# Patient Record
Sex: FEMALE | Race: WHITE | Employment: FULL TIME | URBAN - NONMETROPOLITAN AREA
[De-identification: names, ages, dates, MRNs, and addresses within clinical notes are randomized per-mention and may not be internally consistent; named-entity substitution may affect disease eponyms.]

---

## 2019-09-09 LAB — PAP SMEAR, EXTERNAL: NORMAL

## 2021-03-05 ENCOUNTER — OFFICE VISIT (OUTPATIENT)
Dept: FAMILY MEDICINE CLINIC | Age: 41
End: 2021-03-05
Payer: COMMERCIAL

## 2021-03-05 VITALS
HEART RATE: 80 BPM | BODY MASS INDEX: 34.26 KG/M2 | DIASTOLIC BLOOD PRESSURE: 62 MMHG | HEIGHT: 65 IN | WEIGHT: 205.6 LBS | SYSTOLIC BLOOD PRESSURE: 120 MMHG | OXYGEN SATURATION: 95 % | TEMPERATURE: 98.2 F

## 2021-03-05 DIAGNOSIS — M19.90 ARTHRITIS: ICD-10-CM

## 2021-03-05 DIAGNOSIS — E66.9 OBESITY, UNSPECIFIED CLASSIFICATION, UNSPECIFIED OBESITY TYPE, UNSPECIFIED WHETHER SERIOUS COMORBIDITY PRESENT: ICD-10-CM

## 2021-03-05 DIAGNOSIS — R51.9 CHRONIC NONINTRACTABLE HEADACHE, UNSPECIFIED HEADACHE TYPE: ICD-10-CM

## 2021-03-05 DIAGNOSIS — G89.29 CHRONIC NONINTRACTABLE HEADACHE, UNSPECIFIED HEADACHE TYPE: ICD-10-CM

## 2021-03-05 DIAGNOSIS — Z00.00 ROUTINE GENERAL MEDICAL EXAMINATION AT A HEALTH CARE FACILITY: ICD-10-CM

## 2021-03-05 DIAGNOSIS — F41.9 ANXIETY: Primary | ICD-10-CM

## 2021-03-05 PROBLEM — K64.9 UNSPECIFIED HEMORRHOIDS: Status: ACTIVE | Noted: 2020-10-19

## 2021-03-05 PROBLEM — R87.811 VAGINAL HIGH RISK HUMAN PAPILLOMAVIRUS (HPV) DNA TEST POSITIVE: Status: ACTIVE | Noted: 2020-10-19

## 2021-03-05 PROBLEM — Z72.0 TOBACCO USE: Status: ACTIVE | Noted: 2020-10-19

## 2021-03-05 PROBLEM — J30.2 OTHER SEASONAL ALLERGIC RHINITIS: Status: ACTIVE | Noted: 2020-10-19

## 2021-03-05 PROCEDURE — 99204 OFFICE O/P NEW MOD 45 MIN: CPT | Performed by: PHYSICIAN ASSISTANT

## 2021-03-05 RX ORDER — IBUPROFEN 800 MG/1
1 TABLET ORAL EVERY 6 HOURS PRN
COMMUNITY
Start: 2021-02-23 | End: 2021-03-05 | Stop reason: SDUPTHER

## 2021-03-05 RX ORDER — CITALOPRAM 40 MG/1
1 TABLET ORAL DAILY
COMMUNITY
Start: 2021-02-22 | End: 2021-03-05

## 2021-03-05 RX ORDER — BUSPIRONE HYDROCHLORIDE 10 MG/1
10 TABLET ORAL 3 TIMES DAILY
COMMUNITY
End: 2021-03-05

## 2021-03-05 RX ORDER — BUTALBITAL, ACETAMINOPHEN AND CAFFEINE 50; 325; 40 MG/1; MG/1; MG/1
1-2 TABLET ORAL EVERY 4 HOURS PRN
COMMUNITY
Start: 2021-02-09 | End: 2021-03-05 | Stop reason: SDUPTHER

## 2021-03-05 RX ORDER — IBUPROFEN 800 MG/1
800 TABLET ORAL EVERY 6 HOURS PRN
Qty: 120 TABLET | Refills: 2 | Status: SHIPPED | OUTPATIENT
Start: 2021-03-05 | End: 2021-08-23

## 2021-03-05 RX ORDER — AMITRIPTYLINE HYDROCHLORIDE 25 MG/1
25 TABLET, FILM COATED ORAL NIGHTLY
Qty: 90 TABLET | Refills: 1 | Status: SHIPPED | OUTPATIENT
Start: 2021-03-05 | End: 2021-06-22

## 2021-03-05 RX ORDER — BUTALBITAL, ACETAMINOPHEN AND CAFFEINE 50; 325; 40 MG/1; MG/1; MG/1
1-2 TABLET ORAL EVERY 4 HOURS PRN
Qty: 20 TABLET | Refills: 2 | Status: SHIPPED | OUTPATIENT
Start: 2021-03-05 | End: 2021-06-18 | Stop reason: SDUPTHER

## 2021-03-05 SDOH — HEALTH STABILITY: MENTAL HEALTH: HOW MANY STANDARD DRINKS CONTAINING ALCOHOL DO YOU HAVE ON A TYPICAL DAY?: NOT ASKED

## 2021-03-05 SDOH — HEALTH STABILITY: MENTAL HEALTH: HOW OFTEN DO YOU HAVE A DRINK CONTAINING ALCOHOL?: MONTHLY OR LESS

## 2021-03-05 ASSESSMENT — ENCOUNTER SYMPTOMS
NAUSEA: 0
VOMITING: 0
EYE REDNESS: 0
COLOR CHANGE: 0
CHEST TIGHTNESS: 0
EYE PAIN: 0
SHORTNESS OF BREATH: 0
SORE THROAT: 0
PHOTOPHOBIA: 0
WHEEZING: 0
DIARRHEA: 0
EYE DISCHARGE: 0
BLOOD IN STOOL: 0
CONSTIPATION: 0
ABDOMINAL PAIN: 0
BACK PAIN: 0
COUGH: 0
RHINORRHEA: 0

## 2021-03-05 ASSESSMENT — PATIENT HEALTH QUESTIONNAIRE - PHQ9
SUM OF ALL RESPONSES TO PHQ9 QUESTIONS 1 & 2: 0
SUM OF ALL RESPONSES TO PHQ QUESTIONS 1-9: 0
1. LITTLE INTEREST OR PLEASURE IN DOING THINGS: 0
SUM OF ALL RESPONSES TO PHQ QUESTIONS 1-9: 0

## 2021-03-05 NOTE — PROGRESS NOTES
Millicent Calix (:  1980) is a 41 y.o. female,New patient, here for evaluation of the following chief complaint(s):    New Patient (Establish primary care)  This is my first patient encounter with Millicent Calix; previous PCP was [facility] in [place]; chart review was completed. SUBJECTIVE/OBJECTIVE:  HPI     Millicent Calix is a pleasant 41 y.o. female presenting to clinic today to establish care and for medical management. Anxiety - patient reports chronic generalized anxiety; states that BuSpar and Celexa which were previously prescribed to her have not worked, and patient took herself off of these and has not taken them for the past month. Patient reports both those medications contributed to her chronic headaches. Patient denies any recent panic attacks, suicidal or homicidal ideations. Chronic headaches -patient reports she has taken Fioricet since she was 13years old, states that she recently was taking it almost daily due to concurrent BuSpar and Celexa. Patient reports that since discontinuing those medications she only has to take Fioricet approximately once per week. Left hip and left knee pain -patient reports over the past several years she has had increasing achy pain in her left hip and knee which is worsened with activity, patient reports that she walks approximately 20,000 steps per day at her job. Patient denies any acute traumatic injuries or events. patient is concerned for osteoarthritis. Patient denies any recent episodes of chest pain shortness of breath, change in bowel or bladder habits, vision changes, lightheadedness, syncope, edema, paresthesias.        Current Outpatient Medications   Medication Sig Dispense Refill    amitriptyline (ELAVIL) 25 MG tablet Take 1 tablet by mouth nightly 90 tablet 1    ibuprofen (ADVIL;MOTRIN) 800 MG tablet Take 1 tablet by mouth every 6 hours as needed for Pain (headaches) 120 tablet 2    butalbital-acetaminophen-caffeine (FIORICET, ESGIC) -40 MG per tablet Take 1-2 tablets by mouth every 4 hours as needed for Headaches 20 tablet 2     No current facility-administered medications for this visit. Review of Systems   Constitutional: Negative for appetite change, chills, fatigue and fever. HENT: Negative for congestion, ear pain, hearing loss, rhinorrhea and sore throat. Eyes: Negative for photophobia, pain, discharge and redness. Respiratory: Negative for cough, chest tightness, shortness of breath and wheezing. Cardiovascular: Negative for chest pain, palpitations and leg swelling. Gastrointestinal: Negative for abdominal pain, blood in stool, constipation, diarrhea, nausea and vomiting. Endocrine: Negative for polyuria. Genitourinary: Negative for difficulty urinating, dysuria, flank pain, frequency, hematuria and urgency. Musculoskeletal: Positive for arthralgias. Negative for back pain, gait problem and joint swelling. Skin: Negative for color change and rash. Neurological: Positive for headaches. Negative for dizziness, syncope, weakness and light-headedness. Hematological: Negative for adenopathy. Psychiatric/Behavioral: Negative for agitation, behavioral problems and suicidal ideas. The patient is nervous/anxious. Physical Exam  Vitals signs and nursing note reviewed. Constitutional:       General: She is not in acute distress. Appearance: She is obese. She is not ill-appearing. HENT:      Head: Normocephalic and atraumatic. Right Ear: External ear normal.      Left Ear: Tympanic membrane and external ear normal.      Ears:      Comments: Prior surgical intervention performed on right tympanic membrane and ear canal     Nose: No congestion or rhinorrhea. Mouth/Throat:      Mouth: Mucous membranes are moist.      Pharynx: No oropharyngeal exudate or posterior oropharyngeal erythema.    Eyes:      Extraocular Movements: Extraocular movements intact. Pupils: Pupils are equal, round, and reactive to light. Neck:      Musculoskeletal: Normal range of motion. No neck rigidity. Vascular: No carotid bruit. Cardiovascular:      Rate and Rhythm: Normal rate. Pulses: Normal pulses. Pulmonary:      Effort: Pulmonary effort is normal.   Abdominal:      Palpations: Abdomen is soft. Musculoskeletal: Normal range of motion. Skin:     General: Skin is warm and dry. Capillary Refill: Capillary refill takes less than 2 seconds. Neurological:      Mental Status: She is alert and oriented to person, place, and time. Mental status is at baseline. Psychiatric:         Mood and Affect: Mood normal.         ASSESSMENT/PLAN:  1. Anxiety   -Patient took herself off of Celexa and BuSpar 1 month ago due to side effects; no need to wean off at this point. Headaches have improved since discontinuing  medication, however patient reports that anxiety levels are high and she would like a new medication. Patient is not interested in psychiatric evaluation or cognitive behavioral therapy at this time. -     amitriptyline (ELAVIL) 25 MG tablet; Take 1 tablet by mouth nightly, Disp-90 tablet, R-1Normal   -Counseled patient on uptitrating slowly weekly and potential side effects  2. Chronic nonintractable headache, unspecified headache type   -Reports longstanding use of Fioricet, currently using approximately once per week with benefit. -     butalbital-acetaminophen-caffeine (FIORICET, ESGIC) -40 MG per tablet; Take 1-2 tablets by mouth every 4 hours as needed for Headaches, Disp-20 tablet, R-2Normal  3. Arthritis   -Counseled patient that he is in a small amount of weight loss may significantly reduce her arthritic pain. -     ibuprofen (ADVIL;MOTRIN) 800 MG tablet; Take 1 tablet by mouth every 6 hours as needed for Pain (headaches), Disp-120 tablet, R-2Normal  -     XR HIP LEFT (2-3 VIEWS)  -     XR KNEE LEFT (3 VIEWS)  4. Obesity, unspecified classification, unspecified obesity type, unspecified whether serious comorbidity present  The patient is asked to make an attempt to improve diet and exercise patterns to aid in medical management of this problem. -     Lipid, Fasting  -     CBC WITH AUTO DIFFERENTIAL  -     COMPREHENSIVE METABOLIC PANEL  -     TSH WITH REFLEX TO FT4  -     VITAMIN D 25 HYDROXY  5. Routine general medical examination at a health care facility  -     Lipid, Fasting  -     CBC WITH AUTO DIFFERENTIAL  -     COMPREHENSIVE METABOLIC PANEL  -     TSH WITH REFLEX TO FT4  -     VITAMIN D 25 HYDROXY      Return in about 6 weeks (around 4/16/2021), or if symptoms worsen or fail to improve, for Follow Up. An electronic signature was used to authenticate this note.     --Rodgers Meigs, PA

## 2021-05-07 DIAGNOSIS — Z00.00 ROUTINE GENERAL MEDICAL EXAMINATION AT A HEALTH CARE FACILITY: ICD-10-CM

## 2021-05-07 DIAGNOSIS — Z00.00 ROUTINE GENERAL MEDICAL EXAMINATION AT A HEALTH CARE FACILITY: Primary | ICD-10-CM

## 2021-05-07 LAB
A/G RATIO: 2 (ref 1.1–2.2)
ALBUMIN SERPL-MCNC: 4.1 G/DL (ref 3.4–5)
ALP BLD-CCNC: 75 U/L (ref 40–129)
ALT SERPL-CCNC: 12 U/L (ref 10–40)
ANION GAP SERPL CALCULATED.3IONS-SCNC: 11 MMOL/L (ref 3–16)
AST SERPL-CCNC: 10 U/L (ref 15–37)
BASOPHILS ABSOLUTE: 0.1 K/UL (ref 0–0.2)
BASOPHILS RELATIVE PERCENT: 0.6 %
BILIRUB SERPL-MCNC: <0.2 MG/DL (ref 0–1)
BUN BLDV-MCNC: 16 MG/DL (ref 7–20)
CALCIUM SERPL-MCNC: 8.7 MG/DL (ref 8.3–10.6)
CHLORIDE BLD-SCNC: 105 MMOL/L (ref 99–110)
CHOLESTEROL, FASTING: 146 MG/DL (ref 0–199)
CO2: 23 MMOL/L (ref 21–32)
CREAT SERPL-MCNC: 0.5 MG/DL (ref 0.6–1.1)
EOSINOPHILS ABSOLUTE: 0.5 K/UL (ref 0–0.6)
EOSINOPHILS RELATIVE PERCENT: 4.5 %
GFR AFRICAN AMERICAN: >60
GFR NON-AFRICAN AMERICAN: >60
GLOBULIN: 2.1 G/DL
GLUCOSE BLD-MCNC: 95 MG/DL (ref 70–99)
HCT VFR BLD CALC: 40.8 % (ref 36–48)
HDLC SERPL-MCNC: 31 MG/DL (ref 40–60)
HEMOGLOBIN: 14.1 G/DL (ref 12–16)
LDL CHOLESTEROL CALCULATED: 87 MG/DL
LYMPHOCYTES ABSOLUTE: 3 K/UL (ref 1–5.1)
LYMPHOCYTES RELATIVE PERCENT: 24.2 %
MCH RBC QN AUTO: 30.7 PG (ref 26–34)
MCHC RBC AUTO-ENTMCNC: 34.5 G/DL (ref 31–36)
MCV RBC AUTO: 89.1 FL (ref 80–100)
MONOCYTES ABSOLUTE: 0.5 K/UL (ref 0–1.3)
MONOCYTES RELATIVE PERCENT: 4.4 %
NEUTROPHILS ABSOLUTE: 8.1 K/UL (ref 1.7–7.7)
NEUTROPHILS RELATIVE PERCENT: 66.3 %
PDW BLD-RTO: 13.7 % (ref 12.4–15.4)
PLATELET # BLD: 296 K/UL (ref 135–450)
PMV BLD AUTO: 9.7 FL (ref 5–10.5)
POTASSIUM SERPL-SCNC: 4.5 MMOL/L (ref 3.5–5.1)
RBC # BLD: 4.58 M/UL (ref 4–5.2)
SODIUM BLD-SCNC: 139 MMOL/L (ref 136–145)
TOTAL PROTEIN: 6.2 G/DL (ref 6.4–8.2)
TRIGLYCERIDE, FASTING: 141 MG/DL (ref 0–150)
TSH REFLEX: 1.55 UIU/ML (ref 0.27–4.2)
VITAMIN D 25-HYDROXY: 16.9 NG/ML
VLDLC SERPL CALC-MCNC: 28 MG/DL
WBC # BLD: 12.3 K/UL (ref 4–11)

## 2021-06-17 DIAGNOSIS — R51.9 CHRONIC NONINTRACTABLE HEADACHE, UNSPECIFIED HEADACHE TYPE: ICD-10-CM

## 2021-06-17 DIAGNOSIS — G89.29 CHRONIC NONINTRACTABLE HEADACHE, UNSPECIFIED HEADACHE TYPE: ICD-10-CM

## 2021-06-18 RX ORDER — BUTALBITAL, ACETAMINOPHEN AND CAFFEINE 50; 325; 40 MG/1; MG/1; MG/1
1-2 TABLET ORAL EVERY 4 HOURS PRN
Qty: 20 TABLET | Refills: 2 | Status: SHIPPED | OUTPATIENT
Start: 2021-06-18 | End: 2021-08-13 | Stop reason: SDUPTHER

## 2021-06-22 ENCOUNTER — OFFICE VISIT (OUTPATIENT)
Dept: FAMILY MEDICINE CLINIC | Age: 41
End: 2021-06-22
Payer: COMMERCIAL

## 2021-06-22 VITALS
HEART RATE: 70 BPM | BODY MASS INDEX: 33.71 KG/M2 | RESPIRATION RATE: 16 BRPM | OXYGEN SATURATION: 96 % | TEMPERATURE: 97.6 F | WEIGHT: 202.6 LBS | SYSTOLIC BLOOD PRESSURE: 130 MMHG | DIASTOLIC BLOOD PRESSURE: 80 MMHG

## 2021-06-22 DIAGNOSIS — G89.29 CHRONIC NONINTRACTABLE HEADACHE, UNSPECIFIED HEADACHE TYPE: Primary | ICD-10-CM

## 2021-06-22 DIAGNOSIS — F41.9 ANXIETY: ICD-10-CM

## 2021-06-22 DIAGNOSIS — R51.9 CHRONIC NONINTRACTABLE HEADACHE, UNSPECIFIED HEADACHE TYPE: Primary | ICD-10-CM

## 2021-06-22 PROCEDURE — 99213 OFFICE O/P EST LOW 20 MIN: CPT | Performed by: PHYSICIAN ASSISTANT

## 2021-06-22 PROCEDURE — G8427 DOCREV CUR MEDS BY ELIG CLIN: HCPCS | Performed by: PHYSICIAN ASSISTANT

## 2021-06-22 PROCEDURE — G8417 CALC BMI ABV UP PARAM F/U: HCPCS | Performed by: PHYSICIAN ASSISTANT

## 2021-06-22 PROCEDURE — 4004F PT TOBACCO SCREEN RCVD TLK: CPT | Performed by: PHYSICIAN ASSISTANT

## 2021-06-22 RX ORDER — VENLAFAXINE HYDROCHLORIDE 37.5 MG/1
37.5 CAPSULE, EXTENDED RELEASE ORAL DAILY
Qty: 60 CAPSULE | Refills: 0 | Status: SHIPPED | OUTPATIENT
Start: 2021-06-22 | End: 2021-07-07

## 2021-06-22 RX ORDER — RIZATRIPTAN BENZOATE 5 MG/1
5 TABLET ORAL
Qty: 9 TABLET | Refills: 3 | Status: SHIPPED | OUTPATIENT
Start: 2021-06-22 | End: 2021-07-13 | Stop reason: SDUPTHER

## 2021-06-22 ASSESSMENT — ENCOUNTER SYMPTOMS
DIARRHEA: 0
EYE REDNESS: 0
SHORTNESS OF BREATH: 0
SORE THROAT: 0
PHOTOPHOBIA: 0
CONSTIPATION: 0
CHEST TIGHTNESS: 0
COUGH: 0
VOMITING: 0
RHINORRHEA: 0
ABDOMINAL PAIN: 0
EYE PAIN: 0
BACK PAIN: 0
WHEEZING: 0
COLOR CHANGE: 0
NAUSEA: 0
EYE DISCHARGE: 0
BLOOD IN STOOL: 0

## 2021-06-22 ASSESSMENT — PATIENT HEALTH QUESTIONNAIRE - PHQ9
SUM OF ALL RESPONSES TO PHQ QUESTIONS 1-9: 0
SUM OF ALL RESPONSES TO PHQ9 QUESTIONS 1 & 2: 0
2. FEELING DOWN, DEPRESSED OR HOPELESS: 0
SUM OF ALL RESPONSES TO PHQ QUESTIONS 1-9: 0
SUM OF ALL RESPONSES TO PHQ QUESTIONS 1-9: 0
1. LITTLE INTEREST OR PLEASURE IN DOING THINGS: 0

## 2021-06-22 NOTE — PROGRESS NOTES
Roxana Chapa (:  1980) is a 35 y.o. female,Established patient, here for evaluation of the following chief complaint(s):    Headache (front and back of head needs work note constant pain)      SUBJECTIVE/OBJECTIVE:  HPI  Roxana Chapa is a pleasant 35 y.o. female presenting to clinic today for complaint of headache. Headache -patient does have significant history of chronic headaches; patient last seen approximately 3 months ago and patient attributed to her headaches to Celexa and BuSpar medications for which she discontinued and had relief of her headaches. Patient reports that approximately 1 month ago she began to take these medications again because she states that she \"did not feel like myself. \" Patient reports that she began to experience somewhat increase in headaches after initiating these medications but did feel better from a anxiety standpoint. Patient reports that she abruptly discontinued the Celexa a few days ago and had a significant headache onset yesterday. Patient reports that she took some ibuprofen without relief of headache. Patient reports ongoing headache located in occipital and frontal region; patient describes pain as sharp stabbing etc. Patient denies any neurologic symptoms. Patient states she felt nauseous yesterday but denies any vomiting. Patient reports she previously had taken Fioricet which typically had worked for headaches in the past but did not work for this headache today. Patient does report that she drinks approximately 4 Monster energy drinks per day and states that she has tried to cut back in the past few days as well. Anxiety -as noted above patient abruptly discontinued her Celexa which she self reinitiated approximately 1 month ago. Patient does report a constant worry/anxiety/stress which she is requesting medication for. Patient denies any suicidal homicidal ideations.  Patient is requesting medication other than Celexa, BuSpar, amitriptyline which she reports did not help with her anxiety. Patient reports that she was previously on a different medication several years ago for anxiety but is unsure what that medication was. Patient states she has never been on Effexor. Current Outpatient Medications   Medication Sig Dispense Refill    rizatriptan (MAXALT) 5 MG tablet Take 1 tablet by mouth once as needed for Migraine May repeat in 2 hours if needed 9 tablet 3    venlafaxine (EFFEXOR XR) 37.5 MG extended release capsule Take 1 capsule by mouth daily 60 capsule 0    butalbital-acetaminophen-caffeine (FIORICET, ESGIC) -40 MG per tablet Take 1-2 tablets by mouth every 4 hours as needed for Headaches 20 tablet 2    ibuprofen (ADVIL;MOTRIN) 800 MG tablet Take 1 tablet by mouth every 6 hours as needed for Pain (headaches) 120 tablet 2     No current facility-administered medications for this visit. Review of Systems   Constitutional: Negative for appetite change, chills, fatigue and fever. HENT: Negative for congestion, ear pain, hearing loss, rhinorrhea and sore throat. Eyes: Negative for photophobia, pain, discharge and redness. Respiratory: Negative for cough, chest tightness, shortness of breath and wheezing. Cardiovascular: Negative for chest pain, palpitations and leg swelling. Gastrointestinal: Negative for abdominal pain, blood in stool, constipation, diarrhea, nausea and vomiting. Endocrine: Negative for polyuria. Genitourinary: Negative for difficulty urinating, dysuria, flank pain, frequency, hematuria and urgency. Musculoskeletal: Negative for back pain, gait problem and joint swelling. Skin: Negative for color change and rash. Neurological: Positive for headaches. Negative for dizziness, syncope, weakness and light-headedness. Hematological: Negative for adenopathy. Psychiatric/Behavioral: Negative for agitation, behavioral problems and suicidal ideas. The patient is nervous/anxious.         Physical Exam  Vitals and nursing note reviewed. Constitutional:       General: She is not in acute distress. Appearance: She is obese. She is not ill-appearing. HENT:      Head: Normocephalic and atraumatic. Right Ear: External ear normal.      Left Ear: Tympanic membrane and external ear normal.      Ears:      Comments: Prior surgical intervention performed on right tympanic membrane and ear canal     Nose: No congestion or rhinorrhea. Mouth/Throat:      Mouth: Mucous membranes are moist.      Pharynx: No oropharyngeal exudate or posterior oropharyngeal erythema. Eyes:      Extraocular Movements: Extraocular movements intact. Pupils: Pupils are equal, round, and reactive to light. Neck:      Vascular: No carotid bruit. Cardiovascular:      Rate and Rhythm: Normal rate. Pulses: Normal pulses. Pulmonary:      Effort: Pulmonary effort is normal.   Abdominal:      Palpations: Abdomen is soft. Musculoskeletal:         General: Normal range of motion. Cervical back: Normal range of motion. No rigidity. Skin:     General: Skin is warm and dry. Capillary Refill: Capillary refill takes less than 2 seconds. Neurological:      General: No focal deficit present. Mental Status: She is alert and oriented to person, place, and time. Mental status is at baseline. Cranial Nerves: No cranial nerve deficit. Sensory: No sensory deficit. Motor: No weakness. Coordination: Coordination normal.      Gait: Gait normal.      Deep Tendon Reflexes: Reflexes normal.      Comments: Cranial nerve exam normal, Romberg testing normal. Negative [place]. Psychiatric:         Mood and Affect: Mood normal.         ASSESSMENT/PLAN:  1.  Chronic nonintractable headache, unspecified headache type   -Patient's current headache is likely multifactorial and related to abrupt discontinuation of citalopram which was previously discontinued by provider per patient request. Zahra Arteaga patient in depth on never discontinuing these medications abruptly due to withdrawal/adverse events. Furthermore, patient's headache may be related to caffeine withdrawal as she does report significant caffeine consumption daily. Patient reports she previously was given a triptan but did not take it because it was an injectable. Patient reports Fioricet did not work earlier for this current headache. Will send in Maxalt for patient's current headache; advised patient that this medication may not provide relief based on pathophysiology of patient's current headache.   -No concerning red flag features of patient's current headache at this time. Advised patient to report to emergency department if headache changes or worsen significantly or if patient begins to experience any neurologic symptoms. -     rizatriptan (MAXALT) 5 MG tablet; Take 1 tablet by mouth once as needed for Migraine May repeat in 2 hours if needed, Disp-9 tablet, R-3Normal  2. Anxiety   -Patient's chronic worry/anxiety is affecting her ADLs etc. Will trial Effexor for 1 month and follow-up for dose adjustments etc. Advised patient on side effects, monitoring, dosage.  -     venlafaxine (EFFEXOR XR) 37.5 MG extended release capsule; Take 1 capsule by mouth daily, Disp-60 capsule, R-0Normal      Return in about 4 weeks (around 7/20/2021), or if symptoms worsen or fail to improve, for Follow Up. An electronic signature was used to authenticate this note.     --[place] Lindsay

## 2021-07-09 ENCOUNTER — APPOINTMENT (OUTPATIENT)
Dept: GENERAL RADIOLOGY | Age: 41
End: 2021-07-09
Payer: COMMERCIAL

## 2021-07-09 ENCOUNTER — HOSPITAL ENCOUNTER (EMERGENCY)
Age: 41
Discharge: HOME | End: 2021-07-09
Attending: EMERGENCY MEDICINE
Payer: COMMERCIAL

## 2021-07-09 VITALS
WEIGHT: 190 LBS | DIASTOLIC BLOOD PRESSURE: 58 MMHG | TEMPERATURE: 97.2 F | SYSTOLIC BLOOD PRESSURE: 115 MMHG | RESPIRATION RATE: 18 BRPM | BODY MASS INDEX: 31.65 KG/M2 | HEIGHT: 65 IN | OXYGEN SATURATION: 98 % | HEART RATE: 73 BPM

## 2021-07-09 DIAGNOSIS — S39.012A STRAIN OF LUMBAR REGION, INITIAL ENCOUNTER: Primary | ICD-10-CM

## 2021-07-09 PROCEDURE — 99283 EMERGENCY DEPT VISIT LOW MDM: CPT

## 2021-07-09 PROCEDURE — 72100 X-RAY EXAM L-S SPINE 2/3 VWS: CPT

## 2021-07-09 PROCEDURE — 96372 THER/PROPH/DIAG INJ SC/IM: CPT

## 2021-07-09 PROCEDURE — 6370000000 HC RX 637 (ALT 250 FOR IP): Performed by: EMERGENCY MEDICINE

## 2021-07-09 PROCEDURE — 6360000002 HC RX W HCPCS: Performed by: EMERGENCY MEDICINE

## 2021-07-09 RX ORDER — KETOROLAC TROMETHAMINE 30 MG/ML
30 INJECTION, SOLUTION INTRAMUSCULAR; INTRAVENOUS ONCE
Status: COMPLETED | OUTPATIENT
Start: 2021-07-09 | End: 2021-07-09

## 2021-07-09 RX ORDER — LIDOCAINE 4 G/G
1 PATCH TOPICAL DAILY
Status: DISCONTINUED | OUTPATIENT
Start: 2021-07-09 | End: 2021-07-09 | Stop reason: HOSPADM

## 2021-07-09 RX ORDER — METHYLPREDNISOLONE 4 MG/1
TABLET ORAL
Qty: 1 KIT | Refills: 0 | Status: SHIPPED | OUTPATIENT
Start: 2021-07-09 | End: 2021-10-20 | Stop reason: DRUGHIGH

## 2021-07-09 RX ORDER — DIAZEPAM 5 MG/1
5 TABLET ORAL ONCE
Status: COMPLETED | OUTPATIENT
Start: 2021-07-09 | End: 2021-07-09

## 2021-07-09 RX ORDER — HYDROCODONE BITARTRATE AND ACETAMINOPHEN 5; 325 MG/1; MG/1
1 TABLET ORAL ONCE
Status: COMPLETED | OUTPATIENT
Start: 2021-07-09 | End: 2021-07-09

## 2021-07-09 RX ORDER — CYCLOBENZAPRINE HCL 10 MG
10 TABLET ORAL 3 TIMES DAILY PRN
Qty: 30 TABLET | Refills: 0 | Status: SHIPPED | OUTPATIENT
Start: 2021-07-09 | End: 2021-07-19

## 2021-07-09 RX ORDER — CITALOPRAM 40 MG/1
40 TABLET ORAL DAILY
COMMUNITY
Start: 2021-05-27 | End: 2021-07-09

## 2021-07-09 RX ORDER — LIDOCAINE 50 MG/G
1 PATCH TOPICAL DAILY
Qty: 30 PATCH | Refills: 0 | Status: SHIPPED | OUTPATIENT
Start: 2021-07-09 | End: 2021-08-08

## 2021-07-09 RX ORDER — ORPHENADRINE CITRATE 30 MG/ML
60 INJECTION INTRAMUSCULAR; INTRAVENOUS ONCE
Status: COMPLETED | OUTPATIENT
Start: 2021-07-09 | End: 2021-07-09

## 2021-07-09 RX ADMIN — HYDROCODONE BITARTRATE AND ACETAMINOPHEN 1 TABLET: 5; 325 TABLET ORAL at 16:39

## 2021-07-09 RX ADMIN — DIAZEPAM 5 MG: 5 TABLET ORAL at 17:25

## 2021-07-09 RX ADMIN — KETOROLAC TROMETHAMINE 30 MG: 30 INJECTION, SOLUTION INTRAMUSCULAR; INTRAVENOUS at 16:39

## 2021-07-09 RX ADMIN — ORPHENADRINE CITRATE 60 MG: 30 INJECTION INTRAMUSCULAR; INTRAVENOUS at 16:39

## 2021-07-09 ASSESSMENT — PAIN DESCRIPTION - DESCRIPTORS: DESCRIPTORS: SHARP

## 2021-07-09 ASSESSMENT — PAIN DESCRIPTION - FREQUENCY: FREQUENCY: CONTINUOUS

## 2021-07-09 ASSESSMENT — PAIN DESCRIPTION - ORIENTATION: ORIENTATION: RIGHT;LOWER

## 2021-07-09 ASSESSMENT — PAIN DESCRIPTION - LOCATION: LOCATION: BACK

## 2021-07-09 ASSESSMENT — PAIN DESCRIPTION - PAIN TYPE: TYPE: ACUTE PAIN

## 2021-07-09 ASSESSMENT — PAIN SCALES - GENERAL: PAINLEVEL_OUTOF10: 10

## 2021-07-09 NOTE — ED PROVIDER NOTES
Emergency [address] [facility] EMERGENCY DEPARTMENT    Patient: Nubia Alcala  MRN: [id number]  : 1980  Date of Evaluation: 2021  ED Provider: Duncan Richardson DO    Chief Complaint       Chief Complaint   Patient presents with    Back Pain     Bending down at work to  a pan and when she tried to stand back up had a sharp pain in lower right side of back, no hx of back issues     Key Tan is a 35 y.o. female who presents to the emergency department with complaints of injury to her right low back. The patient states she was at work and she bent over to  a pan that was on the floor. When she straightened back up she felt lancinating pain shoot up her back from her right sacroiliac region. The patient states this occurred approximately 1:10 PM at work. She presents to the emergency department complaining of 10 out of 10 pain \"I cannot walk. \"  And states \"it hurts when I lay on it\". Patient has had no previous back injuries. She suffered no trauma during this event. She has not taken any medication after this occurred. She came right to the emergency department from work. Numbness weakness tingling is absent. Loss of bowel or bladder control is absent. Saddle anesthesia absent. Fevers absent. IV Drug Use absent. ROS:     At least 6 systems reviewed and otherwise acutely negative except as in the Beto Glatter. Past History   History reviewed. No pertinent past medical history. History reviewed. No pertinent surgical history. Social History     Socioeconomic History    Marital status:      Spouse name: None    Number of children: None    Years of education: None    Highest education level: None   Occupational History    None   Tobacco Use    Smoking status: Current Every Day Smoker     Packs/day: 0.50     Years: 24.00     Pack years: 12.00    Smokeless tobacco: Never Used   Substance and Sexual Activity    Alcohol use:  Yes    Drug use: Never    Sexual activity: None   Other Topics Concern    None   Social History Narrative    None     Social Determinants of Health     Financial Resource Strain:     Difficulty of Paying Living Expenses:    Food Insecurity:     Worried About Running Out of Food in the Last Year:     [address] in the Last Year:    Transportation Needs:     Lack of Transportation (Medical):  Lack of Transportation (Non-Medical):    Physical Activity:     Days of Exercise per Week:     Minutes of Exercise per Session:    Stress:     Feeling of Stress :    Social Connections:     Frequency of Communication with Friends and Family:     Frequency of Social Gatherings with Friends and Family:     Attends [employer]ab[employer] Services:     Active Member of Clubs or Organizations:     Attends Club or Organization Meetings:     Marital Status:    Intimate Partner Violence:     Fear of Current or Ex-Partner:     Emotionally Abused:     Physically Abused:     Sexually Abused:        Medications/Allergies     Previous Medications    BUTALBITAL-ACETAMINOPHEN-CAFFEINE (FIORICET, ESGIC) -40 MG PER TABLET    Take 1-2 tablets by mouth every 4 hours as needed for Headaches    IBUPROFEN (ADVIL;MOTRIN) 800 MG TABLET    Take 1 tablet by mouth every 6 hours as needed for Pain (headaches)    RIZATRIPTAN (MAXALT) 5 MG TABLET    Take 1 tablet by mouth once as needed for Migraine May repeat in 2 hours if needed    VENLAFAXINE (EFFEXOR XR) 37.5 MG EXTENDED RELEASE CAPSULE    TAKE 1 CAPSULE BY MOUTH EVERY DAY     No Known Allergies     Physical Exam       ED Triage Vitals [07/09/21 1401]   BP Temp Temp Source Pulse Resp SpO2 Height Weight   (!) 115/58 97.2 °F (36.2 °C) Oral 73 18 98 % 5' 5\" (1.651 m) 190 lb (86.2 kg)     GENERAL APPEARANCE: Awake and alert. Cooperative. Nontoxic in appearance. The patient is laying on the bed with her legs extended. She is laying supine on her back. HEAD: Normocephalic.   EYES: Sclera anicteric. ENT: Tolerates saliva. NECK: Supple. LUNGS: Respirations unlabored. BACK: There is not thoracic or lumbar midline tenderness to palpation or step-offs. Paraspinal tenderness to palpation is  present in the right sacroiliac joint region. No overlying rashes. LE strength is 5/5. LE light touch is intact. LE DTR's are 2+ in the patellas and achilles. Straight leg test is positive on the RIGHT, negative on the LEFT. SKIN: Warm and dry. Diagnostics   Labs:  No results found for this visit on 07/09/21. Radiographs:  XR LUMBAR SPINE (2-3 VIEWS)    Result Date: 7/9/2021  EXAMINATION: THREE X-RAY VIEWS OF THE LUMBAR SPINE 7/9/2021 5:03 pm COMPARISON: None. HISTORY: ORDERING SYSTEM PROVIDED HISTORY: Low back pain, injured at work TECHNOLOGIST PROVIDED HISTORY: Reason for exam:->Low back pain, injured at work Reason for Exam: Low back pain, injured at work Acuity: Acute Type of Exam: Initial Mechanism of Injury: Low back pain, injured at work FINDINGS: The 1st non rib-bearing vertebral body is considered L1. Alignment is anatomic without spondylolisthesis. Vertebral body heights and disc spaces are maintained. No acute fracture or dislocation. Soft tissues are unremarkable. The sacroiliac joints are maintained. Tubal ligation clips are noted in the pelvis. No acute osseous abnormality or spondylolisthesis. ED Course and MDM   In brief, Alicia Nicole is a 35 y.o. female who presented to the emergency department with complaints of low back pain after lifting a device at work. The patient is complaining of pain in her right sacroiliac region radiating upward into her right flank. Lumbar x-ray here in the emergency department shows no acute findings. She did receive Toradol Norflex and 1 Norco with a Lidoderm patch. She states her pain is not improved although she is ambulatory through the ER. At this time I think it is safe for her to be discharged home.   She will be instructed to follow-up with occupational health before she returns to work. She is instructed to follow-up with them on Monday. She will be discharged stable condition at this time. ED Medication Orders (From admission, onward)    Start Ordered     Status Ordering Provider    07/09/21 1715 07/09/21 1712  diazePAM (VALIUM) tablet 5 mg  ONCE      Last MAR action: Given - by Kassy Beckham on 07/09/21 at [address], [facility]    07/09/21 1630 07/09/21 1616  ketorolac (TORADOL) injection 30 mg  ONCE      Last MAR action: Given - by Kassy Beckham on 07/09/21 at [address], [facility]    07/09/21 1630 07/09/21 1616  orphenadrine (NORFLEX) injection 60 mg  ONCE      Last MAR action: Given - by Kassy Beckham on 07/09/21 at [address], [facility]    07/09/21 1630 07/09/21 1616  HYDROcodone-acetaminophen (NORCO) 5-325 MG per tablet 1 tablet  ONCE      Last MAR action: Given - by Kassy Beckham on 07/09/21 at [address], [facility]    07/09/21 1630 07/09/21 1616  lidocaine 4 % external patch 1 patch  DAILY      Last MAR action: Patch Applied - by Kassy Beckham on 07/09/21 at [address] [place] [address], [facility]          I estimate there is LOW risk for (including but not limited to) RAPIDLY EXPANDING OR RUPTURED AAA, AORTIC DISSECTION, CAUDA EQUINA SYNDROME, or EPIDURAL MASS LESION thus I consider the discharge disposition reasonable. Joliebetsy Julian (or their surrogate) and I have discussed the diagnosis and risks, and we agree with discharging home with close follow-up. We also discussed returning to the Emergency Department immediately if new or worsening symptoms occur. We have discussed the symptoms which are most concerning that necessitate immediate return. Final Impression      1.  Strain of lumbar region, initial encounter        DISPOSITION Decision To Discharge 07/09/2021 05:30:55 PM       (Please note that portions of this note may have been completed with a voice recognition program. Efforts were made to edit the dictations but occasionally words are mis-transcribed.)    Abraham Péerz, DO   Tabby Solis, DO  07/09/21 2839

## 2021-07-13 ENCOUNTER — OFFICE VISIT (OUTPATIENT)
Dept: FAMILY MEDICINE CLINIC | Age: 41
End: 2021-07-13
Payer: COMMERCIAL

## 2021-07-13 VITALS
OXYGEN SATURATION: 98 % | RESPIRATION RATE: 16 BRPM | BODY MASS INDEX: 33.88 KG/M2 | TEMPERATURE: 98.6 F | SYSTOLIC BLOOD PRESSURE: 124 MMHG | HEART RATE: 110 BPM | DIASTOLIC BLOOD PRESSURE: 88 MMHG | WEIGHT: 203.6 LBS

## 2021-07-13 DIAGNOSIS — F41.9 ANXIETY: ICD-10-CM

## 2021-07-13 DIAGNOSIS — G89.29 CHRONIC NONINTRACTABLE HEADACHE, UNSPECIFIED HEADACHE TYPE: ICD-10-CM

## 2021-07-13 DIAGNOSIS — M54.41 ACUTE RIGHT-SIDED LOW BACK PAIN WITH RIGHT-SIDED SCIATICA: Primary | ICD-10-CM

## 2021-07-13 DIAGNOSIS — R51.9 CHRONIC NONINTRACTABLE HEADACHE, UNSPECIFIED HEADACHE TYPE: ICD-10-CM

## 2021-07-13 PROCEDURE — 99214 OFFICE O/P EST MOD 30 MIN: CPT | Performed by: PHYSICIAN ASSISTANT

## 2021-07-13 PROCEDURE — G8417 CALC BMI ABV UP PARAM F/U: HCPCS | Performed by: PHYSICIAN ASSISTANT

## 2021-07-13 PROCEDURE — 4004F PT TOBACCO SCREEN RCVD TLK: CPT | Performed by: PHYSICIAN ASSISTANT

## 2021-07-13 PROCEDURE — G8427 DOCREV CUR MEDS BY ELIG CLIN: HCPCS | Performed by: PHYSICIAN ASSISTANT

## 2021-07-13 RX ORDER — RIZATRIPTAN BENZOATE 5 MG/1
5 TABLET ORAL
Qty: 9 TABLET | Refills: 3 | Status: SHIPPED | OUTPATIENT
Start: 2021-07-13 | End: 2021-08-17 | Stop reason: SDUPTHER

## 2021-07-13 RX ORDER — TIZANIDINE 4 MG/1
4 TABLET ORAL 3 TIMES DAILY
Qty: 30 TABLET | Refills: 1 | Status: SHIPPED | OUTPATIENT
Start: 2021-07-13 | End: 2021-08-17 | Stop reason: SDUPTHER

## 2021-07-13 RX ORDER — VENLAFAXINE HYDROCHLORIDE 75 MG/1
75 CAPSULE, EXTENDED RELEASE ORAL DAILY
Qty: 60 CAPSULE | Refills: 1 | Status: SHIPPED | OUTPATIENT
Start: 2021-07-13 | End: 2021-09-07

## 2021-07-13 ASSESSMENT — PATIENT HEALTH QUESTIONNAIRE - PHQ9
SUM OF ALL RESPONSES TO PHQ9 QUESTIONS 1 & 2: 6
9. THOUGHTS THAT YOU WOULD BE BETTER OFF DEAD, OR OF HURTING YOURSELF: 0
1. LITTLE INTEREST OR PLEASURE IN DOING THINGS: 3
4. FEELING TIRED OR HAVING LITTLE ENERGY: 3
8. MOVING OR SPEAKING SO SLOWLY THAT OTHER PEOPLE COULD HAVE NOTICED. OR THE OPPOSITE, BEING SO FIGETY OR RESTLESS THAT YOU HAVE BEEN MOVING AROUND A LOT MORE THAN USUAL: 0
7. TROUBLE CONCENTRATING ON THINGS, SUCH AS READING THE NEWSPAPER OR WATCHING TELEVISION: 0
SUM OF ALL RESPONSES TO PHQ QUESTIONS 1-9: 18
5. POOR APPETITE OR OVEREATING: 3
2. FEELING DOWN, DEPRESSED OR HOPELESS: 3
10. IF YOU CHECKED OFF ANY PROBLEMS, HOW DIFFICULT HAVE THESE PROBLEMS MADE IT FOR YOU TO DO YOUR WORK, TAKE CARE OF THINGS AT HOME, OR GET ALONG WITH OTHER PEOPLE: 0
3. TROUBLE FALLING OR STAYING ASLEEP: 3
SUM OF ALL RESPONSES TO PHQ QUESTIONS 1-9: 18
6. FEELING BAD ABOUT YOURSELF - OR THAT YOU ARE A FAILURE OR HAVE LET YOURSELF OR YOUR FAMILY DOWN: 3
SUM OF ALL RESPONSES TO PHQ QUESTIONS 1-9: 18

## 2021-07-13 ASSESSMENT — COLUMBIA-SUICIDE SEVERITY RATING SCALE - C-SSRS
6. HAVE YOU EVER DONE ANYTHING, STARTED TO DO ANYTHING, OR PREPARED TO DO ANYTHING TO END YOUR LIFE?: NO
2. HAVE YOU ACTUALLY HAD ANY THOUGHTS OF KILLING YOURSELF?: NO
1. WITHIN THE PAST MONTH, HAVE YOU WISHED YOU WERE DEAD OR WISHED YOU COULD GO TO SLEEP AND NOT WAKE UP?: NO

## 2021-07-13 NOTE — PROGRESS NOTES
Lai Concepcion (:  1980) is a 35 y.o. female,Established patient, here for evaluation of the following chief complaint(s):    Headache (patient has a headache head is tender and sensitive body is numb and tinlgling off and on having severe hot flashes when it comes and goes ), Back Pain (was injured at work last friday was given a muscle relaxer and lidcaine patch not helping she would like to see if you would prescribe something else), and Other (having mood swings since she started having her symptoms )      SUBJECTIVE/OBJECTIVE:  HPI  Lai Concepcion is a pleasant 35 y.o. female presenting to clinic today for ER follow-up. Headaches/migraines - pt reports ongoing frequent headaches/migraines; patient has previously been prescribed Fioricet and Maxalt; patient does report that Maxalt has been effective in treating her headaches. Patient reports that last week, she had a migraine which lasted several days which did feel slightly different and usual, patient states that head pain was located on top of head was tender to palpation; patient states that she did experience some paresthesias in extremities associated with migraine and top of head; patient states that headache did eventually subside and is not experiencing same headache today. Anxiety -patient does report ongoing anxiety but does report some mild improvement on Effexor which was initiated approximately 3 weeks ago, patient has tolerated this medication well so far and denies side effects. Back pain -patient seen in emergency department 2021 after injuring back at work; x-rays performed during ER visit negative for acute lumbar bony injury; patient reports right-sided low back pain with radiation to right lower extremity which has been consistent since discharge from emergency department; patient was prescribed Medrol Dosepak, muscle relaxer, lidocaine patch.   Patient was then seen in occupational health yesterday and was given prescription for tramadol for which she has not filled yet. Patient is uncomfortable today during visit due to back pain, patient unable to sit still etc.   Patient reports that she has been having difficulty sleeping due to pain, patient has been icing and applying heat to right lower back. Patient does report slight improvement since initial onset.     Current Outpatient Medications   Medication Sig Dispense Refill    rizatriptan (MAXALT) 5 MG tablet Take 1 tablet by mouth once as needed for Migraine May repeat in 2 hours if needed 9 tablet 3    tiZANidine (ZANAFLEX) 4 MG tablet Take 1 tablet by mouth 3 times daily 30 tablet 1    venlafaxine (EFFEXOR XR) 75 MG extended release capsule Take 1 capsule by mouth daily 60 capsule 1    methylPREDNISolone (MEDROL, JD,) 4 MG tablet Take by mouth. 1 kit 0    cyclobenzaprine (FLEXERIL) 10 MG tablet Take 1 tablet by mouth 3 times daily as needed for Muscle spasms (Patient taking differently: Take 10 mg by mouth 3 times daily as needed for Muscle spasms Prn not helping) 30 tablet 0    lidocaine (LIDODERM) 5 % Place 1 patch onto the skin daily 12 hours on, 12 hours off. 30 patch 0    butalbital-acetaminophen-caffeine (FIORICET, ESGIC) -40 MG per tablet Take 1-2 tablets by mouth every 4 hours as needed for Headaches (Patient taking differently: Take 1-2 tablets by mouth every 4 hours as needed for Headaches prn) 20 tablet 2    ibuprofen (ADVIL;MOTRIN) 800 MG tablet Take 1 tablet by mouth every 6 hours as needed for Pain (headaches) 120 tablet 2     No current facility-administered medications for this visit. Review of Systems   Constitutional: Negative for appetite change, chills, fatigue and fever. HENT: Negative for congestion, ear pain, hearing loss, rhinorrhea and sore throat. Eyes: Negative for photophobia, pain, discharge and redness. Respiratory: Negative for cough, chest tightness, shortness of breath and wheezing.     Cardiovascular: Negative for chest pain, palpitations and leg swelling. Gastrointestinal: Negative for abdominal pain, blood in stool, constipation, diarrhea, nausea and vomiting. Endocrine: Negative for polyuria. Genitourinary: Negative for difficulty urinating, dysuria, flank pain, frequency, hematuria and urgency. Musculoskeletal: Positive for arthralgias, back pain and gait problem. Negative for joint swelling. Skin: Negative for color change and rash. Neurological: Positive for headaches. Negative for dizziness, syncope, weakness and light-headedness. Hematological: Negative for adenopathy. Psychiatric/Behavioral: Negative for agitation, behavioral problems and suicidal ideas. The patient is nervous/anxious. Physical Exam  Vitals and nursing note reviewed. Constitutional:       General: She is not in acute distress. Appearance: She is obese. She is not ill-appearing. HENT:      Head: Normocephalic and atraumatic. Right Ear: External ear normal.      Left Ear: Tympanic membrane and external ear normal.      Ears:      Comments: Prior surgical intervention performed on right tympanic membrane and ear canal     Nose: No congestion or rhinorrhea. Mouth/Throat:      Mouth: Mucous membranes are moist.      Pharynx: No oropharyngeal exudate or posterior oropharyngeal erythema. Eyes:      Extraocular Movements: Extraocular movements intact. Pupils: Pupils are equal, round, and reactive to light. Neck:      Vascular: No carotid bruit. Cardiovascular:      Rate and Rhythm: Normal rate. Pulses: Normal pulses. Pulmonary:      Effort: Pulmonary effort is normal.   Abdominal:      Palpations: Abdomen is soft. Musculoskeletal:         General: Normal range of motion. Cervical back: Normal range of motion. No rigidity. Comments: Right lower back TTP; no midline pain. Skin:     General: Skin is warm and dry.       Capillary Refill: Capillary refill takes less than 2 seconds. Neurological:      General: No focal deficit present. Mental Status: She is alert and oriented to person, place, and time. Mental status is at baseline. Cranial Nerves: No cranial nerve deficit. Sensory: No sensory deficit. Motor: No weakness. Coordination: Coordination normal.      Gait: Gait normal.      Deep Tendon Reflexes: Reflexes normal.      Comments: Cranial nerve exam normal, Romberg testing normal. Negative [place]. Psychiatric:         Mood and Affect: Mood normal.         ASSESSMENT/PLAN:  1. Acute right-sided low back pain with right-sided sciatica   -Patient reports that she has not responded to muscle relaxer that was prescribed at ED; will trial alternate tizanidine; advised patient on side effects, monitoring, do not take and operate equipment etc.   -Spent considerable time educating patient on expected time course of lower back strain and avoidance of bedrest if possible.   -Will refer patient to physical therapy.   -Patient to fill tramadol prescription supplied by occupational health. -Patient has follow-up scheduled 7/23/2021.   -Patient denies any saddle anesthesias, incontinence, fever; advised patient to report to emergency department if any of the symptoms develop. -     tiZANidine (ZANAFLEX) 4 MG tablet; Take 1 tablet by mouth 3 times daily, Disp-30 tablet, [facility]  2. Chronic nonintractable headache, unspecified headache type   -Patient's headaches are likely multifactorial as discussed with patient at previous visit, will refer patient to neurology for further examination and treatment options. Patient denies any red flag symptoms today, denies fevers, vision changes, dizziness, near syncope, imbalance etc.   -Advised patient to not take Maxalt with Fioricet. Patient states understanding and reports that she has had benefit with Maxalt. -Recommend decrease caffeine intake.   -     rizatriptan (MAXALT) 5 MG tablet; Take 1 tablet by mouth once as needed for Migraine May repeat in 2 hours if needed, Disp-9 tablet, R-3Normal  -     [facility] - Ethrichi Nazario DO, [facility]  3. Anxiety   -Patient tolerating Effexor well so far; patient previously on this medication. Based on patient's response, will increase Effexor dose and patient has follow-up visit scheduled next week. -     venlafaxine (EFFEXOR XR) 75 MG extended release capsule; Take 1 capsule by mouth daily, Disp-60 capsule, R-1Normal      Return if symptoms worsen or fail to improve, for Follow Up. An electronic signature was used to authenticate this note.     --KY Ford

## 2021-07-14 ASSESSMENT — ENCOUNTER SYMPTOMS
CONSTIPATION: 0
VOMITING: 0
COUGH: 0
SORE THROAT: 0
ABDOMINAL PAIN: 0
BACK PAIN: 1
RHINORRHEA: 0
PHOTOPHOBIA: 0
EYE DISCHARGE: 0
BLOOD IN STOOL: 0
WHEEZING: 0
DIARRHEA: 0
EYE REDNESS: 0
NAUSEA: 0
SHORTNESS OF BREATH: 0
COLOR CHANGE: 0
CHEST TIGHTNESS: 0
EYE PAIN: 0

## 2021-07-23 ENCOUNTER — HOSPITAL ENCOUNTER (OUTPATIENT)
Dept: PHYSICAL THERAPY | Age: 41
Setting detail: THERAPIES SERIES
Discharge: HOME | End: 2021-07-23
Payer: COMMERCIAL

## 2021-07-23 PROCEDURE — 97012 MECHANICAL TRACTION THERAPY: CPT

## 2021-07-23 PROCEDURE — 97530 THERAPEUTIC ACTIVITIES: CPT

## 2021-07-23 PROCEDURE — 97162 PT EVAL MOD COMPLEX 30 MIN: CPT

## 2021-07-23 PROCEDURE — 97110 THERAPEUTIC EXERCISES: CPT

## 2021-07-23 NOTE — FLOWSHEET NOTE
Outpatient Physical Therapy  [place]           [x] Phone: [phone]   Fax: [phone]  [place]           [] Phone: [phone]   Fax: [phone]        Physical Therapy Daily Treatment Note  Date:  2021    Patient Name:  Tomasa Cranker    :  1980  MRN: [id number]  Restrictions/Precautions:    Diagnosis:     Acute right-sided low back pain with right-sided sciatica M54.41  Date of Injury/Surgery: 2021  Treatment Diagnosis:    M62.81 muscle weakness, R26.89 abnormality of gait, M54.5 LBP  Insurance/Certification information:     Referring Physician:    KY Hare  Next Doctor Visit:    Plan of care signed (Y/N):    Outcome Measure: Mod Oswestry: 74% disability   Visit# / total visits:   1/  Pain level: 6-7/10   Goals:       Long term goals to be achieved by : 21  Long term goal 1: Pt will demonstrate I with current HEP as prescribed in order to increase ROM, strength, balance, endurance, functional mobility, and decrease pain. Long term goal 2: Pt will demonstrate Mod Oswestry disability score of 30% to improve quality of life. Long term goal 3: Pt will demonstrate no worse pain than 3/10 with activity to improve quality of life. Long term goal 4: Pt will demonstrate no limp and increased stance time on R LE during gait to improve functional mobility. Subjective:  See eval         Any changes in Ambulatory Summary Sheet?   None        Objective:  See eval   COVID screening questions were asked and patient attested that there had been no contact or symptoms        Exercises: (No more than 4 columns)   Exercise/Equipment Date 21 Date Date           WARM UP                     TABLE      TA contraction x10 5\" hold                                 STANDING                                                     PROPRIOCEPTION                                    MODALITIES       Lumbar traction x20' see below                Other Therapeutic Activities/Education:  Pt given education on how core muscles can help decrease pressure on the spine. Pt also educated on how traction distracts the spine to help decrease pressure. Home Exercise Program:  TA contraction       Manual Treatments:  None       Modalities:  Lumbar traction, 20' intermittent, max level 65 lbs, min level 30 lbs, hold 99 sec and rest 20 sec, progressive 2 steps and regressive 3 steps. Communication with other providers:  PT present       Assessment:  (Response towards treatment session) (Pain Rating) pt rates her pain 3/10 after lumbar traction and says she feels like she can stand a lot taller. After lumbar traction pt did not limp as badly and Increased her stance time on her R leg compared to when she first stepped into the clinic.             Plan for Next Session:  cont lumbar traction next 6 sessions and for next 2 sessions include e stim,       Time In / Time Out:    1012/1110       If [facility] Please Indicate Time In/Out/Total Time  CPT Code Time in Time out Total Time   59555(mod eval)   1012  1032  20   46523(Lumbar traction)  1033  1053  20   76523 (te)  1054  1101  8   05058 (ta)  1102  1110  10                        Total for session      58'       Timed Code/Total Treatment Minutes:  58'/ 1 mod eval, 1 traction, 1te, 1ta      Next Progress Note due:  8/27/21      Plan of Care Interventions:  [x] Therapeutic Exercise  [] Modalities:  [x] Therapeutic Activity     [] Ultrasound  [x] Estim  [] Gait Training      [] Cervical Traction [x] Lumbar Traction  [x] Neuromuscular Re-education    [] Cold/hotpack [] Iontophoresis   [x] Instruction in HEP      [] Vasopneumatic   [] Dry Needling    [] Manual Therapy               [] Aquatic Therapy              Electronically signed by:  Thomas Hutchins, SPT  7/23/2021, 10:44 AM

## 2021-07-23 NOTE — PROGRESS NOTES
Physical Therapy         [facility] Outpatient Physical Therapy  [address] [place] [id number]  Phone: [phone]  Fax: [phone]      PHYSICAL THERAPY INITIAL ASSESSMENT      Date: 2021  Patient Name: Dottie Venegas   : 1980  Referred by: KY Hilton  Reason for Referral: Acute right-sided low back pain with right-sided sciatica M54.41  PT Impression: M62.81 muscle weakness, R26.89 abnormality of gait  Insurance: [facility]  Restrictions/Precautions:     Subjective   Chart Reviewed: Yes   Patient assessed for rehabilitation services?: Yes   Family / Caregiver Present: No  Follows Commands: Within Functional Limits   Date of onset:  2021  Subjective: Pt states that she was trying to lift approx 2 lb [place] off of floor and when going back to standing had sharp pain going into back. Pain went down her R side, traveled down leg all way to foot. Current Situation: Has not been working since the incident. Pt reports difficulty sleeping. Observation: FWD flexed posture walking and sitting  Medication: updated in EMR    Pain Screening   Patient Currently in Pain:   Pain Assessment: 0-10   Pain Level: 6-7/10    Worst pain: 10/10, any movement   Best pain:   5-6/10, heating pad and ice, resting/not moving,   Sensation: numb from LB into Mid R buttock     Vision/Hearing   Vision: impaired. Glasses for just reading   Hearing: Within functional limits mod hearing loss in R ear. Home Living  Lives With: 2 kids   Type of Home: 2 story   Home Layout:  Bedroom and full bath upstairs   Steps/Hand rails:  6 steps to get into house with railing   Work: was working at [employer], 6-330 pm. Does not know if she wants to go back.    Hobbies: watching kids play sports   Prior level of function:   I ADLS  Patient reports hardest things at home: 1) walk  2) cleaning 3) standing or sitting for long periods of time   Patient goals: reduce pain and get back to moving around like normal.     Orientation: x3    Objective  FFTF: 19 in, P  L SB: 25 in, P   R SB:  25 in, P     Strength LE  Hip:   Right Left   Hip flexion         4+/5 P       5 /5   Hip abd       5 /5 P        5/5   Hip add       5 /5 P        5/5     Knee:    Right Left   Knee flexion        5/5 P        5/5   Knee extension       5 /5 P       5 5     Ankle:   Right Left   Ankle DF        5 /5 P       5 /5        Bed Mobility: Pt states most comfortable position is laying on back     Transfers  Sit to Stand/stand to sit: Pt uses arms of chair to help slowly lower herself down, pt uses chair to slowly push up from as well. Ambulation  Ambulation?: Yes  WB Status: FWB  Surface: level tile  Device: None   Assistance: Indep  Quality of Gait: trunk fwd flexed, decreased hip extension B, limp/decreased stance time on R    Stairs: pt states she goes up one step at a time using the banister to hold onto     Additional Tests:    Mod Oswestry: 37/50= 74% disability   PT performed manual lumbar distraction while pt was supine on table. This did not increase pt's pain. Assessment   Decreased functional mobility ; Decreased strength;Decreased endurance;Decreased high-level IADLs;Decreased ADL status; Decreased ROM; Assessment: Pt is a pleasant 34 yo female who would benefit from skilled PT to address decreased ROM, strength, balance, endurance, functional mobility, and increased pain. Prognosis: Good  Discharge Recommendations: Patient would benefit from additional therapy;Continue to assess pending progress,   Requires PT Follow Up: Yes  Activity Tolerance: Patient Tolerated treatment well. Treatment Administered: See flowsheet  Patient Education: See flowsheet  Learning Style: any    Plan   Plan of care initiated  Frequency and duration of tx:  3x week for 2 weeks, then 2x week for 4 weeks   Barriers include: none  Treatment:  1.  Therapeutic exercises including ROM, PREs, stretching, strengthening, and

## 2021-07-26 ENCOUNTER — HOSPITAL ENCOUNTER (OUTPATIENT)
Dept: PHYSICAL THERAPY | Age: 41
Setting detail: THERAPIES SERIES
Discharge: HOME | End: 2021-07-26
Payer: COMMERCIAL

## 2021-07-26 PROCEDURE — 97012 MECHANICAL TRACTION THERAPY: CPT

## 2021-07-26 PROCEDURE — G0283 ELEC STIM OTHER THAN WOUND: HCPCS

## 2021-07-26 NOTE — FLOWSHEET NOTE
Outpatient Physical Therapy  [place]           [x] Phone: [phone]   Fax: [phone]  [place]           [] Phone: [phone]   Fax: [phone]        Physical Therapy Daily Treatment Note  Date:  2021    Patient Name:  Eduardo Moran    :  1980  MRN: [id number]  Restrictions/Precautions:    Diagnosis:     Acute right-sided low back pain with right-sided sciatica M54.41  Date of Injury/Surgery: 2021  Treatment Diagnosis:    M62.81 muscle weakness, R26.89 abnormality of gait, M54.5 LBP  Insurance/Certification information:     Referring Physician:    KY Solano  Next Doctor Visit:    Plan of care signed (Y/N):    Outcome Measure: Mod Oswestry: 74% disability   Visit# / total visits:   2/  Pain level: 4-5/10   Goals:       Long term goals to be achieved by : 21  Long term goal 1: Pt will demonstrate I with current HEP as prescribed in order to increase ROM, strength, balance, endurance, functional mobility, and decrease pain. Long term goal 2: Pt will demonstrate Mod Oswestry disability score of 30% to improve quality of life. Long term goal 3: Pt will demonstrate no worse pain than 3/10 with activity to improve quality of life. Long term goal 4: Pt will demonstrate no limp and increased stance time on R LE during gait to improve functional mobility. Subjective:  Patient states that she got about 2 hours of relief of her pain after last visit. Continues to have the numbness from her back to the middle of the right buttock. Any changes in Ambulatory Summary Sheet?   None        Objective:  See eval   COVID screening questions were asked and patient attested that there had been no contact or symptoms        Exercises: (No more than 4 columns)   Exercise/Equipment Date 21 Date Date           WARM UP                     TABLE      TA contraction x10 5\" hold                                 STANDING PROPRIOCEPTION                                    MODALITIES       Lumbar traction x20' see below                Other Therapeutic Activities/Education:  Pt given education on how core muscles can help decrease pressure on the spine. Pt also educated on how traction distracts the spine to help decrease pressure. Home Exercise Program:  TA contraction       Manual Treatments:  None       Modalities:  Lumbar traction, 20' intermittent, max level 65 lbs, min level 30 lbs, hold 99 sec and rest 20 sec, progressive 2 steps and regressive 3 steps. Communication with other providers:  PT present       Assessment:  (Response towards treatment session) (Pain Rating) Patient noted some tingling in the right foot with three minutes left of traction. Dissipated when patient returned to standing. Rated her back pain 3/10 after treatment.            Plan for Next Session:  cont lumbar traction next 6 sessions and for next 2 sessions include e stim,       Time In / Time Out:   1515/1555       If [facility] Please Indicate Time In/Out/Total Time  CPT Code Time in Time out Total Time         82762(Lumbar traction)  0148 6384 10   60109 (te)       96804 (ta)       E stim 1535 1555 20                Total for session    40       Timed Code/Total Treatment Minutes:  36'       Next Progress Note due:  8/27/21      Plan of Care Interventions:  [x] Therapeutic Exercise  [] Modalities:  [x] Therapeutic Activity     [] Ultrasound  [x] Estim  [] Gait Training      [] Cervical Traction [x] Lumbar Traction  [x] Neuromuscular Re-education    [] Cold/hotpack [] Iontophoresis   [x] Instruction in HEP      [] Vasopneumatic   [] Dry Needling    [] Manual Therapy               [] Aquatic Therapy              Electronically signed by:  Juan Pablo Major PTA  7/26/2021, 3:27 PM

## 2021-07-28 ENCOUNTER — HOSPITAL ENCOUNTER (OUTPATIENT)
Dept: PHYSICAL THERAPY | Age: 41
Setting detail: THERAPIES SERIES
Discharge: HOME | End: 2021-07-28
Payer: COMMERCIAL

## 2021-07-28 PROCEDURE — G0283 ELEC STIM OTHER THAN WOUND: HCPCS

## 2021-07-28 PROCEDURE — 97012 MECHANICAL TRACTION THERAPY: CPT

## 2021-07-28 NOTE — FLOWSHEET NOTE
Outpatient Physical Therapy  [place]           [x] Phone: [phone]   Fax: [phone]  [place]           [] Phone: [phone]   Fax: [phone]        Physical Therapy Daily Treatment Note  Date:  2021    Patient Name:  Meenu Morillo    :  1980  MRN: [id number]  Restrictions/Precautions:    Diagnosis:     Acute right-sided low back pain with right-sided sciatica M54.41  Date of Injury/Surgery: 2021  Treatment Diagnosis:    M62.81 muscle weakness, R26.89 abnormality of gait, M54.5 LBP  Insurance/Certification information:     Referring Physician:    KY Paris  Next Doctor Visit:    Plan of care signed (Y/N):    Outcome Measure: Mod Oswestry: 74% disability   Visit# / total visits:   3/  Pain level: 0/10   Goals:       Long term goals to be achieved by : 21  Long term goal 1: Pt will demonstrate I with current HEP as prescribed in order to increase ROM, strength, balance, endurance, functional mobility, and decrease pain. Long term goal 2: Pt will demonstrate Mod Oswestry disability score of 30% to improve quality of life. Long term goal 3: Pt will demonstrate no worse pain than 3/10 with activity to improve quality of life. Long term goal 4: Pt will demonstrate no limp and increased stance time on R LE during gait to improve functional mobility. Subjective:  Patient denies any pain in the LB today and did not have any yesterday. Continues to have the numbness in the R LB and glut area. Feels that she is able to move better. Any changes in Ambulatory Summary Sheet? None        Objective:  Patient demonstrates upright posture.   Able to forward flex WFL without pain  COVID screening questions were asked and patient attested that there had been no contact or symptoms        Exercises: (No more than 4 columns)   Exercise/Equipment Date 21 Date  2021 Date           WARM UP                     TABLE      TA contraction x10 5\" hold STANDING                                                     PROPRIOCEPTION                                    MODALITIES       Lumbar traction x20' see below  Lumbar traction x20' see below               Other Therapeutic Activities/Education:  Pt given education on how core muscles can help decrease pressure on the spine. Pt also educated on how traction distracts the spine to help decrease pressure. Home Exercise Program:  TA contraction       Manual Treatments:  None       Modalities:  Lumbar traction, 20' intermittent, max level 70 lbs, min level 30 lbs, hold 99 sec and rest 20 sec, progressive 2 steps and regressive 3 steps. Premod estim to right LB and glut x15 min in prone position. Intensity to patient tolerance. Communication with other providers:  PT present       Assessment:  (Response towards treatment session) (Pain Rating) 0/10 pain in the LB at the end of treatment. Did not have any change in her numbness.            Plan for Next Session:  cont lumbar traction next 6 sessions and for next 2 sessions include e stim,       Time In / Time Out:   4494/2411       If [facility] Please Indicate Time In/Out/Total Time  CPT Code Time in Time out Total Time         40264(Lumbar traction)  0814 0834 20   13956 (te)       19336 (ta)       E stim 0834 0854 20                Total for session    40       Timed Code/Total Treatment Minutes:  36'       Next Progress Note due:  8/27/21      Plan of Care Interventions:  [x] Therapeutic Exercise  [] Modalities:  [x] Therapeutic Activity     [] Ultrasound  [x] Estim  [] Gait Training      [] Cervical Traction [x] Lumbar Traction  [x] Neuromuscular Re-education    [] Cold/hotpack [] Iontophoresis   [x] Instruction in HEP      [] Vasopneumatic   [] Dry Needling    [] Manual Therapy               [] Aquatic Therapy              Electronically signed by:  Mary Kay Mason PTA  7/28/2021, 8:14 AM

## 2021-07-30 ENCOUNTER — HOSPITAL ENCOUNTER (OUTPATIENT)
Dept: PHYSICAL THERAPY | Age: 41
Setting detail: THERAPIES SERIES
Discharge: HOME | End: 2021-07-30
Payer: COMMERCIAL

## 2021-07-30 PROCEDURE — 97012 MECHANICAL TRACTION THERAPY: CPT

## 2021-07-30 PROCEDURE — 97530 THERAPEUTIC ACTIVITIES: CPT

## 2021-07-30 NOTE — FLOWSHEET NOTE
Outpatient Physical Therapy  [place]           [x] Phone: [phone]   Fax: [phone]  [place]           [] Phone: [phone]   Fax: [phone]        Physical Therapy Daily Treatment Note  Date:  2021    Patient Name:  Tyrone Moser    :  1980  MRN: [id number]  Restrictions/Precautions:    Diagnosis:     Acute right-sided low back pain with right-sided sciatica M54.41  Date of Injury/Surgery: 2021  Treatment Diagnosis:    M62.81 muscle weakness, R26.89 abnormality of gait, M54.5 LBP  Insurance/Certification information:     Referring Physician:    KY Benitez  Next Doctor Visit:    Plan of care signed (Y/N):    Outcome Measure: Mod Oswestry: 74% disability   Visit# / total visits:   4/  Pain level: 3/10   Goals:       Long term goals to be achieved by : 21  Long term goal 1: Pt will demonstrate I with current HEP as prescribed in order to increase ROM, strength, balance, endurance, functional mobility, and decrease pain. Long term goal 2: Pt will demonstrate Mod Oswestry disability score of 30% to improve quality of life. Long term goal 3: Pt will demonstrate no worse pain than 3/10 with activity to improve quality of life. Long term goal 4: Pt will demonstrate no limp and increased stance time on R LE during gait to improve functional mobility. Subjective:  Patient reports 3/10 pain today. Continues to have the numbness in the R LB and glut area. Sitting for long periods still irritates her LB. Any changes in Ambulatory Summary Sheet? None        Objective:  Patient demonstrates upright posture. Patient still slightly limping during gait.    COVID screening questions were asked and patient attested that there had been no contact or symptoms        Exercises: (No more than 4 columns)   Exercise/Equipment Date 21 Date  2021 Date: 21           WARM UP                     TABLE      TA contraction x10 5\" hold STANDING                                                     PROPRIOCEPTION                                    MODALITIES       Lumbar traction x20' see below  Lumbar traction x20' see below  Lumbar traction x20' see below              Other Therapeutic Activities/Education:        Home Exercise Program:  TA contraction       Manual Treatments:  None       Modalities:  Lumbar traction, 20' intermittent, max level 70 lbs, min level 30 lbs, hold 99 sec and rest 20 sec, progressive 2 steps and regressive 3 steps. Communication with other providers:  PT present       Assessment:  (Response towards treatment session) (Pain Rating)  3/10 pain in the LB at the end of treatment. Did not have any change in her numbness. Pt had decreased limping when walking out of clinic following treatment.           Plan for Next Session:  cont lumbar traction next 6 sessions and for next 2 sessions include e stim, TA contraction series      Time In / Time Out:   0304/4710       If [facility] Please Indicate Time In/Out/Total Time  CPT Code Time in Time out Total Time         30466(Lumbar traction)  0920 0940 20   18384 (te)       77276 (ta)  0902 0920 18   E stim                   Total for session    38       Timed Code/Total Treatment Minutes:  45'       Next Progress Note due:  8/27/21      Plan of Care Interventions:  [x] Therapeutic Exercise  [] Modalities:  [x] Therapeutic Activity     [] Ultrasound  [x] Estim  [] Gait Training      [] Cervical Traction [x] Lumbar Traction  [x] Neuromuscular Re-education    [] Cold/hotpack [] Iontophoresis   [x] Instruction in HEP      [] Vasopneumatic   [] Dry Needling    [] Manual Therapy               [] Aquatic Therapy              Electronically signed by:  PATITO Pink  7/30/2021, 9:19 AM

## 2021-08-05 ENCOUNTER — HOSPITAL ENCOUNTER (OUTPATIENT)
Dept: PHYSICAL THERAPY | Age: 41
Setting detail: THERAPIES SERIES
Discharge: HOME | End: 2021-08-05
Payer: COMMERCIAL

## 2021-08-05 PROCEDURE — 97012 MECHANICAL TRACTION THERAPY: CPT

## 2021-08-05 NOTE — FLOWSHEET NOTE
Outpatient Physical Therapy  [place]           [x] Phone: [phone]   Fax: [phone]  [place]           [] Phone: [phone]   Fax: [phone]        Physical Therapy Daily Treatment Note  Date:  2021    Patient Name:  Snow Mensah    :  1980  MRN: [id number]  Restrictions/Precautions:    Diagnosis:     Acute right-sided low back pain with right-sided sciatica M54.41  Date of Injury/Surgery: 2021  Treatment Diagnosis:    M62.81 muscle weakness, R26.89 abnormality of gait, M54.5 LBP  Insurance/Certification information:     Referring Physician:    KY Cordova  Next Doctor Visit:    Plan of care signed (Y/N):    Outcome Measure: Mod Oswestry: 74% disability   Visit# / total visits:   5/  Pain level: 3/10   Goals:       Long term goals to be achieved by : 21  Long term goal 1: Pt will demonstrate I with current HEP as prescribed in order to increase ROM, strength, balance, endurance, functional mobility, and decrease pain. Long term goal 2: Pt will demonstrate Mod Oswestry disability score of 30% to improve quality of life. Long term goal 3: Pt will demonstrate no worse pain than 3/10 with activity to improve quality of life. Long term goal 4: Pt will demonstrate no limp and increased stance time on R LE during gait to improve functional mobility. Subjective:  Is having more pain in the back today, but thinks that she slept wrong last night. Had her follow up with [facility] and they will be ordering more therapy. Continues to have the numbness in the right glute and LB. If she is sitting for a longer period of time she will have numbness down into her big toe. Has been able to walk more at work which is helping. Has not been doing the TA contractions at home. Any changes in Ambulatory Summary Sheet? None        Objective:  No noted difficulty with transitional movements.    COVID screening questions were asked and patient attested that there had been no contact or symptoms        Exercises: (No more than 4 columns)   Exercise/Equipment Date 7/23/21 Date  7/28/2021 Date: 7/30/21           WARM UP                     TABLE      TA contraction x10 5\" hold                                 STANDING                                                     PROPRIOCEPTION                                    MODALITIES       Lumbar traction x20' see below  Lumbar traction x20' see below  Lumbar traction x20' see below              Other Therapeutic Activities/Education:        Home Exercise Program:  TA contraction       Manual Treatments:  None       Modalities:  Lumbar traction, 20' intermittent, max level 75 lbs, min level 50 lbs, hold 99 sec and rest 20 sec, progressive 2 steps and regressive 3 steps. Communication with other providers:  PT present       Assessment:  (Response towards treatment session) (Pain Rating)  Patient rated her LBP 3-4/10 after treatment.            Plan for Next Session:  cont lumbar traction next 6 sessions and for next 2 sessions include e stim, TA contraction series      Time In / Time Out:   1705/1730       If [facility] Please Indicate Time In/Out/Total Time  CPT Code Time in Time out Total Time         46502(Lumbar traction)  1705 1730 25   15235 (te)       01567 (ta)       E stim                   Total for session           Timed Code/Total Treatment Minutes:  22'       Next Progress Note due:  8/27/21      Plan of Care Interventions:  [x] Therapeutic Exercise  [] Modalities:  [x] Therapeutic Activity     [] Ultrasound  [x] Estim  [] Gait Training      [] Cervical Traction [x] Lumbar Traction  [x] Neuromuscular Re-education    [] Cold/hotpack [] Iontophoresis   [x] Instruction in HEP      [] Vasopneumatic   [] Dry Needling    [] Manual Therapy               [] Aquatic Therapy              Electronically signed by:  Chelsea García PTA   8/5/2021, 5:07 PM

## 2021-08-12 ENCOUNTER — HOSPITAL ENCOUNTER (OUTPATIENT)
Dept: PHYSICAL THERAPY | Age: 41
Setting detail: THERAPIES SERIES
Discharge: HOME | End: 2021-08-12
Payer: COMMERCIAL

## 2021-08-12 PROCEDURE — 97012 MECHANICAL TRACTION THERAPY: CPT

## 2021-08-12 NOTE — FLOWSHEET NOTE
Outpatient Physical Therapy  [place]           [x] Phone: [phone]   Fax: [phone]  [place]           [] Phone: [phone]   Fax: [phone]        Physical Therapy Daily Treatment Note  Date:  2021    Patient Name:  Rima Don    :  1980  MRN: [id number]  Restrictions/Precautions:    Diagnosis:     Acute right-sided low back pain with right-sided sciatica M54.41  Date of Injury/Surgery: 2021  Treatment Diagnosis:    M62.81 muscle weakness, R26.89 abnormality of gait, M54.5 LBP  Insurance/Certification information:     Referring Physician:    KY Marie  Next Doctor Visit:    Plan of care signed (Y/N):    Outcome Measure: Mod Oswestry: 74% disability   Visit# / total visits:   6/  Pain level: 3/10   Goals:       Long term goals to be achieved by : 21  Long term goal 1: Pt will demonstrate I with current HEP as prescribed in order to increase ROM, strength, balance, endurance, functional mobility, and decrease pain. Long term goal 2: Pt will demonstrate Mod Oswestry disability score of 30% to improve quality of life. Long term goal 3: Pt will demonstrate no worse pain than 3/10 with activity to improve quality of life. Long term goal 4: Pt will demonstrate no limp and increased stance time on R LE during gait to improve functional mobility. Subjective:  Patient rates her pain 3/10 in the LB today. Continues to have the numbness in the right side of the LB into the buttock. Had her Dad rub her back the other night and she states it felt better afterward. Next follow up with [facility] is . Any changes in Ambulatory Summary Sheet? None        Objective:  No noted difficulty with transitional movements.    COVID screening questions were asked and patient attested that there had been no contact or symptoms        Exercises: (No more than 4 columns)   Exercise/Equipment Date 21 Date  2021 Date: 21           WARM UP TABLE      TA contraction x10 5\" hold                                 STANDING                                                     PROPRIOCEPTION                                    MODALITIES       Lumbar traction x20' see below  Lumbar traction x20' see below  Lumbar traction x20' see below              Other Therapeutic Activities/Education:        Home Exercise Program:  TA contraction       Manual Treatments:  None       Modalities:  Lumbar traction, 20' intermittent, max level 75 lbs, min level 50 lbs, hold 99 sec and rest 20 sec, progressive 2 steps and regressive 3 steps. Communication with other providers:  PT present       Assessment:  (Response towards treatment session) (Pain Rating)  Patient rated her LBP 3/10 after treatment.            Plan for Next Session:  cont lumbar traction next 6 sessions and for next 2 sessions include e stim, TA contraction series      Time In / Time Out:  1658/1725     If [facility] Please Indicate Time In/Out/Total Time  CPT Code Time in Time out Total Time         87890(Lumbar traction)  4493 4853 88   27207 (te)       29854 (ta)       E stim                   Total for session           Timed Code/Total Treatment Minutes:  32'       Next Progress Note due:  8/27/21      Plan of Care Interventions:  [x] Therapeutic Exercise  [] Modalities:  [x] Therapeutic Activity     [] Ultrasound  [x] Estim  [] Gait Training      [] Cervical Traction [x] Lumbar Traction  [x] Neuromuscular Re-education    [] Cold/hotpack [] Iontophoresis   [x] Instruction in HEP      [] Vasopneumatic   [] Dry Needling    [] Manual Therapy               [] Aquatic Therapy              Electronically signed by:  Nava Medina PTA   8/12/2021, 5:08 PM

## 2021-08-13 DIAGNOSIS — M54.41 ACUTE RIGHT-SIDED LOW BACK PAIN WITH RIGHT-SIDED SCIATICA: ICD-10-CM

## 2021-08-13 DIAGNOSIS — R51.9 CHRONIC NONINTRACTABLE HEADACHE, UNSPECIFIED HEADACHE TYPE: ICD-10-CM

## 2021-08-13 DIAGNOSIS — G89.29 CHRONIC NONINTRACTABLE HEADACHE, UNSPECIFIED HEADACHE TYPE: ICD-10-CM

## 2021-08-17 RX ORDER — BUTALBITAL, ACETAMINOPHEN AND CAFFEINE 50; 325; 40 MG/1; MG/1; MG/1
1-2 TABLET ORAL EVERY 4 HOURS PRN
Qty: 20 TABLET | Refills: 2 | Status: SHIPPED | OUTPATIENT
Start: 2021-08-17 | End: 2021-11-08 | Stop reason: SDUPTHER

## 2021-08-17 RX ORDER — RIZATRIPTAN BENZOATE 5 MG/1
5 TABLET ORAL
Qty: 9 TABLET | Refills: 3 | Status: SHIPPED | OUTPATIENT
Start: 2021-08-17 | End: 2021-10-29 | Stop reason: ALTCHOICE

## 2021-08-17 RX ORDER — TIZANIDINE 4 MG/1
4 TABLET ORAL 3 TIMES DAILY
Qty: 30 TABLET | Refills: 1 | Status: SHIPPED | OUTPATIENT
Start: 2021-08-17 | End: 2021-10-29 | Stop reason: ALTCHOICE

## 2021-08-19 ENCOUNTER — HOSPITAL ENCOUNTER (OUTPATIENT)
Dept: PHYSICAL THERAPY | Age: 41
Setting detail: THERAPIES SERIES
Discharge: HOME | End: 2021-08-19
Payer: COMMERCIAL

## 2021-08-19 PROCEDURE — 97012 MECHANICAL TRACTION THERAPY: CPT

## 2021-08-19 NOTE — FLOWSHEET NOTE
MODALITIES       Lumbar traction x20' see below  Lumbar traction x20' see below  Lumbar traction x20' see below              Other Therapeutic Activities/Education:        Home Exercise Program:  TA contraction       Manual Treatments:  None       Modalities:  Lumbar traction, 20' intermittent, max level 75 lbs, min level 50 lbs, hold 60 sec and rest 20 sec, progressive 2 steps and regressive 2 steps. Communication with other providers:  PT present       Assessment:  (Response towards treatment session) (Pain Rating)  Patient rated her LBP 3/10 after treatment.   Noted that she was able to move better         Plan for Next Session:  cont lumbar traction next 6 sessions and for next 2 sessions include e stim, TA contraction series      Time In / Time Out:  1705/1730     If [facility] Please Indicate Time In/Out/Total Time  CPT Code Time in Time out Total Time         86686(Lumbar traction)  1705 1730 25   78626 (te)       73661 (ta)       E stim                   Total for session    25       Timed Code/Total Treatment Minutes:  22'       Next Progress Note due:  8/27/21      Plan of Care Interventions:  [x] Therapeutic Exercise  [] Modalities:  [x] Therapeutic Activity     [] Ultrasound  [x] Estim  [] Gait Training      [] Cervical Traction [x] Lumbar Traction  [x] Neuromuscular Re-education    [] Cold/hotpack [] Iontophoresis   [x] Instruction in HEP      [] Vasopneumatic   [] Dry Needling    [] Manual Therapy               [] Aquatic Therapy              Electronically signed by:  Charbel Allen PTA   8/19/2021, 4:55 PM

## 2021-08-24 NOTE — FLOWSHEET NOTE
Worker's comp extended the end date on the C-9 to 09/10/2021. Updated C-9 scanned and filed in patient's chart.

## 2021-08-25 ENCOUNTER — HOSPITAL ENCOUNTER (OUTPATIENT)
Dept: PHYSICAL THERAPY | Age: 41
Setting detail: THERAPIES SERIES
Discharge: HOME | End: 2021-08-25
Payer: COMMERCIAL

## 2021-08-25 PROCEDURE — 97012 MECHANICAL TRACTION THERAPY: CPT

## 2021-08-25 NOTE — FLOWSHEET NOTE
Outpatient Physical Therapy  [place]           [x] Phone: [phone]   Fax: [phone]  [facility]           [] Phone: [phone]   Fax: [phone]        Physical Therapy Daily Treatment Note  Date:  2021    Patient Name:  Terri Claros    :  1980  MRN: [id number]  Restrictions/Precautions:    Diagnosis:     Acute right-sided low back pain with right-sided sciatica M54.41  Date of Injury/Surgery: 2021  Treatment Diagnosis:    M62.81 muscle weakness, R26.89 abnormality of gait, M54.5 LBP  Insurance/Certification information:     Referring Physician:    KY Pederson  Next Doctor Visit:    Plan of care signed (Y/N):    Outcome Measure: Mod Oswestry: 74% disability   Visit# / total visits:   8/  Pain level: 5/10   Goals:       Long term goals to be achieved by : 21  Long term goal 1: Pt will demonstrate I with current HEP as prescribed in order to increase ROM, strength, balance, endurance, functional mobility, and decrease pain. Long term goal 2: Pt will demonstrate Mod Oswestry disability score of 30% to improve quality of life. Long term goal 3: Pt will demonstrate no worse pain than 3/10 with activity to improve quality of life. Long term goal 4: Pt will demonstrate no limp and increased stance time on R LE during gait to improve functional mobility. Subjective:  Patient reports of 5/10 pain upon arrival and reports it hurts very very bad if she  Sits for a long time, walking for long periods bother her. Having trouble standing up straight she reports it feels as if she has a bulge there and her right side low back to her glut cheek is still numb. Any changes in Ambulatory Summary Sheet? None        Objective:   To see phys on Aug 30th     COVID screening questions were asked and patient attested that there had been no contact or symptoms        Exercises: (No more than 4 columns)   Exercise/Equipment Date  2021 Date: 21 WARM UP                     TABLE      TA contraction                                 STANDING                                                     PROPRIOCEPTION                                    MODALITIES       Lumbar traction x20' see below  Lumbar traction x20' see below  Lumbar traction x20' see below              Other Therapeutic Activities/Education:        Home Exercise Program:  TA contraction       Manual Treatments:  None       Modalities:  Lumbar traction, 20' intermittent, max level 78 lbs, min level 50 lbs, hold 60 sec and rest 20 sec, progressive 2 steps and regressive 2 steps. Communication with other providers:  PT present       Assessment:  (Response towards treatment session) (Pain Rating)  Patient rated her LBP 3/10 after treatment.   Pain in the sacrum but feels much better from when she came in         Plan for Next Session:  cont lumbar traction next 6 sessions and for next 2 sessions include e stim, TA contraction series      Time In / Time Out:  1719/1748     If [facility] Please Indicate Time In/Out/Total Time  CPT Code Time in Time out Total Time         71539(Lumbar traction)  0978  8240  92   14343 (te)       17864 (ta)       E stim                   Total for session            Timed Code/Total Treatment Minutes:  29trac        Next Progress Note due:  8/27/21      Plan of Care Interventions:  [x] Therapeutic Exercise  [] Modalities:  [x] Therapeutic Activity     [] Ultrasound  [x] Estim  [] Gait Training      [] Cervical Traction [x] Lumbar Traction  [x] Neuromuscular Re-education    [] Cold/hotpack [] Iontophoresis   [x] Instruction in HEP      [] Vasopneumatic   [] Dry Needling    [] Manual Therapy               [] Aquatic Therapy              Electronically signed by:  Maria Bernard PTA   8/25/2021, 5:19 PM

## 2021-09-01 ENCOUNTER — HOSPITAL ENCOUNTER (OUTPATIENT)
Dept: PHYSICAL THERAPY | Age: 41
Discharge: HOME | End: 2021-09-01

## 2021-09-01 NOTE — FLOWSHEET NOTE
Physical Therapy  Cancellation/No-show Note  Patient Name:  Britta Weber  :  1980   Date:  2021  Cancelled visits to date: 3  No-shows to date: 1    For today's appointment patient:  [x]  Cancelled  []  Rescheduled appointment  []  No-show     Reason given by patient:  [x]  Patient ill  []  Conflicting appointment  []  No transportation    []  Conflict with work  []  No reason given  []  Other:     Comments:      Electronically signed by:  Madhu Alanis  2021, 4:29 PM

## 2021-09-04 DIAGNOSIS — F41.9 ANXIETY: ICD-10-CM

## 2021-09-07 RX ORDER — VENLAFAXINE HYDROCHLORIDE 75 MG/1
CAPSULE, EXTENDED RELEASE ORAL
Qty: 60 CAPSULE | Refills: 1 | Status: SHIPPED | OUTPATIENT
Start: 2021-09-07 | End: 2021-10-29 | Stop reason: ALTCHOICE

## 2021-09-10 ENCOUNTER — VIRTUAL VISIT (OUTPATIENT)
Dept: FAMILY MEDICINE CLINIC | Age: 41
End: 2021-09-10
Payer: COMMERCIAL

## 2021-09-10 DIAGNOSIS — R09.81 SINUS CONGESTION: ICD-10-CM

## 2021-09-10 DIAGNOSIS — H66.009 ACUTE SUPPURATIVE OTITIS MEDIA WITHOUT SPONTANEOUS RUPTURE OF EAR DRUM, RECURRENCE NOT SPECIFIED, UNSPECIFIED LATERALITY: Primary | ICD-10-CM

## 2021-09-10 DIAGNOSIS — R05.9 COUGH: ICD-10-CM

## 2021-09-10 DIAGNOSIS — R09.82 PND (POST-NASAL DRIP): ICD-10-CM

## 2021-09-10 PROCEDURE — 99213 OFFICE O/P EST LOW 20 MIN: CPT | Performed by: PHYSICIAN ASSISTANT

## 2021-09-10 PROCEDURE — G8428 CUR MEDS NOT DOCUMENT: HCPCS | Performed by: PHYSICIAN ASSISTANT

## 2021-09-10 PROCEDURE — 4004F PT TOBACCO SCREEN RCVD TLK: CPT | Performed by: PHYSICIAN ASSISTANT

## 2021-09-10 PROCEDURE — G8417 CALC BMI ABV UP PARAM F/U: HCPCS | Performed by: PHYSICIAN ASSISTANT

## 2021-09-10 RX ORDER — AMOXICILLIN AND CLAVULANATE POTASSIUM 875; 125 MG/1; MG/1
1 TABLET, FILM COATED ORAL 2 TIMES DAILY
Qty: 14 TABLET | Refills: 0 | Status: SHIPPED | OUTPATIENT
Start: 2021-09-10 | End: 2021-09-17

## 2021-09-10 RX ORDER — GUAIFENESIN 600 MG/1
600 TABLET, EXTENDED RELEASE ORAL 2 TIMES DAILY
Qty: 30 TABLET | Refills: 0 | Status: SHIPPED | OUTPATIENT
Start: 2021-09-10 | End: 2021-09-25

## 2021-09-10 RX ORDER — FLUTICASONE PROPIONATE 50 MCG
2 SPRAY, SUSPENSION (ML) NASAL DAILY
Qty: 16 G | Refills: 0 | Status: SHIPPED | OUTPATIENT
Start: 2021-09-10 | End: 2021-10-04

## 2021-09-10 NOTE — PROGRESS NOTES
Loree Henry (:  1980) is a 35 y.o. female,Established patient, here for evaluation of the following chief complaint(s): No chief complaint on file. ASSESSMENT/PLAN:  1. Otitis Media   -Unfortunately, unable to perform complete physical exam due to virtual visit; based on patient's symptoms and previous reported ear infections, will treat empirically with Augmentin. Recommend ongoing supportive care strategies including fluids, rest, salt water gargles, deep breathing, temperature checks etc.  We will additionally send in intranasal steroid spray and Mucinex; educated patient on proper use of these medications, side effects etc.  Advised patient that if symptoms do not improve at completion of antibiotic or persistent at 1 week, reach out to discuss further etc.   -Although patient reports she believes that she does not have Covid, will order Covid test, patient states she is unable to make it to clinic today for swab due to work schedule; advised patient on other testing options including Monday drive-through option at hospital, urgent cares, at home testing, health department etc.  Patient states understanding and states she will get tested. -Advised patient to report to emergency department if she develops shortness of breath, worst headache of her life, chest pains etc.  -     COVID-19; Future  -     amoxicillin-clavulanate (AUGMENTIN) 875-125 MG per tablet; Take 1 tablet by mouth 2 times daily for 7 days, Disp-14 tablet, R-0Normal  -     fluticasone (FLONASE) 50 MCG/ACT nasal spray; 2 sprays by Each Nostril route daily, Disp-16 g, R-0Normal  -     guaiFENesin (MUCINEX) 600 MG extended release tablet; Take 1 tablet by mouth 2 times daily for 15 days, Disp-30 tablet, R-0Normal  2. PND (post-nasal drip)  -     COVID-19; Future  -     fluticasone (FLONASE) 50 MCG/ACT nasal spray; 2 sprays by Each Nostril route daily, Disp-16 g, R-0Normal  3.  Cough  -     COVID-19; Future  -     guaiFENesin (MUCINEX) 600 MG extended release tablet; Take 1 tablet by mouth 2 times daily for 15 days, Disp-30 tablet, R-0Normal  4. Sinus congestion  -     COVID-19; Future  -     fluticasone (FLONASE) 50 MCG/ACT nasal spray; 2 sprays by Each Nostril route daily, Disp-16 g, R-0Normal  -     guaiFENesin (MUCINEX) 600 MG extended release tablet; Take 1 tablet by mouth 2 times daily for 15 days, Disp-30 tablet, R-0Normal      Return if symptoms worsen or fail to improve, for Follow Up. SUBJECTIVE/OBJECTIVE:  KATIE Carlson is a pleasant 35 y.o. female presenting to clinic today for ear pains. Patient reports onset of bilateral sharp ear pains approximately 3 days ago; patient reports it \"feels like I am in a wind tunnel,\" reporting sensation of pressure and altered hearing. Patient additionally reports increase in sinus congestion and postnasal drip as well as feelings of cough and mild chest congestion; patient reports she has been taking Sudafed with minimal relief. Patient reports previous infection episodes in the past which have been successfully treated with antibiotic and steroid. Patient denies fever, shortness of breath, chest pain etc.  Patient states \"I know I don't have Covid. \"      Review of Systems    No flowsheet data found.      Physical Exam    [INSTRUCTIONS:  \"[x]\" Indicates a positive item  \"[]\" Indicates a negative item  -- DELETE ALL ITEMS NOT EXAMINED]    Constitutional: [x] Appears well-developed and well-nourished [x] No apparent distress      [] Abnormal -     Mental status: [x] Alert and awake  [x] Oriented to person/place/time [x] Able to follow commands    [] Abnormal -     Eyes:   EOM    [x]  Normal    [] Abnormal -   Sclera  [x]  Normal    [] Abnormal -          Discharge [x]  None visible   [] Abnormal -     HENT: [x] Normocephalic, atraumatic  [] Abnormal -   [x] Mouth/Throat: Mucous membranes are moist    External Ears [x] Normal  [] Abnormal -    Neck: [x] No visualized mass [] Abnormal -     Pulmonary/Chest: [x] Respiratory effort normal   [x] No visualized signs of difficulty breathing or respiratory distress        [] Abnormal -      Musculoskeletal:   [x] Normal gait with no signs of ataxia         [x] Normal range of motion of neck        [] Abnormal -     Neurological:        [x] No Facial Asymmetry (Cranial nerve 7 motor function) (limited exam due to video visit)          [x] No gaze palsy        [] Abnormal -          Skin:        [x] No significant exanthematous lesions or discoloration noted on facial skin         [] Abnormal -            Psychiatric:       [x] Normal Affect [] Abnormal -        [x] No Hallucinations    Other pertinent observable physical exam findings:-                Kwan Naylor, was evaluated through a synchronous (real-time) audio-video encounter. The patient (or guardian if applicable) is aware that this is a billable service. Verbal consent to proceed has been obtained within the past 12 months. The visit was conducted pursuant to the emergency declaration under the [address] [place] [address] authority and the [employer] General Act. Patient identification was verified, and a caregiver was present when appropriate. The patient was located in a state where the provider was credentialed to provide care. An electronic signature was used to authenticate this note.     --KY Camacho

## 2021-10-19 ENCOUNTER — NURSE ONLY (OUTPATIENT)
Dept: INTERNAL MEDICINE CLINIC | Age: 41
End: 2021-10-19

## 2021-10-19 ENCOUNTER — VIRTUAL VISIT (OUTPATIENT)
Dept: FAMILY MEDICINE CLINIC | Age: 41
End: 2021-10-19
Payer: COMMERCIAL

## 2021-10-19 DIAGNOSIS — R09.81 SINUS CONGESTION: ICD-10-CM

## 2021-10-19 DIAGNOSIS — R09.81 SINUS CONGESTION: Primary | ICD-10-CM

## 2021-10-19 DIAGNOSIS — J32.9 RECURRENT SINUSITIS: ICD-10-CM

## 2021-10-19 DIAGNOSIS — Z86.69 HISTORY OF CHOLESTEATOMA: ICD-10-CM

## 2021-10-19 PROCEDURE — G8484 FLU IMMUNIZE NO ADMIN: HCPCS | Performed by: PHYSICIAN ASSISTANT

## 2021-10-19 PROCEDURE — G8428 CUR MEDS NOT DOCUMENT: HCPCS | Performed by: PHYSICIAN ASSISTANT

## 2021-10-19 PROCEDURE — 4004F PT TOBACCO SCREEN RCVD TLK: CPT | Performed by: PHYSICIAN ASSISTANT

## 2021-10-19 PROCEDURE — G8417 CALC BMI ABV UP PARAM F/U: HCPCS | Performed by: PHYSICIAN ASSISTANT

## 2021-10-19 PROCEDURE — 99213 OFFICE O/P EST LOW 20 MIN: CPT | Performed by: PHYSICIAN ASSISTANT

## 2021-10-19 RX ORDER — GUAIFENESIN 600 MG/1
600 TABLET, EXTENDED RELEASE ORAL 2 TIMES DAILY
Qty: 30 TABLET | Refills: 0 | Status: SHIPPED | OUTPATIENT
Start: 2021-10-19 | End: 2021-10-29 | Stop reason: ALTCHOICE

## 2021-10-19 RX ORDER — AMOXICILLIN AND CLAVULANATE POTASSIUM 875; 125 MG/1; MG/1
1 TABLET, FILM COATED ORAL 2 TIMES DAILY
Qty: 14 TABLET | Refills: 0 | Status: SHIPPED | OUTPATIENT
Start: 2021-10-21 | End: 2021-10-28

## 2021-10-19 ASSESSMENT — ENCOUNTER SYMPTOMS
BLOOD IN STOOL: 0
WHEEZING: 0
SHORTNESS OF BREATH: 0
DIARRHEA: 0
EYE REDNESS: 0
NAUSEA: 0
EYE PAIN: 0
RHINORRHEA: 0
COUGH: 1
EYE DISCHARGE: 0
SORE THROAT: 0
SINUS PRESSURE: 1
CHEST TIGHTNESS: 0
SINUS PAIN: 1
BACK PAIN: 0
COLOR CHANGE: 0
VOMITING: 0
ABDOMINAL PAIN: 0
CONSTIPATION: 0
PHOTOPHOBIA: 0

## 2021-10-19 NOTE — PROGRESS NOTES
Jori Bolaños (:  1980) is a 35 y.o. female,Established patient, here for evaluation of the following chief complaint(s): No chief complaint on file. ASSESSMENT/PLAN:  1. Sinus congestion   -Will obtain Covid test; patient to obtain test tomorrow morning at [address] in [facility]; will send in prescription for Mucinex and advised patient to continue with as needed Sudafed, ibuprofen, Flonase for symptomatic relief; send in Augmentin with future fill date for patient to  if no improvement at 1 week of symptoms; discussed antibiotic stewardship and overuse of antibiotics contributing to recurrence of infection; will place referral for ENT for consideration of further evaluation/imaging due to recurrent sinusitis and history of cholesteatoma etc.   -Recommend isolation etc. until Covid test results; if Covid test does come back positive, can consider alternative treatment such as steroid etc.  -     COVID-19; Future  -     guaiFENesin (MUCINEX) 600 MG extended release tablet; Take 1 tablet by mouth 2 times daily for 15 days, Disp-30 tablet, R-0Normal  -     amoxicillin-clavulanate (AUGMENTIN) 875-125 MG per tablet; Take 1 tablet by mouth 2 times daily for 7 days, Disp-14 tablet, R-0Normal  -     Amb External Referral To ENT  2. Recurrent sinusitis  -     Amb External Referral To ENT  3. Hx of Cholesteatoma   -     Amb External Referral To ENT    Return in about 1 week (around 10/26/2021), or if symptoms worsen or fail to improve, for Follow Up. SUBJECTIVE/OBJECTIVE:  HPI  Jori Bolaños is a pleasant 35 y.o. female presenting to clinic today for URI symptoms. Sinus congestionpatient reports onset of symptoms approximately 5 days ago; reports \"it is same thing as last time. \"  Reports symptoms began with sinus congestion and pressure and has progressed with postnasal drip, cough, chest congestion etc.  Patient reports that cough is productive of mucus; patient reports that she experiences significant sinus pressure retro-orbitally; reports increased pain with leaning forward and certain head movements; patient experienced a similar episode approximately 6 weeks ago and was successfully treated with Augmentin; patient reports that she was completely fine for the past several weeks before recurrence; patient does report history of cholesteatoma removal at age 19 and has previously seen ENT. Patient denies any fever, loss of taste or smell, shortness of breath, chest pain etc.        Review of Systems   Constitutional: Negative for appetite change, chills, fatigue and fever. HENT: Positive for congestion, postnasal drip, sinus pressure and sinus pain. Negative for ear pain, hearing loss, rhinorrhea and sore throat. Eyes: Negative for photophobia, pain, discharge and redness. Respiratory: Positive for cough. Negative for chest tightness, shortness of breath and wheezing. Cardiovascular: Negative for chest pain, palpitations and leg swelling. Gastrointestinal: Negative for abdominal pain, blood in stool, constipation, diarrhea, nausea and vomiting. Endocrine: Negative for polyuria. Genitourinary: Negative for difficulty urinating, dysuria, flank pain, frequency, hematuria and urgency. Musculoskeletal: Negative for arthralgias, back pain, gait problem and joint swelling. Skin: Negative for color change and rash. Neurological: Negative for dizziness, syncope, weakness, light-headedness and headaches. Hematological: Negative for adenopathy. Psychiatric/Behavioral: Negative for agitation, behavioral problems and suicidal ideas. The patient is not nervous/anxious. No flowsheet data found.      Physical Exam    [INSTRUCTIONS:  \"[x]\" Indicates a positive item  \"[]\" Indicates a negative item  -- DELETE ALL ITEMS NOT EXAMINED]    Constitutional: [x] Appears well-developed and well-nourished [x] No apparent distress      [] Abnormal -     Mental status: [x] Alert and awake  [x] Oriented to person/place/time [x] Able to follow commands    [] Abnormal -     Eyes:   EOM    [x]  Normal    [] Abnormal -   Sclera  [x]  Normal    [] Abnormal -          Discharge [x]  None visible   [] Abnormal -     HENT: [x] Normocephalic, atraumatic  [] Abnormal -   [x] Mouth/Throat: Mucous membranes are moist    External Ears [x] Normal  [] Abnormal -    Neck: [x] No visualized mass [] Abnormal -     Pulmonary/Chest: [x] Respiratory effort normal   [x] No visualized signs of difficulty breathing or respiratory distress        [] Abnormal -      Musculoskeletal:   [x] Normal gait with no signs of ataxia         [x] Normal range of motion of neck        [] Abnormal -     Neurological:        [x] No Facial Asymmetry (Cranial nerve 7 motor function) (limited exam due to video visit)          [x] No gaze palsy        [] Abnormal -          Skin:        [x] No significant exanthematous lesions or discoloration noted on facial skin         [] Abnormal -            Psychiatric:       [x] Normal Affect [] Abnormal -        [x] No Hallucinations    Other pertinent observable physical exam findings:-                Lashmuel Pearson, was evaluated through a synchronous (real-time) audio-video encounter. The patient (or guardian if applicable) is aware that this is a billable service. Verbal consent to proceed has been obtained within the past 12 months. The visit was conducted pursuant to the emergency declaration under the [facility], [address] [place] [address] authority and the [employer] General Act. Patient identification was verified, and a caregiver was present when appropriate. The patient was located in a state where the provider was credentialed to provide care. An electronic signature was used to authenticate this note.     --KY Vincent

## 2021-10-20 DIAGNOSIS — R09.81 SINUS CONGESTION: ICD-10-CM

## 2021-10-20 DIAGNOSIS — U07.1 COVID-19: ICD-10-CM

## 2021-10-20 DIAGNOSIS — R09.81 SINUS CONGESTION: Primary | ICD-10-CM

## 2021-10-20 LAB — SARS-COV-2: DETECTED

## 2021-10-20 RX ORDER — AZITHROMYCIN 250 MG/1
250 TABLET, FILM COATED ORAL SEE ADMIN INSTRUCTIONS
Qty: 6 TABLET | Refills: 0 | Status: SHIPPED | OUTPATIENT
Start: 2021-10-20 | End: 2021-10-20 | Stop reason: SDUPTHER

## 2021-10-20 RX ORDER — METHYLPREDNISOLONE 4 MG/1
TABLET ORAL
Qty: 1 KIT | Refills: 0 | Status: SHIPPED | OUTPATIENT
Start: 2021-10-20 | End: 2021-10-26

## 2021-10-20 RX ORDER — AZITHROMYCIN 250 MG/1
250 TABLET, FILM COATED ORAL SEE ADMIN INSTRUCTIONS
Qty: 6 TABLET | Refills: 0 | Status: SHIPPED | OUTPATIENT
Start: 2021-10-20 | End: 2021-10-25

## 2021-10-20 NOTE — RESULT ENCOUNTER NOTE
Please call pt to inform of positive COVID test result; cancel rx for augmentin that was sent in; sent in rx for steroid and zpac; based on severity, will hold off on antibody infusion for now based on availability; if significant worsening develops in next few days can order infusion. Continue Mucinex and other supportive care as discussed; can  iodine solution at pharmacy at dilute 10:1 and apply drops in nose and gargle which can decrease viral replication etc. Can take vitamin C 1000 mg daily, Zinc 50 mg daily, continue with vitamin d supplement.  Can take aspirin 81 mg daily to prevent risk of clots if no history if GI bleed etc.     Recommend isolation until 10 day post symptom onset and fever free and improving etc.

## 2021-10-26 ENCOUNTER — TELEPHONE (OUTPATIENT)
Dept: FAMILY MEDICINE CLINIC | Age: 41
End: 2021-10-26

## 2021-10-26 DIAGNOSIS — R05.9 COUGH: Primary | ICD-10-CM

## 2021-10-26 RX ORDER — BENZONATATE 100 MG/1
100-200 CAPSULE ORAL 3 TIMES DAILY PRN
Qty: 60 CAPSULE | Refills: 0 | Status: SHIPPED | OUTPATIENT
Start: 2021-10-26 | End: 2021-10-29 | Stop reason: ALTCHOICE

## 2021-10-26 NOTE — LETTER
[facility] AT [facility] & JEAN CLAUDE [facility] [address] [place] [address]  Phone: [phone]  Fax: [place], [place]        October 26, 2021     Patient: Satish Cruz   YOB: 1980   Date of Visit: 10/26/2021       To Whom It May Concern: It is my medical opinion that Satish Cruz should remain out of work until Monday 11/1/2021 if symptoms are improving, is fever free for 24 hours, and no shortness of breath etc.    If you have any questions or concerns, please don't hesitate to call.     Sincerely,        Bairon Alatorre PA-C

## 2021-10-27 NOTE — TELEPHONE ENCOUNTER
Spoke to patient she stated the [employer] are not helping she had picked up amoxicillin was not sure why she should be taking the amoxicillin I spoke with Pricilla Green advised the tessalon is not helping and was unsure why she would be taking it.  Pricilla Green advised that she not start the amoxicillin and he will call in a different cough suppressant advised if she is feeling she is getting worse to go the the ER patient verbalized understanding

## 2021-10-29 ENCOUNTER — APPOINTMENT (OUTPATIENT)
Dept: GENERAL RADIOLOGY | Age: 41
End: 2021-10-29
Payer: COMMERCIAL

## 2021-10-29 ENCOUNTER — HOSPITAL ENCOUNTER (EMERGENCY)
Age: 41
Discharge: HOME | End: 2021-10-29
Attending: EMERGENCY MEDICINE
Payer: COMMERCIAL

## 2021-10-29 VITALS
OXYGEN SATURATION: 96 % | TEMPERATURE: 98.3 F | RESPIRATION RATE: 19 BRPM | BODY MASS INDEX: 34.49 KG/M2 | DIASTOLIC BLOOD PRESSURE: 79 MMHG | HEIGHT: 65 IN | SYSTOLIC BLOOD PRESSURE: 118 MMHG | WEIGHT: 207 LBS | HEART RATE: 81 BPM

## 2021-10-29 DIAGNOSIS — H71.91 CHOLESTEATOMA OF RIGHT EAR: ICD-10-CM

## 2021-10-29 DIAGNOSIS — J06.9 VIRAL URI WITH COUGH: ICD-10-CM

## 2021-10-29 DIAGNOSIS — U07.1 COVID-19: Primary | ICD-10-CM

## 2021-10-29 DIAGNOSIS — M79.10 MYALGIA: ICD-10-CM

## 2021-10-29 PROCEDURE — 6360000002 HC RX W HCPCS: Performed by: EMERGENCY MEDICINE

## 2021-10-29 PROCEDURE — 99283 EMERGENCY DEPT VISIT LOW MDM: CPT

## 2021-10-29 PROCEDURE — 96372 THER/PROPH/DIAG INJ SC/IM: CPT

## 2021-10-29 PROCEDURE — 71045 X-RAY EXAM CHEST 1 VIEW: CPT

## 2021-10-29 RX ORDER — KETOROLAC TROMETHAMINE 30 MG/ML
30 INJECTION, SOLUTION INTRAMUSCULAR; INTRAVENOUS ONCE
Status: COMPLETED | OUTPATIENT
Start: 2021-10-29 | End: 2021-10-29

## 2021-10-29 RX ORDER — ACETAMINOPHEN 325 MG/1
650 TABLET ORAL EVERY 6 HOURS PRN
Qty: 30 TABLET | Refills: 0 | Status: SHIPPED | OUTPATIENT
Start: 2021-10-29 | End: 2022-10-10

## 2021-10-29 RX ORDER — ALBUTEROL SULFATE 90 UG/1
2 AEROSOL, METERED RESPIRATORY (INHALATION) 4 TIMES DAILY PRN
Qty: 18 G | Refills: 0 | Status: SHIPPED | OUTPATIENT
Start: 2021-10-29

## 2021-10-29 RX ORDER — DEXTROMETHORPHAN HYDROBROMIDE AND PROMETHAZINE HYDROCHLORIDE 15; 6.25 MG/5ML; MG/5ML
5 SYRUP ORAL 4 TIMES DAILY PRN
Qty: 120 ML | Refills: 0 | Status: SHIPPED | OUTPATIENT
Start: 2021-10-29 | End: 2021-10-29 | Stop reason: ALTCHOICE

## 2021-10-29 RX ORDER — IBUPROFEN 600 MG/1
600 TABLET ORAL 3 TIMES DAILY PRN
Qty: 30 TABLET | Refills: 0 | Status: SHIPPED | OUTPATIENT
Start: 2021-10-29 | End: 2022-02-14 | Stop reason: SDUPTHER

## 2021-10-29 RX ORDER — DEXTROMETHORPHAN HYDROBROMIDE AND PROMETHAZINE HYDROCHLORIDE 15; 6.25 MG/5ML; MG/5ML
5 SYRUP ORAL 3 TIMES DAILY PRN
Qty: 80 ML | Refills: 0 | Status: SHIPPED | OUTPATIENT
Start: 2021-10-29 | End: 2021-11-05

## 2021-10-29 RX ORDER — GUAIFENESIN 600 MG/1
1200 TABLET, EXTENDED RELEASE ORAL 2 TIMES DAILY
Qty: 40 TABLET | Refills: 0 | Status: SHIPPED | OUTPATIENT
Start: 2021-10-29 | End: 2021-11-08

## 2021-10-29 RX ORDER — ORPHENADRINE CITRATE 30 MG/ML
60 INJECTION INTRAMUSCULAR; INTRAVENOUS ONCE
Status: COMPLETED | OUTPATIENT
Start: 2021-10-29 | End: 2021-10-29

## 2021-10-29 RX ORDER — ECHINACEA PURPUREA EXTRACT 125 MG
1 TABLET ORAL PRN
Qty: 1 EACH | Refills: 3 | Status: SHIPPED | OUTPATIENT
Start: 2021-10-29 | End: 2022-03-03 | Stop reason: ALTCHOICE

## 2021-10-29 RX ADMIN — ORPHENADRINE CITRATE 60 MG: 60 INJECTION INTRAMUSCULAR; INTRAVENOUS at 11:54

## 2021-10-29 RX ADMIN — KETOROLAC TROMETHAMINE 30 MG: 30 INJECTION, SOLUTION INTRAMUSCULAR at 11:54

## 2021-10-29 ASSESSMENT — PAIN SCALES - GENERAL
PAINLEVEL_OUTOF10: 6
PAINLEVEL_OUTOF10: 8

## 2021-10-29 ASSESSMENT — PAIN DESCRIPTION - DURATION: DURATION_2: CONTINUOUS

## 2021-10-29 ASSESSMENT — PAIN DESCRIPTION - DESCRIPTORS
DESCRIPTORS: BURNING
DESCRIPTORS_2: PRESSURE

## 2021-10-29 ASSESSMENT — PAIN DESCRIPTION - PAIN TYPE: TYPE: ACUTE PAIN

## 2021-10-29 ASSESSMENT — PAIN DESCRIPTION - LOCATION
LOCATION: CHEST
LOCATION_2: EAR

## 2021-10-29 ASSESSMENT — PAIN DESCRIPTION - FREQUENCY: FREQUENCY: CONTINUOUS

## 2021-10-29 ASSESSMENT — PAIN DESCRIPTION - ORIENTATION: ORIENTATION_2: LEFT

## 2021-10-29 ASSESSMENT — PAIN DESCRIPTION - INTENSITY: RATING_2: 7

## 2021-10-29 NOTE — TELEPHONE ENCOUNTER
Pt called and stated that she called on 10/27/2021 about her cough and her pcp stated that he was going to send over cough medication, and it never got sent over. Pt is frustrated because she stated she was advised that it was being sent over. Please advise.

## 2021-10-29 NOTE — ED NOTES
Discharge instructions reviewed with patient. Reviewed prescriptions with patient. No additional questions asked. Voiced understanding. Encouraged patient to follow up as discussed by the ED physician.      Caitlin Vick RN  10/29/21 0283

## 2021-10-29 NOTE — ED PROVIDER NOTES
CHIEF COMPLAINT  Chief Complaint   Patient presents with    Positive For Covid-19     States dx with Covid alst Wednesday. States began feeling ill last Saturday, but states she has really been sick for a month. States is having hot and cold flashes. Denies nausea or vomiting. Denies fever. States is coughing up mucous, but has not looked at it.  Shortness of Breath    Cough       KATIE May is a 35 y.o. female with history of relative previous health who presents with feeling unwell over the past month including nasal congestion, headache, chest discomfort, cough, myalgias, fatigue and feverishness. 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 She is not vaccinated for Covid, initially she was treated with a Z-Clement and steroids for bronchitis but when she did not improve she was tested for Covid and tested positive on Wednesday. She has already felt unwell since Saturday, she was outside of the window for infusion. Her father recently tested positive for Covid as well. She denies any underlying history of cardiac or lung disease, any history of blood clots. She has been taking Tessalon Perles with minimal improvement of the cough. The cough is rated as moderate to severe, intermittently productive and persistent until time of evaluation here. She denies any chance of pregnancy, she is currently on her menstrual cycle.     REVIEW OF SYSTEMS  Review of Systems   History obtained from chart review and the patient  General ROS: positive for  - chills, fatigue and malaise  Ophthalmic ROS: negative for - decreased vision or double vision  ENT ROS: positive for - nasal congestion, nasal discharge, sinus pain and sore throat  Hematological and Lymphatic ROS: negative for - bleeding problems  Endocrine ROS: negative for - unexpected weight changes  Respiratory ROS: positive for - cough, shortness of breath and sputum changes  Cardiovascular ROS: positive for - dyspnea on exertion  Gastrointestinal ROS: negative for - abdominal pain, positive for diarrhea (nonbloody)  Genito-Urinary ROS: no dysuria, trouble voiding, or hematuria  Musculoskeletal ROS: positive for -myalgias  Neurological ROS: no TIA or stroke symptoms      PAST MEDICAL HISTORY  Past Medical History:   Diagnosis Date    Lumbar sprain        FAMILY HISTORY  History reviewed. No pertinent family history. SOCIAL HISTORY  Social History     Socioeconomic History    Marital status:      Spouse name: None    Number of children: None    Years of education: None    Highest education level: None   Occupational History    None   Tobacco Use    Smoking status: Former Smoker     Packs/day: 0.50     Years: 24.00     Pack years: 12.00     Quit date: 10/20/2021     Years since quittin.0    Smokeless tobacco: Never Used   Vaping Use    Vaping Use: Never used   Substance and Sexual Activity    Alcohol use: Yes     Comment: Rare    Drug use: Never    Sexual activity: None   Other Topics Concern    None   Social History Narrative    None     Social Determinants of Health     Financial Resource Strain:     Difficulty of Paying Living Expenses:    Food Insecurity:     Worried About Running Out of Food in the Last Year:     Ran Out of Food in the Last Year:    Transportation Needs:     Lack of Transportation (Medical):      Lack of Transportation (Non-Medical):    Physical Activity:     Days of Exercise per Week:     Minutes of Exercise per Session:    Stress:     Feeling of Stress :    Social Connections:     Frequency of Communication with Friends and Family:     Frequency of Social Gatherings with Friends and Family:     Attends Evangelical Services:     Active Member of Clubs or Organizations:     Attends Club or Organization Meetings:     Marital Status:    Intimate Partner Violence:     Fear of Current or Ex-Partner:     Emotionally Abused:     Physically Abused:  Sexually Abused:        SURGICAL HISTORY  Past Surgical History:   Procedure Laterality Date    ABDOMINAL EXPLORATION SURGERY      BUNIONECTOMY Right     EAR SURGERY         CURRENT MEDICATIONS  No current facility-administered medications on file prior to encounter. Current Outpatient Medications on File Prior to Encounter   Medication Sig Dispense Refill    butalbital-acetaminophen-caffeine (FIORICET, ESGIC) -40 MG per tablet Take 1-2 tablets by mouth every 4 hours as needed for Headaches 20 tablet 2         ALLERGIES  No Known Allergies    PHYSICAL EXAM  VITAL SIGNS: /79   Pulse 81   Temp 98.3 °F (36.8 °C) (Oral)   Resp 19   Ht 5' 5\" (1.651 m)   Wt 207 lb (93.9 kg)   LMP 10/27/2021   SpO2 96%   BMI 34.45 kg/m²   Constitutional: Well developed, Well nourished, resting in bed, appears to feel unwell  HENT: Normocephalic, Atraumatic, Bilateral external ears normal, Oropharynx moist, No oral exudates, Nose congested. Right TM obscured by cholesteatoma, left TM within normal limits  Eyes: PERRL, EOMI, Conjunctiva normal, No discharge. Neck: Normal range of motion, Supple, No stridor. Cardiovascular: Normal heart rate, Normal rhythm, No murmurs, No rubs, No gallops. Thorax & Lungs: Normal breath sounds, No respiratory distress, No wheezing, No chest tenderness. Abdomen: Bowel sounds normal, Soft, No tenderness, no guarding, no rebound, No masses, No pulsatile masses. Skin: Warm, Dry, No erythema, No rash. Extremities: Intact distal pulses, No edema, No tenderness, No cyanosis, No clubbing. Musculoskeletal: Good gross range of motion in all major joints. No major deformities noted. Neurologic: Alert & oriented x 3, Normal gross motor function, Normal gross sensory function, No focal deficits noted. Psychiatric: Affect flat        RADIOLOGY/PROCEDURES/LABS  Last Imaging results   XR CHEST PORTABLE   Preliminary Result   No acute cardiopulmonary process. Imaging reviewed by myself        Medications   ketorolac (TORADOL) injection 30 mg (30 mg IntraMUSCular Given 10/29/21 1154)   orphenadrine (NORFLEX) injection 60 mg (60 mg IntraMUSCular Given 10/29/21 1154)       COURSE & MEDICAL DECISION MAKING  Pertinent Labs & Imaging studies reviewed. (See chart for details)    22-year-old female presents with myalgias, ear fullness, sore throat, cough, headaches and fatigue. She was recently diagnosed with Covid, signs and symptoms likely secondary to viral syndrome associated. Chest x-ray without demonstrated pneumonia, patient appears well-hydrated, active and hemodynamically stable on room air. She was treated symptomatically in the department with Toradol and Norflex with some clinical improvement. There is no signs of otitis media or externa, [place] or mastoiditis. There is no signs of PTA, RPA, strep pharyngitis. She does have a recurrent right cholesteatoma, should referred to ENT for follow-up of this. She is agreeable with this plan of care. She will be continued on supportive care and referred back to primary care. Strict return precautions put in to place. Discharged in hemodynamically stable condition. FINAL IMPRESSION  Problem List Items Addressed This Visit     None      Visit Diagnoses     COVID-19    -  Primary    Myalgia        Viral URI with cough        Cholesteatoma of right ear          1.    2.   3.    Patient gave me permission to discuss medical history, care, and plan with those present in the room.   Electronically signed by: Fer Hanson MD, 10/29/2021  MD Fer Perez MD  10/29/21 8800

## 2021-10-29 NOTE — TELEPHONE ENCOUNTER
Let pt know Farida Cantu out of office but I did send in different cough med for her after reviewing chart

## 2021-11-01 ENCOUNTER — TELEPHONE (OUTPATIENT)
Dept: FAMILY MEDICINE CLINIC | Age: 41
End: 2021-11-01

## 2021-11-01 ENCOUNTER — CARE COORDINATION (OUTPATIENT)
Dept: CARE COORDINATION | Age: 41
End: 2021-11-01

## 2021-11-01 DIAGNOSIS — H66.009 ACUTE SUPPURATIVE OTITIS MEDIA WITHOUT SPONTANEOUS RUPTURE OF EAR DRUM, RECURRENCE NOT SPECIFIED, UNSPECIFIED LATERALITY: Primary | ICD-10-CM

## 2021-11-01 NOTE — TELEPHONE ENCOUNTER
Referral was completed to the ENT's at [facility] called and lm on MA vm-the information needed

## 2021-11-01 NOTE — TELEPHONE ENCOUNTER
Héctor Loya from [facility] care called and patient is complaining of Fullness in both ears. Patient was seen in ED over the weekend and tested + for Chen Gonzales on 10/29/2021    While she was there, the ED noted a cyst in her right ear. ED referred her to ENT in [facility], but patient would like a referral to a ENT in [place].  Are we able to change referral or do you need a telehealth with patient to be able to medley it

## 2021-11-01 NOTE — CARE COORDINATION
[facility] ED Follow Up Call    2021    Patient: Jori Bolaños Patient : 1980   MRN: <[id number]>  Reason for Admission:   COVID+  Discharge Date: 10/29/21    Patient contacted regarding COVID-19 diagnosis. Discussed COVID-19 related testing which was available at this time. Test results were positive. Patient informed of results, if available? Yes. Ambulatory Care Manager contacted the patient by telephone to perform post discharge assessment. Call within 2 business days of discharge: Yes. Verified name and  with patient as identifiers. Provided introduction to self, and explanation of the ACM role, and reason for call due to risk factors for infection and/or exposure to COVID-19. Symptoms reviewed with patient who verbalized the following symptoms: fatigue; fullness in right ear, cough. Denies worsening symptoms. Encouraged rest/ hydration. Reviewed COVID-19 zone management tool and s/s to report to MD.       Due to new onset of symptoms encounter was routed to provider for escalation. Discussed follow-up appointments. If no appointment was previously scheduled, appointment scheduling offered: Yes. [facility] follow up appointment(s): No future appointments. Non-[facility] follow up appointment(s):     Non-face-to-face services provided:  Scheduled appointment with PCP-1  Education of patient/family/caregiver/guardian to support self-management-1     Advance Care Planning:   Does patient have an Advance Directive:  not on file. Educated patient about risk for severe COVID-19 due to risk factors according to CDC guidelines. ACM reviewed discharge instructions, medical action plan and red flag symptoms with the patient who verbalized understanding. Discussed COVID vaccination status: No. Education provided on COVID-19 vaccination as appropriate. Discussed exposure protocols and quarantine with CDC Guidelines.  Patient was given an opportunity to verbalize any questions and concerns and agrees to contact ACM or health care provider for questions related to their healthcare. Reviewed and educated patient on any new and changed medications related to discharge diagnosis    Was patient discharged with a pulse oximeter? No. Discussed and confirmed pulse oximeter discharge instructions and when to notify provider or seek emergency care. ACM provided contact information. Plan for follow-up call in 5-7 days based on severity of symptoms and risk factors. Spoke with PCP office to update in r/t patient symptoms; request follow up appt. / report patient request for referral for ENT in [place]. Office to contact Provider and follow up with patient. ACM contact information provided for any questions. Follow up with patient to confirm plan as above.          Care Transitions ED Follow Up    Care Transitions Interventions

## 2021-11-02 ENCOUNTER — TELEPHONE (OUTPATIENT)
Dept: FAMILY MEDICINE CLINIC | Age: 41
End: 2021-11-02

## 2021-11-02 DIAGNOSIS — R09.82 PND (POST-NASAL DRIP): Primary | ICD-10-CM

## 2021-11-02 DIAGNOSIS — R09.81 SINUS CONGESTION: ICD-10-CM

## 2021-11-02 RX ORDER — FLUTICASONE PROPIONATE 50 MCG
2 SPRAY, SUSPENSION (ML) NASAL DAILY
Qty: 48 G | Refills: 1 | Status: SHIPPED | OUTPATIENT
Start: 2021-11-02 | End: 2022-10-10 | Stop reason: ALTCHOICE

## 2021-11-02 NOTE — TELEPHONE ENCOUNTER
Sent in [place] for patient to trial; 1 spray in each nostril twice a day until evaluated by ENT specialist.

## 2021-11-02 NOTE — TELEPHONE ENCOUNTER
Spoke with pt and she advised that she has an appt with ENT on Friday. States that she feels like she is in a tunnel and reports that her co-workers keep telling her that she is talking loud. Has been on ATB. Wanting to know what else she can do or take in the meantime. Please advise.

## 2021-11-08 ENCOUNTER — CARE COORDINATION (OUTPATIENT)
Dept: CARE COORDINATION | Age: 41
End: 2021-11-08

## 2021-11-08 DIAGNOSIS — G89.29 CHRONIC NONINTRACTABLE HEADACHE, UNSPECIFIED HEADACHE TYPE: ICD-10-CM

## 2021-11-08 DIAGNOSIS — R51.9 CHRONIC NONINTRACTABLE HEADACHE, UNSPECIFIED HEADACHE TYPE: ICD-10-CM

## 2021-11-08 RX ORDER — BUTALBITAL, ACETAMINOPHEN AND CAFFEINE 50; 325; 40 MG/1; MG/1; MG/1
1 TABLET ORAL EVERY 4 HOURS PRN
Qty: 20 TABLET | Refills: 1 | Status: SHIPPED | OUTPATIENT
Start: 2021-11-08 | End: 2021-12-22 | Stop reason: SDUPTHER

## 2021-11-08 NOTE — CARE COORDINATION
[facility] ED Follow Up Call    2021    Patient: Jo Ann Singh Patient : 1980   MRN: <[id number]>  Reason for Admission: COVID-19+  Discharge date; 10/29/21   Patient resolved from the Care Transitions episode on 21. Patient/family has been provided the following resources and education related to COVID-19:                         Signs, symptoms and red flags related to COVID-19            CDC exposure and quarantine guidelines            [facility] exposure contact - [phone]            Contact for their local Department of Health                 Patient currently reports that the following symptoms have improved:  Reports that she is feeling better every day. Reports HA, fatigue. Encouraged rest/ hydration. Instructed on worsening COVID s/s to report to MD.      Patient declined offer for PCP follow up appointment. No further outreach scheduled with this ACM. Episode of Care resolved. Patient has this ACM contact information if future needs arise.          Care Transitions ED Follow Up    Care Transitions Interventions

## 2021-11-23 ENCOUNTER — TELEPHONE (OUTPATIENT)
Dept: FAMILY MEDICINE CLINIC | Age: 41
End: 2021-11-23

## 2021-11-23 DIAGNOSIS — R51.9 CHRONIC NONINTRACTABLE HEADACHE, UNSPECIFIED HEADACHE TYPE: Primary | ICD-10-CM

## 2021-11-23 DIAGNOSIS — G89.29 CHRONIC NONINTRACTABLE HEADACHE, UNSPECIFIED HEADACHE TYPE: Primary | ICD-10-CM

## 2021-11-23 DIAGNOSIS — F41.9 ANXIETY: ICD-10-CM

## 2021-11-23 RX ORDER — RIZATRIPTAN BENZOATE 5 MG/1
5 TABLET ORAL
Qty: 10 TABLET | Refills: 3 | Status: SHIPPED | OUTPATIENT
Start: 2021-11-23 | End: 2022-03-03 | Stop reason: ALTCHOICE

## 2021-11-23 RX ORDER — VENLAFAXINE HYDROCHLORIDE 75 MG/1
CAPSULE, EXTENDED RELEASE ORAL
Qty: 90 CAPSULE | Refills: 1 | OUTPATIENT
Start: 2021-11-23

## 2021-11-23 NOTE — TELEPHONE ENCOUNTER
Pt was previously on maxalt which she previously reported was beneficial; looks like she is currently only taking fiorect, sent in new rx for maxalt for pt to retrial for headache relief; recommend getting in with ENT and neuro as well

## 2021-11-23 NOTE — TELEPHONE ENCOUNTER
Please call pt back to advise; no clear guidelines or evidence for post COVID headaches etc; sometimes post covid symptoms can last for several months; time is likely to be best treatment etc; if any other new neurologic symptoms are present, we can consider imaging; referral placed for neurologist; pt has been referred for ENT as well previously, would recommend pt schedule an appointment for them as well due to recurrent ear issues and possibility this may be contributing to headaches etc. Pt can schedule visit to discuss if concerned etc.

## 2021-11-23 NOTE — TELEPHONE ENCOUNTER
Pt called in with complaints of migraines with no relief ever since pt had Covid current medication for migraine is not helping pt was asking for something else had seen eye doctor due to pressure behind the eyes eyes were fine.  Please advise

## 2021-11-23 NOTE — TELEPHONE ENCOUNTER
Looks like this was self discontinued; ER note on 10/29 indicates pt no longer taking; please call pt to see if they are still taking this?  Abrupt discontinuation may be contributory to pt's headaches; can send in refill if pt taking or would like to reinitiate etc

## 2021-11-24 RX ORDER — VENLAFAXINE HYDROCHLORIDE 75 MG/1
75 CAPSULE, EXTENDED RELEASE ORAL DAILY
Qty: 90 CAPSULE | Refills: 1 | Status: SHIPPED | OUTPATIENT
Start: 2021-11-24 | End: 2022-06-15

## 2021-11-24 NOTE — TELEPHONE ENCOUNTER
Refilled venlafaxine; maxalt sent in yesterday for headache; pt can schedule visit to discuss.  Has been referred to ENT and neuro

## 2021-11-24 NOTE — TELEPHONE ENCOUNTER
Called pt and she advised she is still taking the medication, and picked up a refill yesterday from the pharmacy. Pt advised it is still not working with the headaches though.

## 2021-12-07 ENCOUNTER — OFFICE VISIT (OUTPATIENT)
Dept: NEUROLOGY | Age: 41
End: 2021-12-07
Payer: COMMERCIAL

## 2021-12-07 VITALS
WEIGHT: 220.4 LBS | BODY MASS INDEX: 36.72 KG/M2 | DIASTOLIC BLOOD PRESSURE: 84 MMHG | HEART RATE: 80 BPM | SYSTOLIC BLOOD PRESSURE: 112 MMHG | OXYGEN SATURATION: 98 % | HEIGHT: 65 IN

## 2021-12-07 DIAGNOSIS — Z82.49 FAMILY HISTORY OF CEREBRAL ANEURYSM: ICD-10-CM

## 2021-12-07 DIAGNOSIS — M54.81 BILATERAL OCCIPITAL NEURALGIA: ICD-10-CM

## 2021-12-07 DIAGNOSIS — G43.709 CHRONIC MIGRAINE WITHOUT AURA WITHOUT STATUS MIGRAINOSUS, NOT INTRACTABLE: Primary | ICD-10-CM

## 2021-12-07 PROCEDURE — G8417 CALC BMI ABV UP PARAM F/U: HCPCS | Performed by: STUDENT IN AN ORGANIZED HEALTH CARE EDUCATION/TRAINING PROGRAM

## 2021-12-07 PROCEDURE — G8427 DOCREV CUR MEDS BY ELIG CLIN: HCPCS | Performed by: STUDENT IN AN ORGANIZED HEALTH CARE EDUCATION/TRAINING PROGRAM

## 2021-12-07 PROCEDURE — 99245 OFF/OP CONSLTJ NEW/EST HI 55: CPT | Performed by: STUDENT IN AN ORGANIZED HEALTH CARE EDUCATION/TRAINING PROGRAM

## 2021-12-07 PROCEDURE — G8484 FLU IMMUNIZE NO ADMIN: HCPCS | Performed by: STUDENT IN AN ORGANIZED HEALTH CARE EDUCATION/TRAINING PROGRAM

## 2021-12-07 RX ORDER — FAMOTIDINE 20 MG/1
20 TABLET, FILM COATED ORAL 2 TIMES DAILY
Qty: 14 TABLET | Refills: 0 | Status: SHIPPED | OUTPATIENT
Start: 2021-12-07 | End: 2022-10-10 | Stop reason: ALTCHOICE

## 2021-12-07 RX ORDER — TIZANIDINE 4 MG/1
4 TABLET ORAL NIGHTLY
Qty: 30 TABLET | Refills: 2 | Status: SHIPPED | OUTPATIENT
Start: 2021-12-07 | End: 2022-03-03 | Stop reason: ALTCHOICE

## 2021-12-07 RX ORDER — INDOMETHACIN 25 MG/1
50 CAPSULE ORAL 2 TIMES DAILY
Qty: 28 CAPSULE | Refills: 0 | Status: SHIPPED | OUTPATIENT
Start: 2021-12-07 | End: 2022-03-03 | Stop reason: ALTCHOICE

## 2021-12-07 NOTE — PROGRESS NOTES
12/7/21    Veta Osler  1980    Chief Complaint   Patient presents with    New Patient     migraine x3 months now no meds are touching it. No imaging. Eye Dr said pressure in eyes are okay. Neurologic Consult Note    Subjective    History of Present Illness  Flaco Hay is a 35 y.o. female presenting today for neurologic evaluation of  Headache. Flaco Hay has past medical history of migraines since she was 10years old. She tells me that she typically would have migraines 1-2 times per week. These typically had aborted with Fioricet. Since September 2021 she has had a migraine that she has been unable to break. She denies any provoking events at that time. She denies being in car accident, changing pillows or bedding. Denies new stressors. She describes the headache as bilateral and occipitally located. She states the pain does go down into her neck. She does have associated photophobia/phonophobia. She admits to nausea and emesis. She reports significant allodynia over her scalp. Her headaches preceding this bad cycle always lasted greater than 4 hours and would often last the entire day. No aura with her headache. He is currently impacted by headache 38 to 30 days, all of which are migraine. She also reports significant tenderness to the occipital notch bilaterally. She has never been on blood pressure medications. She has no family history of migraines. Triggers:  Prior Preventative medications tried: topamax (nausea), amitriptyline, Effexor  Current preventative: Effexor  Prior abortive medications tried: Fioricet, Imitrex (side effects), Maxalt (not efficacious)  Current abortive: Fioricet    She also tells me that she has strong family history of cerebral and aneurysm. Both her mother and her maternal grandmother had cerebral aneurysms. She tells me that her grandmother actually passed away from a ruptured cerebral aneurysm.     Review of Symptoms:  Neurologic   Symptoms: no difficulty with gait or walking, no bowel symptoms, no vertigo, no confusion, no memory loss, no speech disorder, no visual loss, no double vision, no dizziness, no loss of hearing, no sensory disturbances, no weakness, +headaches, no bladder symptoms, no seizures, no excessive fatigue and no syncope    Current Outpatient Medications   Medication Sig Dispense Refill    tiZANidine (ZANAFLEX) 4 MG tablet Take 1 tablet by mouth nightly 30 tablet 2    indomethacin (INDOCIN) 25 MG capsule Take 2 capsules by mouth 2 times daily for 7 days 28 capsule 0    famotidine (PEPCID) 20 MG tablet Take 1 tablet by mouth 2 times daily for 7 days 14 tablet 0    venlafaxine (EFFEXOR XR) 75 MG extended release capsule Take 1 capsule by mouth daily 90 capsule 1    butalbital-acetaminophen-caffeine (FIORICET, ESGIC) -40 MG per tablet Take 1 tablet by mouth every 4 hours as needed for Headaches or Migraine 20 tablet 1    fluticasone (FLONASE) 50 MCG/ACT nasal spray 2 sprays by Each Nostril route daily 48 g 1    albuterol sulfate HFA (VENTOLIN HFA) 108 (90 Base) MCG/ACT inhaler Inhale 2 puffs into the lungs 4 times daily as needed for Wheezing 18 g 0    ibuprofen (ADVIL;MOTRIN) 600 MG tablet Take 1 tablet by mouth 3 times daily as needed for Pain 30 tablet 0    acetaminophen (AMINOFEN) 325 MG tablet Take 2 tablets by mouth every 6 hours as needed for Pain 30 tablet 0    sodium chloride (OCEAN NASAL SPRAY) 0.65 % nasal spray 1 spray by Nasal route as needed for Congestion 1 each 3    rizatriptan (MAXALT) 5 MG tablet Take 1 tablet by mouth once as needed for Migraine May repeat in 2 hours if needed 10 tablet 3     No current facility-administered medications for this visit.        Past Medical History:   Diagnosis Date    Lumbar sprain        Past Surgical History:   Procedure Laterality Date    ABDOMINAL EXPLORATION SURGERY      BUNIONECTOMY Right     EAR SURGERY          Social History     Socioeconomic History    Marital status:      Spouse name: Not on file    Number of children: Not on file    Years of education: Not on file    Highest education level: Not on file   Occupational History    Not on file   Tobacco Use    Smoking status: Former Smoker     Packs/day: 0.50     Years: 24.00     Pack years: 12.00     Quit date: 10/20/2021     Years since quittin.1    Smokeless tobacco: Never Used   Vaping Use    Vaping Use: Never used   Substance and Sexual Activity    Alcohol use: Yes     Comment: Rare    Drug use: Never    Sexual activity: Not on file   Other Topics Concern    Not on file   Social History Narrative    Not on file     Social Determinants of Health     Financial Resource Strain:     Difficulty of Paying Living Expenses: Not on file   Food Insecurity:     Worried About [employer] [employer] in the Last Year: Not on file    Ramsey of Food in the Last Year: Not on file   Transportation Needs:     Lack of Transportation (Medical): Not on file    Lack of Transportation (Non-Medical):  Not on file   Physical Activity:     Days of Exercise per Week: Not on file    Minutes of Exercise per Session: Not on file   Stress:     Feeling of Stress : Not on file   Social Connections:     Frequency of Communication with Friends and Family: Not on file    Frequency of Social Gatherings with Friends and Family: Not on file    Attends Zoroastrianism Services: Not on file    Active Member of [address] [place] [address] or Organizations: Not on file    Attends Club or Organization Meetings: Not on file    Marital Status: Not on file   Intimate Partner Violence:     Fear of Current or Ex-Partner: Not on file    Emotionally Abused: Not on file    Physically Abused: Not on file    Sexually Abused: Not on file   Housing Stability:     Unable to Pay for Housing in the Last Year: Not on file    Number of [place] in the Last Year: Not on file    Unstable Housing in the Last Year: Not on file       No family history on file.     Objective    Physical Exam:  Also present during visit:None    Constitutional   Weight: well nourished  Heart/Vascular   No cyanosis or clubbing appreciated  Neck   Appearance/Palpation/Auscultation: supple  Mental Status   Orientation: oriented to person, oriented to place, oriented to problem and oriented to time   Mood/Affect: appropriate mood and appropriate affect,    Memory/Other: recent memory intact, remote memory intact, fund of knowledge intact, attention span normal and concentration normal  Language   Language: (normal) language, no dysarthria, (normal) articulation and no dysphasia/aphasia  Cranial Nerves   CN II Right: visual fields appear intact   CN II Left: visual fields appear intact   CN III, IV, VI: EOM no nystagmus, normal pursuit and extraocular muscle strength normal   CN III: pupil normal size, pupil reactive to light and dark, pupil accomodates and no ptosis   CN IV: normal   CN VI: normal   CN V Right: normal sensation and muscles of mastication intact   CN V Left: normal sensation and muscles of mastication intact   CN VII Right: normal facial expression   CN VII Left: normal facial expression   CN VIII Right: hearing in tact to normal conversation   CN VIII Left: hearing in tact to normal conversation   CN IX,X: normal palatal movement   CN XI Right: normal sternocleidomastoid and normal trapezius   CN XI Left: normal sternocleidomastoid and normal trapezius   CN XII: no tremors of the tongue, no fasciculation of the tongue, tongue protrudes midline, normal power to left and normal power to right  Gait and Stance   Gait/Posture: station normal, ambulates independently, and gait normal  Motor/Coordination Exam   General: no bradykinesia, no tremors, no chorea, no athetosis, no myoclonus and no dyskinesia   Right Upper Extremity: normal motor strength, normal bulk and normal tone   Left Upper Extremity: normal motor strength, normal bulk and normal tone   Right Lower Extremity: normal motor strength, normal bulk and normal tone   Left Lower Extremity: normal motor strength, normal bulk and normal tone   Coordination: no drift, normal finger-to-nose, normal heel-to-shin and rapid alternating movements normal  Reflexes   Reflexes Right: 2/4 biceps, brachioradialis, patellar, and 1/4 achilles    Reflexes Left: 2/4 biceps, brachioradialis, patellar, and 2/4 achilles    Hoffmans Reflex Right: absent   Hoffmans Reflex Left: absent  Sensory   Sensation: normal light touch,  normal temperature, diminished vibration to the right hand otherwise, normal vibration, normal DSS, and no neglect    Lungs   No auditory wheezing  Skin   Inspection: no jaundice, no lesions, no rashes and no cyanosis      /84 (Site: Left Upper Arm, Position: Sitting, Cuff Size: Large Adult)   Pulse 80   Ht 5' 5\" (1.651 m)   Wt 220 lb 6.4 oz (100 kg)   SpO2 98%   BMI 36.68 kg/m²     Assessment and Plan     Diagnosis Orders   1. Chronic migraine without aura without status migrainosus, not intractable  MRI BRAIN WO CONTRAST    tiZANidine (ZANAFLEX) 4 MG tablet    indomethacin (INDOCIN) 25 MG capsule    famotidine (PEPCID) 20 MG tablet    CTA HEAD NECK W WO CONTRAST   2. Bilateral occipital neuralgia  MRI BRAIN WO CONTRAST    tiZANidine (ZANAFLEX) 4 MG tablet    indomethacin (INDOCIN) 25 MG capsule    famotidine (PEPCID) 20 MG tablet   3. Family history of cerebral aneurysm  CTA HEAD NECK W WO CONTRAST     Radha Marsh was seen today in neurologic consultation for headache. After taking a detailed history and performing a thorough neurologic examination, I do feel that Radha Marsh is suffering from chronic migraine. She currently describes status migrainosus. I also feel there is a superimposed bilateral occipital neuralgia. Given her headaches have abruptly change starting in September, I do feel obtaining further neuroimaging is warranted at this time.   We will obtain an MRI brain without contrast.  Additionally, I would like to get Tracy Mary started on a migraine preventative that will also help to target sleep and muscle tension. She will initiate tizanidine by starting with 2 mg nightly for 8 nights and then increase up to 4 mg thereafter. To abort her current migraine cycle, I will be initiating her on indomethacin 50 mg twice daily for 1 week. She will also take Pepcid in association with this. Should this not work to abort her current migraine cycle, we will give consideration for a Medrol Dosepak, Compazine or occipital nerve blocks moving forward. Once we have this current cycle broken we can then reassess for appropriate abortive migraine medication. She also reports strong family history of cerebral aneurysm in both her mother and maternal grandmother. As such, I would also like to obtain vessel imaging looking at her intra and extracranial vasculature. Medications prescribed for the patient were discussed in detail. This included a discussion of the potential risks vs the potential benefits of the medications. The patient was given time to ask questions and these were answered to the best of my ability. The patient appeared to understand the information provided. Thank you for allowing us to participate in the care of your patient. Please do not hesitate to contact us with questions. I spent 80 minutes total time regarding this patient's encounter. This included obtaining history, performing a neurological medical exam, developing an assessment / plan, and documenting via EMR. In addition I reviewed the following: labs and other medical records. I ordered the following: testing and medication Rx    I counseled regarding the visit and plan: patient      Return in about 3 months (around 3/7/2022) for follow up with DONYA.     Maryan Hoffman DO

## 2021-12-07 NOTE — LETTER
[facility]  [address] [place]  [place]  Phone: [phone]  Fax: [phone]    Maira Huerta DO        December 7, 2021     Patient: Francy Alfonso   YOB: 1980   Date of Visit: 12/7/2021       To Whom It May Concern: Francy Alfonso was seen in my office for an appointment. She may return to work on 12/07/2021. If you have any questions or concerns, please don't hesitate to call.     Sincerely,        Maira Huerta DO

## 2021-12-15 ENCOUNTER — TELEPHONE (OUTPATIENT)
Dept: NEUROLOGY | Age: 41
End: 2021-12-15

## 2021-12-15 NOTE — TELEPHONE ENCOUNTER
Ruy Rene called back and states she is totally out of the indomethacin and famotidine. She states that did help relieve some of the migraine but she still has a lot of pressure and pain. She would like to see what else she can do for this? Please advise.

## 2021-12-16 DIAGNOSIS — M19.90 ARTHRITIS: ICD-10-CM

## 2021-12-16 RX ORDER — IBUPROFEN 800 MG/1
800 TABLET ORAL EVERY 6 HOURS PRN
Qty: 120 TABLET | Refills: 2 | OUTPATIENT
Start: 2021-12-16

## 2021-12-17 DIAGNOSIS — G43.709 CHRONIC MIGRAINE WITHOUT AURA WITHOUT STATUS MIGRAINOSUS, NOT INTRACTABLE: Primary | ICD-10-CM

## 2021-12-17 DIAGNOSIS — M54.81 BILATERAL OCCIPITAL NEURALGIA: ICD-10-CM

## 2021-12-17 RX ORDER — METHYLPREDNISOLONE 4 MG/1
TABLET ORAL
Qty: 1 KIT | Refills: 0 | Status: SHIPPED | OUTPATIENT
Start: 2021-12-17 | End: 2022-03-03 | Stop reason: ALTCHOICE

## 2021-12-17 NOTE — TELEPHONE ENCOUNTER
Spoke to Dr. Amy Houston via phone and she explained the medication Villalpando Age was on for a week is only meant to be short term due to it causing stomach upset. [place] recommended either one more dose of the indomethacin or to do the suggested longer term medications as listed below.

## 2021-12-17 NOTE — TELEPHONE ENCOUNTER
Hello,    We discussed at her appointment we could also consider medrol dosepak or occipital nerve blocks. She was hesitant for steroids due to weight gain but I do thinnk this would be a good next step. Another oral alternative would be nightly compazine for a week.     Dexter Lobo, DO 12/17/2021 12:56 PM

## 2021-12-17 NOTE — TELEPHONE ENCOUNTER
I called Flaco Hay back and let her know what  said. She said she will try the longer term medications (indomethacin & medrol dose charla). I let her know Skye Duggan would get those sent into her pharmacy later this evening after she is done rounding at the hospital. She states verbal understanding.

## 2021-12-17 NOTE — TELEPHONE ENCOUNTER
I called pt to relay the message. She states she doesn't understand why she has to try all these different medications when the ones she was on was working. She asked if those could just be refilled? Please advise.

## 2021-12-19 DIAGNOSIS — R51.9 CHRONIC NONINTRACTABLE HEADACHE, UNSPECIFIED HEADACHE TYPE: ICD-10-CM

## 2021-12-19 DIAGNOSIS — G89.29 CHRONIC NONINTRACTABLE HEADACHE, UNSPECIFIED HEADACHE TYPE: ICD-10-CM

## 2021-12-20 RX ORDER — BUTALBITAL, ACETAMINOPHEN AND CAFFEINE 50; 325; 40 MG/1; MG/1; MG/1
1 TABLET ORAL EVERY 4 HOURS PRN
Qty: 20 TABLET | Refills: 1 | OUTPATIENT
Start: 2021-12-20

## 2021-12-20 NOTE — TELEPHONE ENCOUNTER
Patient called and stated she wants the Fioricet back the neurologist is not doing anything to help her with her headaches. They gave her a steroids she stated that does nothing for her headache. She was upset due to central scheduling has not contacted her to schedule her MRI. I attempted to give patient the phone number to contact central scheduling she stated she has the number she should not have to call herself.  Patient stated she is unhappy with the neurologist please advise

## 2021-12-20 NOTE — TELEPHONE ENCOUNTER
Please call patient back to further discuss; will defer migraine management to patient's neurologist; advise patient it would be most appropriate for her to call and speak with neurology clinic and advise that current treatment is not currently providing benefit and she is requesting alternative therapy etc.; if patient would like to see a different neurologist; advise patient to discuss with her insurance in terms of what neurology clinic insurance would like her to see etc.

## 2021-12-21 ENCOUNTER — TELEPHONE (OUTPATIENT)
Dept: FAMILY MEDICINE CLINIC | Age: 41
End: 2021-12-21

## 2021-12-22 DIAGNOSIS — G89.29 CHRONIC NONINTRACTABLE HEADACHE, UNSPECIFIED HEADACHE TYPE: ICD-10-CM

## 2021-12-22 DIAGNOSIS — R51.9 CHRONIC NONINTRACTABLE HEADACHE, UNSPECIFIED HEADACHE TYPE: ICD-10-CM

## 2021-12-22 RX ORDER — BUTALBITAL, ACETAMINOPHEN AND CAFFEINE 50; 325; 40 MG/1; MG/1; MG/1
1 TABLET ORAL PRN
Qty: 15 TABLET | Refills: 1 | Status: SHIPPED | OUTPATIENT
Start: 2021-12-22 | End: 2022-02-14 | Stop reason: SDUPTHER

## 2021-12-22 NOTE — TELEPHONE ENCOUNTER
Refill provided with 1 refill. She should not take more than 2-3 times per week. Unfortunately this is not a long term plan for migraine prevention. If tizanidine has provided no benefit we could consider increased dose if no side effects.  If not, an alternative medication for migraine prevention could be considered

## 2021-12-22 NOTE — TELEPHONE ENCOUNTER
Patient called and left a message stating she really wanted Dr. Vonnie Lowe to refill her Fioricet medication because it is the only thing that helps her migraines. Upon looking at her chart her PCP Kalina Rowland wrote for it last time. Either Dr. Vonnie Lowe can write for a new script and send it to [facility] in [facility], or the patient should continue to have her PCP write for it. Dr. Vonnie Lowe to please advise on which to proceed with.

## 2021-12-28 ENCOUNTER — HOSPITAL ENCOUNTER (OUTPATIENT)
Dept: MRI IMAGING | Age: 41
Discharge: HOME | End: 2021-12-28
Payer: COMMERCIAL

## 2021-12-28 ENCOUNTER — HOSPITAL ENCOUNTER (OUTPATIENT)
Dept: CT IMAGING | Age: 41
Discharge: HOME | End: 2021-12-28
Payer: COMMERCIAL

## 2021-12-28 DIAGNOSIS — G43.709 CHRONIC MIGRAINE WITHOUT AURA WITHOUT STATUS MIGRAINOSUS, NOT INTRACTABLE: ICD-10-CM

## 2021-12-28 DIAGNOSIS — M54.81 BILATERAL OCCIPITAL NEURALGIA: ICD-10-CM

## 2021-12-28 DIAGNOSIS — Z82.49 FAMILY HISTORY OF CEREBRAL ANEURYSM: ICD-10-CM

## 2021-12-28 PROCEDURE — 6360000004 HC RX CONTRAST MEDICATION: Performed by: STUDENT IN AN ORGANIZED HEALTH CARE EDUCATION/TRAINING PROGRAM

## 2021-12-28 PROCEDURE — 70551 MRI BRAIN STEM W/O DYE: CPT

## 2021-12-28 PROCEDURE — 70496 CT ANGIOGRAPHY HEAD: CPT

## 2021-12-28 RX ADMIN — IOPAMIDOL 75 ML: 755 INJECTION, SOLUTION INTRAVENOUS at 16:07

## 2021-12-29 ENCOUNTER — TELEPHONE (OUTPATIENT)
Dept: NEUROLOGY | Age: 41
End: 2021-12-29

## 2021-12-29 NOTE — TELEPHONE ENCOUNTER
Chula Disla called and was wanting to know the results of her MRI & CT she had done yesterday. She also wanted to let you know there is a tender and stabbing pain behind her left ear on her head that has been bothering her for about 3 day now. There is no visible lump or mass that is seen or felt. She states its new and doesn't know if she's being paranoid but wanted you to be aware of it.

## 2022-01-02 NOTE — TELEPHONE ENCOUNTER
Her MRI brain and CTA Head/Neck are normal. Specifically to address her concerns, no evidence of tumor or aneurysm. Regarding pain behind her ear, she should follow up with her PCP or her ENT doctor as I see she was recently being seen by one.

## 2022-01-07 ENCOUNTER — TELEPHONE (OUTPATIENT)
Dept: NEUROLOGY | Age: 42
End: 2022-01-07

## 2022-01-07 NOTE — TELEPHONE ENCOUNTER
Ernesto Sheriff from [address] called and left a message stating the CTA Head & Neck that was done on 12/28/21 is in need of a provider to provider phone call. Number to call to complete is [phone] and use the case #: [id number].

## 2022-01-10 NOTE — TELEPHONE ENCOUNTER
Completed peer to peer. Call RCX#[id number]. Spoke with Dr. Angelique Hendrix. CTA Head approved - Ref # [id number]. CTA neck denied.     Mayo Evangelista DO 1/10/2022 3:59 PM

## 2022-02-14 ENCOUNTER — TELEPHONE (OUTPATIENT)
Dept: FAMILY MEDICINE CLINIC | Age: 42
End: 2022-02-14

## 2022-02-14 DIAGNOSIS — G89.29 CHRONIC NONINTRACTABLE HEADACHE, UNSPECIFIED HEADACHE TYPE: ICD-10-CM

## 2022-02-14 DIAGNOSIS — G89.29 CHRONIC NONINTRACTABLE HEADACHE, UNSPECIFIED HEADACHE TYPE: Primary | ICD-10-CM

## 2022-02-14 DIAGNOSIS — R51.9 CHRONIC NONINTRACTABLE HEADACHE, UNSPECIFIED HEADACHE TYPE: Primary | ICD-10-CM

## 2022-02-14 DIAGNOSIS — R51.9 CHRONIC NONINTRACTABLE HEADACHE, UNSPECIFIED HEADACHE TYPE: ICD-10-CM

## 2022-02-14 RX ORDER — BUTALBITAL, ACETAMINOPHEN AND CAFFEINE 50; 325; 40 MG/1; MG/1; MG/1
1 TABLET ORAL PRN
Qty: 15 TABLET | Refills: 1 | Status: SHIPPED | OUTPATIENT
Start: 2022-02-14 | End: 2022-05-02

## 2022-02-14 RX ORDER — IBUPROFEN 600 MG/1
600 TABLET ORAL 3 TIMES DAILY PRN
Qty: 30 TABLET | Refills: 0 | Status: SHIPPED | OUTPATIENT
Start: 2022-02-14 | End: 2022-03-03 | Stop reason: SDUPTHER

## 2022-02-14 NOTE — TELEPHONE ENCOUNTER
Patient left a voicemail and requested a refill.        Requested Prescriptions     Pending Prescriptions Disp Refills    butalbital-acetaminophen-caffeine (FIORICET, ESGIC) -40 MG per tablet 15 tablet 1     Sig: Take 1 tablet by mouth as needed for Headaches or Migraine (for migraine)

## 2022-02-15 ENCOUNTER — TELEPHONE (OUTPATIENT)
Dept: NEUROLOGY | Age: 42
End: 2022-02-15

## 2022-02-15 NOTE — TELEPHONE ENCOUNTER
After speaking to [address] (doctor in office today) Called and spoke to [facility] pharmacist and let her know the sig should be Take 1 tablet by mouth every 6 hours as needed for migraine. No more than 4 in a 24 hr period. She states she will change that and get it ready for pt.

## 2022-03-03 ENCOUNTER — OFFICE VISIT (OUTPATIENT)
Dept: FAMILY MEDICINE CLINIC | Age: 42
End: 2022-03-03
Payer: COMMERCIAL

## 2022-03-03 VITALS
DIASTOLIC BLOOD PRESSURE: 82 MMHG | RESPIRATION RATE: 16 BRPM | SYSTOLIC BLOOD PRESSURE: 130 MMHG | BODY MASS INDEX: 36.31 KG/M2 | HEART RATE: 91 BPM | OXYGEN SATURATION: 98 % | TEMPERATURE: 97.8 F | WEIGHT: 218.2 LBS

## 2022-03-03 DIAGNOSIS — F41.1 GAD (GENERALIZED ANXIETY DISORDER): Primary | ICD-10-CM

## 2022-03-03 DIAGNOSIS — R51.9 CHRONIC NONINTRACTABLE HEADACHE, UNSPECIFIED HEADACHE TYPE: ICD-10-CM

## 2022-03-03 DIAGNOSIS — G89.29 CHRONIC NONINTRACTABLE HEADACHE, UNSPECIFIED HEADACHE TYPE: ICD-10-CM

## 2022-03-03 PROCEDURE — G8484 FLU IMMUNIZE NO ADMIN: HCPCS | Performed by: FAMILY MEDICINE

## 2022-03-03 PROCEDURE — 99214 OFFICE O/P EST MOD 30 MIN: CPT | Performed by: FAMILY MEDICINE

## 2022-03-03 PROCEDURE — G8417 CALC BMI ABV UP PARAM F/U: HCPCS | Performed by: FAMILY MEDICINE

## 2022-03-03 PROCEDURE — 1036F TOBACCO NON-USER: CPT | Performed by: FAMILY MEDICINE

## 2022-03-03 PROCEDURE — G8427 DOCREV CUR MEDS BY ELIG CLIN: HCPCS | Performed by: FAMILY MEDICINE

## 2022-03-03 RX ORDER — IBUPROFEN 600 MG/1
600 TABLET ORAL 3 TIMES DAILY PRN
Qty: 30 TABLET | Refills: 5 | Status: SHIPPED | OUTPATIENT
Start: 2022-03-03 | End: 2022-08-25 | Stop reason: SDUPTHER

## 2022-03-03 RX ORDER — HYDROXYZINE 50 MG/1
50 TABLET, FILM COATED ORAL EVERY 8 HOURS PRN
Qty: 180 TABLET | Refills: 3 | Status: SHIPPED | OUTPATIENT
Start: 2022-03-03 | End: 2022-08-25 | Stop reason: SDUPTHER

## 2022-03-03 ASSESSMENT — PATIENT HEALTH QUESTIONNAIRE - PHQ9
6. FEELING BAD ABOUT YOURSELF - OR THAT YOU ARE A FAILURE OR HAVE LET YOURSELF OR YOUR FAMILY DOWN: 0
7. TROUBLE CONCENTRATING ON THINGS, SUCH AS READING THE NEWSPAPER OR WATCHING TELEVISION: 0
SUM OF ALL RESPONSES TO PHQ9 QUESTIONS 1 & 2: 6
9. THOUGHTS THAT YOU WOULD BE BETTER OFF DEAD, OR OF HURTING YOURSELF: 0
SUM OF ALL RESPONSES TO PHQ QUESTIONS 1-9: 14
SUM OF ALL RESPONSES TO PHQ QUESTIONS 1-9: 14
3. TROUBLE FALLING OR STAYING ASLEEP: 3
1. LITTLE INTEREST OR PLEASURE IN DOING THINGS: 3
SUM OF ALL RESPONSES TO PHQ QUESTIONS 1-9: 14
SUM OF ALL RESPONSES TO PHQ QUESTIONS 1-9: 14
5. POOR APPETITE OR OVEREATING: 2
2. FEELING DOWN, DEPRESSED OR HOPELESS: 3
8. MOVING OR SPEAKING SO SLOWLY THAT OTHER PEOPLE COULD HAVE NOTICED. OR THE OPPOSITE, BEING SO FIGETY OR RESTLESS THAT YOU HAVE BEEN MOVING AROUND A LOT MORE THAN USUAL: 0
4. FEELING TIRED OR HAVING LITTLE ENERGY: 3
10. IF YOU CHECKED OFF ANY PROBLEMS, HOW DIFFICULT HAVE THESE PROBLEMS MADE IT FOR YOU TO DO YOUR WORK, TAKE CARE OF THINGS AT HOME, OR GET ALONG WITH OTHER PEOPLE: 0

## 2022-03-03 NOTE — LETTER
[facility] & JEAN CLAUDE [facility] [address] [place] [id number]  Phone: [phone]  Fax: [phone]    Mamie Jorge MD        March 3, 2022     Patient: John Joyner   YOB: 1980   Date of Visit: 3/3/2022       To Whom It May Concern: It is my medical opinion that John Joyner was seen in my office on 3/3/2022 may return to work today. If you have any questions or concerns, please don't hesitate to call.     Sincerely,        Mamie Jorge MD

## 2022-03-03 NOTE — PROGRESS NOTES
[address]  & [facility]  [place] [address] [address]  Dept: [phone]  Loc: [phone]        ASSESSMENT/PLAN:    1. RC (generalized anxiety disorder)  -     hydrOXYzine (ATARAX) 50 MG tablet; Take 1 tablet by mouth every 8 hours as needed for Itching, Disp-180 tablet, R-3Normal  -     sertraline (ZOLOFT) 50 MG tablet; Take 1 tablet by mouth daily, Disp-30 tablet, R-2Normal  - Patient reports some improvement of anxiety while she is taking hydroxyzine and reports some relief of symptoms while she was on Zoloft in the past.  Patient reports that Xanax used to help her significantly when she was in [place], however, patient was educated on the dependence and abuse capacity of Xanax. Patient is agreeable to take medication that were prescribed today    2. Chronic nonintractable headache, unspecified headache type  -     ibuprofen (ADVIL;MOTRIN) 600 MG tablet; Take 1 tablet by mouth 3 times daily as needed for Pain, Disp-30 tablet, R-5Normal  - Patient is willing to continue taking her ibuprofen for headaches. Patient did request for Fioricet because that is what has been helping her headache. Patient reports that she is not interested in the new treatment that her neurologist was about to start. Return in about 3 months (around 6/3/2022). Patient's Name: Magalene Prader   YOB: 1980   Age: 35 y.o. Date of service: 3/3/2022    Chief Complaint:   Chief Complaint   Patient presents with   Araceli Manual Establish Care     patient is here to establish care     Discuss Medications     would like to discuss changing her anxiety medications         Patient ID: Magalene Prader is a 35 y.o. female.    Chief Complaint   Patient presents with   Araceli Manual Establish Care     patient is here to establish care     Discuss Medications     would like to discuss changing her anxiety medications        HPI    Patient is a 78-year-old female with a history of generalized anxiety disorder and chronic headaches who presents to the office for follow-up. Patient reports that she is having frequent episodes of anxiety and panic attacks. Patient reports that she has tried many medications in the past and was seen in this office and prescribed Effexor. Patient reports some relief from Effexor. Patient reports that when she was in [place] she was on Xanax which really helped her symptoms. Patient reports that she is presently taking hydroxyzine that she has at home and reports some improvement. Patient is requesting additional prescription of hydroxyzine. Additionally, Patient reports that she is having some episodes of headache and was seen in this office and referred to neurology. Patient reports that Fioricet  helped with the headache, however, her neurologist  wanted to try a new medication including steroids. Patient reports that she is not interested in trying other options she prefers to be on Fioricet. Patient was educated on the dependence and abuse capacity of Fioricet. Patient denies any other acute complaints today. 12 point review of systems were negative other than in the HPI     Physical Exam  General: alert, awake, and oriented to time, place, person, and situation. Not in acute distress   Ear, nose, throat, Head: Normal external ears, pupils are equally round, EOMI, normocephalic/atraumatic  Heart: regular rate and rhythm, no audible murmurs, no audible friction rub  Lungs: clear to auscultation bilaterally, no audible crackles, no audible wheezes, no audible rhonchi    Abdomen: normal bowel sounds, soft abdomen, non-tender, no palpable masses  Extremities: no edema, warm, no cyanosis, no clubbing.  Good capillary refill   Peripheral vascular: 2+ pulses symmetric (radial)  Neuro: sensation intact and symmetric  Psych: anxious, normal affect, normal thoughts     Patient History:  Past Medical History:   Diagnosis Date    Headache     migraines    Lumbar sprain      Past Surgical History:   Procedure Laterality Date    ABDOMINAL EXPLORATION SURGERY      BUNIONECTOMY Right     EAR SURGERY       Social History     Socioeconomic History    Marital status:      Spouse name: Not on file    Number of children: Not on file    Years of education: Not on file    Highest education level: Not on file   Occupational History    Not on file   Tobacco Use    Smoking status: Former Smoker     Packs/day: 0.50     Years: 24.00     Pack years: 12.00     Quit date: 10/20/2021     Years since quittin.3    Smokeless tobacco: Never Used   Vaping Use    Vaping Use: Never used   Substance and Sexual Activity    Alcohol use: Yes     Comment: Rare    Drug use: Never    Sexual activity: Not on file   Other Topics Concern    Not on file   Social History Narrative    Not on file     Social Determinants of Health     Financial Resource Strain:     Difficulty of Paying Living Expenses: Not on file   Food Insecurity:     Worried About [address] in the Last Year: Not on file    [address] in the Last Year: Not on file   Transportation Needs:     Lack of Transportation (Medical): Not on file    Lack of Transportation (Non-Medical):  Not on file   Physical Activity:     Days of Exercise per Week: Not on file    Minutes of Exercise per Session: Not on file   Stress:     Feeling of Stress : Not on file   Social Connections:     Frequency of Communication with Friends and Family: Not on file    Frequency of Social Gatherings with Friends and Family: Not on file    Attends Confucianism Services: Not on file    Active Member of Clubs or Organizations: Not on file    Attends Club or Organization Meetings: Not on file    Marital Status: Not on file   Intimate Partner Violence:     Fear of Current or Ex-Partner: Not on file    Emotionally Abused: Not on file    Physically Abused: Not on file    Sexually Abused: Not on Body mass index is 36.31 kg/m². All questions answered to patient's satisfaction. The patient was counseled regarding impressions, instructions for management and importance of compliance with treatment. Return in about 3 months (around 6/3/2022).     Elidia Kwok MD

## 2022-03-03 NOTE — LETTER
[facility] & [facility] [address] [place] [address]  Phone: [phone]  Fax: [phone]    Monica Chacon MD        March 3, 2022    Blossom Clark  [address] [place] [address]      Dear Bhumi Freeman:    ***    If you have any questions or concerns, please don't hesitate to call.     Sincerely,        Monica Chacon MD

## 2022-03-25 DIAGNOSIS — F41.1 GAD (GENERALIZED ANXIETY DISORDER): ICD-10-CM

## 2022-03-28 RX ORDER — HYDROXYZINE 50 MG/1
50 TABLET, FILM COATED ORAL EVERY 8 HOURS PRN
Qty: 90 TABLET | Refills: 7 | OUTPATIENT
Start: 2022-03-28 | End: 2022-11-23

## 2022-04-23 DIAGNOSIS — F41.1 GAD (GENERALIZED ANXIETY DISORDER): ICD-10-CM

## 2022-04-25 NOTE — DISCHARGE SUMMARY
Outpatient Physical Therapy           [facility]           [] Phone: [phone]   Fax: [phone]  [facility]           [x] Phone: [phone]   Fax: [phone]      Elizabeth Melgar PA     PCP: [place] Harris:       From: [facility]ap[facility] Chiang PT,     Patient: Britta Weber                    : 1980  Diagnosis:  Strain of muscle, fascia and tendon of lower back, initial encounter [S39.012A]    No data recorded     Date: 2022  Treatment Diagnosis:         []  Progress Note                [x]  Discharge Note    Evaluation Date:  21   Total Visits to date: 8   Cancels/No-shows to date:      Subjective:  21 Patient reports of 5/10 pain upon arrival and reports it hurts very very bad if she  Sits for a long time, walking for long periods bother her. Having trouble standing up straight she reports it feels as if she has a bulge there and her right side low back to her glut cheek is still numb.           Plan of Care/Treatment to date:  [x] Therapeutic Exercise    [] Modalities:  [x] Therapeutic Activity     [] Ultrasound  [] Electrical Stimulation  [] Gait Training      [] Cervical Traction   [x] Lumbar Traction  [x] Neuromuscular Re-education  [] Cold/hotpack [] Iontophoresis  [x] Instruction in HEP      Other:  [x] Manual Therapy       []  Vasopneumatic  [] Aquatic Therapy       []   Dry Needle Therapy                      Objective/Significant Findings At Last Visit/Comments:  Reported pain relief with treatment        Goal Status:  [] Achieved [x] Partially Achieved  [] Not Achieved       Long term goal 1: Pt will demonstrate I with current HEP as prescribed in order to increase ROM, strength, balance, endurance, functional mobility, and decrease pain.    Long term goal 2: Pt will demonstrate Mod Oswestry disability score of 30% to improve quality of life.   Long term goal 3: Pt will demonstrate no worse pain than 3/10 with activity to improve quality of life.    Long term goal 4: Pt will demonstrate no limp and increased stance time on R LE during gait to improve functional mobility.            L                  [x] Patient now discharged - has not scheduled further OP PT    Electronically signed by:  Jaclyn Yañez, STEPHEN,, 4/25/2022, 9:15 AM    If you have any questions or concerns, please don't hesitate to call.   Thank you for your referral.

## 2022-04-28 DIAGNOSIS — G89.29 CHRONIC NONINTRACTABLE HEADACHE, UNSPECIFIED HEADACHE TYPE: ICD-10-CM

## 2022-04-28 DIAGNOSIS — R51.9 CHRONIC NONINTRACTABLE HEADACHE, UNSPECIFIED HEADACHE TYPE: ICD-10-CM

## 2022-04-28 RX ORDER — RIZATRIPTAN BENZOATE 5 MG/1
5 TABLET ORAL
Qty: 9 TABLET | Refills: 3 | OUTPATIENT
Start: 2022-04-28 | End: 2022-04-28

## 2022-04-28 NOTE — TELEPHONE ENCOUNTER
Requested Prescriptions     Pending Prescriptions Disp Refills    butalbital-acetaminophen-caffeine (FIORICET, ESGIC) -40 MG per tablet [Pharmacy Med Name: [id number] -40] 15 tablet 1     Sig: TAKE 1 TABLET BY MOUTH EVERY 6 HOURS AS NEEDED FOR MIGRAINE, MAX 4 IN 24 HOURS

## 2022-05-02 RX ORDER — BUTALBITAL, ACETAMINOPHEN AND CAFFEINE 50; 325; 40 MG/1; MG/1; MG/1
TABLET ORAL
Qty: 15 TABLET | Refills: 1 | Status: SHIPPED | OUTPATIENT
Start: 2022-05-02 | End: 2022-07-18 | Stop reason: SDUPTHER

## 2022-05-16 ENCOUNTER — TELEPHONE (OUTPATIENT)
Dept: FAMILY MEDICINE CLINIC | Age: 42
End: 2022-05-16

## 2022-05-16 NOTE — TELEPHONE ENCOUNTER
Patient called in complaining of rectal bleeding with passing Clots. Patient was advised to be seen at ED for not improving symptoms. Patient says she does have hemorrhoids, but they are not acting up currently. Patient is in agreement to be seen at ED today with no further questions at this time.

## 2022-06-02 DIAGNOSIS — F41.1 GAD (GENERALIZED ANXIETY DISORDER): ICD-10-CM

## 2022-06-15 DIAGNOSIS — F41.9 ANXIETY: ICD-10-CM

## 2022-06-15 RX ORDER — VENLAFAXINE HYDROCHLORIDE 75 MG/1
CAPSULE, EXTENDED RELEASE ORAL
Qty: 90 CAPSULE | Refills: 1 | Status: SHIPPED | OUTPATIENT
Start: 2022-06-15 | End: 2022-10-10

## 2022-07-11 DIAGNOSIS — R51.9 CHRONIC NONINTRACTABLE HEADACHE, UNSPECIFIED HEADACHE TYPE: ICD-10-CM

## 2022-07-11 DIAGNOSIS — G89.29 CHRONIC NONINTRACTABLE HEADACHE, UNSPECIFIED HEADACHE TYPE: ICD-10-CM

## 2022-07-11 DIAGNOSIS — F41.1 GAD (GENERALIZED ANXIETY DISORDER): ICD-10-CM

## 2022-07-11 NOTE — TELEPHONE ENCOUNTER
Requested Prescriptions     Pending Prescriptions Disp Refills    butalbital-acetaminophen-caffeine (FIORICET, ESGIC) -40 MG per tablet [Pharmacy Med Name: [id number] -40] 15 tablet 1     Sig: TAKE 1 TABLET BY MOUTH EVERY 6 HOURS AS NEEDED FOR MIGRAINE, MAX 4 IN 24 HOURS

## 2022-07-12 RX ORDER — BUTALBITAL, ACETAMINOPHEN AND CAFFEINE 50; 325; 40 MG/1; MG/1; MG/1
TABLET ORAL
Qty: 15 TABLET | Refills: 1 | OUTPATIENT
Start: 2022-07-12

## 2022-07-13 NOTE — TELEPHONE ENCOUNTER
Please reach out to patient for follow up appointment due to Dr. Maribel Sotomayor not feeling comfortable with filling this med.

## 2022-07-15 NOTE — TELEPHONE ENCOUNTER
Pt called inquiring about her rx request. 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 She stated she can make it in next month upon training their new hire. Please advise.

## 2022-07-18 RX ORDER — BUTALBITAL, ACETAMINOPHEN AND CAFFEINE 50; 325; 40 MG/1; MG/1; MG/1
TABLET ORAL
Qty: 15 TABLET | Refills: 0 | Status: SHIPPED | OUTPATIENT
Start: 2022-07-18 | End: 2022-08-25 | Stop reason: SDUPTHER

## 2022-08-03 DIAGNOSIS — F41.1 GAD (GENERALIZED ANXIETY DISORDER): ICD-10-CM

## 2022-08-25 DIAGNOSIS — R51.9 CHRONIC NONINTRACTABLE HEADACHE, UNSPECIFIED HEADACHE TYPE: ICD-10-CM

## 2022-08-25 DIAGNOSIS — F41.1 GAD (GENERALIZED ANXIETY DISORDER): ICD-10-CM

## 2022-08-25 DIAGNOSIS — G89.29 CHRONIC NONINTRACTABLE HEADACHE, UNSPECIFIED HEADACHE TYPE: ICD-10-CM

## 2022-08-25 RX ORDER — IBUPROFEN 600 MG/1
600 TABLET ORAL 3 TIMES DAILY PRN
Qty: 30 TABLET | Refills: 5 | Status: SHIPPED | OUTPATIENT
Start: 2022-08-25

## 2022-08-25 RX ORDER — HYDROXYZINE 50 MG/1
50 TABLET, FILM COATED ORAL EVERY 8 HOURS PRN
Qty: 180 TABLET | Refills: 3 | Status: SHIPPED | OUTPATIENT
Start: 2022-08-25 | End: 2023-04-22

## 2022-08-26 RX ORDER — BUTALBITAL, ACETAMINOPHEN AND CAFFEINE 50; 325; 40 MG/1; MG/1; MG/1
TABLET ORAL
Qty: 15 TABLET | Refills: 0 | Status: SHIPPED | OUTPATIENT
Start: 2022-08-26 | End: 2022-10-10 | Stop reason: SDUPTHER

## 2022-10-10 ENCOUNTER — OFFICE VISIT (OUTPATIENT)
Dept: NEUROLOGY | Age: 42
End: 2022-10-10
Payer: COMMERCIAL

## 2022-10-10 VITALS
OXYGEN SATURATION: 98 % | WEIGHT: 218 LBS | SYSTOLIC BLOOD PRESSURE: 118 MMHG | HEIGHT: 65 IN | DIASTOLIC BLOOD PRESSURE: 70 MMHG | HEART RATE: 95 BPM | BODY MASS INDEX: 36.32 KG/M2

## 2022-10-10 DIAGNOSIS — G89.29 CHRONIC NONINTRACTABLE HEADACHE, UNSPECIFIED HEADACHE TYPE: ICD-10-CM

## 2022-10-10 DIAGNOSIS — R51.9 CHRONIC NONINTRACTABLE HEADACHE, UNSPECIFIED HEADACHE TYPE: ICD-10-CM

## 2022-10-10 PROCEDURE — G8417 CALC BMI ABV UP PARAM F/U: HCPCS | Performed by: NURSE PRACTITIONER

## 2022-10-10 PROCEDURE — 1036F TOBACCO NON-USER: CPT | Performed by: NURSE PRACTITIONER

## 2022-10-10 PROCEDURE — G8427 DOCREV CUR MEDS BY ELIG CLIN: HCPCS | Performed by: NURSE PRACTITIONER

## 2022-10-10 PROCEDURE — 99214 OFFICE O/P EST MOD 30 MIN: CPT | Performed by: NURSE PRACTITIONER

## 2022-10-10 PROCEDURE — G8484 FLU IMMUNIZE NO ADMIN: HCPCS | Performed by: NURSE PRACTITIONER

## 2022-10-10 RX ORDER — TIZANIDINE 4 MG/1
4 TABLET ORAL NIGHTLY
Qty: 30 TABLET | Refills: 3 | Status: SHIPPED | OUTPATIENT
Start: 2022-10-10

## 2022-10-10 RX ORDER — RIMEGEPANT SULFATE 75 MG/75MG
TABLET, ORALLY DISINTEGRATING ORAL
Qty: 4 TABLET | Refills: 0 | COMMUNITY
Start: 2022-10-10

## 2022-10-10 RX ORDER — BUTALBITAL, ACETAMINOPHEN AND CAFFEINE 50; 325; 40 MG/1; MG/1; MG/1
TABLET ORAL
Qty: 15 TABLET | Refills: 0 | Status: SHIPPED | OUTPATIENT
Start: 2022-10-10

## 2022-10-10 NOTE — PROGRESS NOTES
10/10/22    Veto Achilles  1980    Chief Complaint   Patient presents with    Follow-up     Migraines still the same as last visit. History of Present Illness  Odin Edmond is a 38 y.o. female presenting today for follow-up of: chronic migraine. Her MRI brain and CTA head and neck were unremarkable, she has a family history of cerebral aneurysm. She has a bilateral and occipital headache that last more than 4 hours 28/30 days. They are associated with photo/phonophobia, nausea and emesis. Odin Edmond tells me today that she is only taking Fiorcet, Tylenol and Ibuprofen for her migraines. She is still having them 2-3 times per week. She did not try Tizanidine as she only took [id number] for her migraines because it helped decrease the intensity. Current preventative: Zoloft  Preventative tried: Effexor  Current abortive: Fiorcet, ibuprofen  Abortive tried: Maxalt    Current Outpatient Medications   Medication Sig Dispense Refill    tiZANidine (ZANAFLEX) 4 MG tablet Take 1 tablet by mouth nightly Take 1/2 tab nightly for 8 nights then increase to full tab. 30 tablet 3    butalbital-acetaminophen-caffeine (FIORICET, ESGIC) -40 MG per tablet TAKE 1 TABLET BY MOUTH EVERY 6 HOURS AS NEEDED FOR MIGRAINE, MAX 4 IN 24 HOURS 15 tablet 0    Rimegepant Sulfate (NURTEC) 75 MG TBDP LOT# [id number] EXP: 2/25 4 tablet 0    ibuprofen (ADVIL;MOTRIN) 600 MG tablet Take 1 tablet by mouth 3 times daily as needed for Pain 30 tablet 5    hydrOXYzine HCl (ATARAX) 50 MG tablet Take 1 tablet by mouth every 8 hours as needed for Itching 180 tablet 3    sertraline (ZOLOFT) 50 MG tablet TAKE 1 TABLET BY MOUTH EVERY DAY 90 tablet 3    albuterol sulfate HFA (VENTOLIN HFA) 108 (90 Base) MCG/ACT inhaler Inhale 2 puffs into the lungs 4 times daily as needed for Wheezing (Patient taking differently: Inhale 2 puffs into the lungs 4 times daily as needed for Wheezing prn) 18 g 0     No current facility-administered medications for this visit. Physical Exam:  Also present during visit:  alone . Mental Status   Orientation: oriented to person, oriented to place, oriented to problem, and oriented to time    Mood/affectappropriate mood and appropriate affect   Memory/Other: recent memory intact, remote memory intact, fund of knowledge intact, attention span normal, and concentration normal  Language  Language: (normal) language, no dysarthria, (normal) articulation, and no dysphasia/aphasia  Cranial Nerves   Eyes: pupils normal size and reactive to light and visual fields appear full   CN III, IV, VI : extraocular muscle strength normal, normal pursuit, no nystagmus, and no ptosis   Facial Motor: normal facial motor   CN XII: tongue protrudes midline  Motor/Coordination Exam   Power: motor strength appears intact throughout, no arm drift, and normal tone   Coordination: normal finger-to-nose, forearm rotation intact, and rapid alternating movement normal  Gait and Stance   Gait/Posture: station normal, casual gait normal, ambulates independently , tiptoe normal, and steady in Romberg's position with eyes open and closed        /70 (Site: Right Upper Arm, Position: Sitting, Cuff Size: Large Adult)   Pulse 95   Ht 5' 5\" (1.651 m)   Wt 218 lb (98.9 kg)   SpO2 98%   BMI 36.28 kg/m²     Assessment and Plan     Diagnosis Orders   1. Chronic nonintractable headache, unspecified headache type  tiZANidine (ZANAFLEX) 4 MG tablet    butalbital-acetaminophen-caffeine (FIORICET, ESGIC) -40 MG per tablet    Rimegepant Sulfate (NURTEC) 75 MG TBDP        Magalis Shore was seen in neurological follow up in regards to chronic migraine. Magalis Shore is willing to try Tizanidine as a migraine preventative, she will titrate up to 4 mg at bedtime. I refilled her Fiorcet and she understands to only take 2 times per week to avoid rebound headache.  I did give her samples of Nurtec to try before she takes Fiorcet to see if this is effective for her and I will send in a prescription. Medications prescribed for the patient were discussed in detail. This included a discussion of the potential risks vs the potential benefits of the medications. The patient was given time to ask questions and these were answered to the best of my ability. The patient appeared to understand the information provided. We discussed the importance of keeping a detailed headache diary. We discussed trying to identify headache triggers. We discussed the importance of staying well hydrated, eating three regular meals each day, getting plenty of hours of fitful sleep, and reducing stress to help prevent headaches. Return in about 3 months (around 1/10/2023).     Anna Hanson, ANDREW - CNP

## 2022-10-11 ENCOUNTER — TELEMEDICINE (OUTPATIENT)
Dept: FAMILY MEDICINE CLINIC | Age: 42
End: 2022-10-11
Payer: COMMERCIAL

## 2022-10-11 DIAGNOSIS — F41.1 GAD (GENERALIZED ANXIETY DISORDER): ICD-10-CM

## 2022-10-11 DIAGNOSIS — F33.1 MODERATE EPISODE OF RECURRENT MAJOR DEPRESSIVE DISORDER (HCC): Primary | ICD-10-CM

## 2022-10-11 PROBLEM — H69.83 OTHER SPECIFIED DISORDERS OF EUSTACHIAN TUBE, BILATERAL: Status: ACTIVE | Noted: 2021-12-16

## 2022-10-11 PROBLEM — H90.12 CONDUCTIVE HEARING LOSS, UNILATERAL, LEFT EAR, WITH UNRESTRICTED HEARING ON THE CONTRALATERAL SIDE: Status: ACTIVE | Noted: 2021-12-16

## 2022-10-11 PROBLEM — H66.009 ACUTE SUPPURATIVE OTITIS MEDIA WITHOUT SPONTANEOUS RUPTURE OF EAR DRUM, UNSPECIFIED EAR: Status: ACTIVE | Noted: 2021-11-19

## 2022-10-11 PROBLEM — H61.21 IMPACTED CERUMEN, RIGHT EAR: Status: ACTIVE | Noted: 2021-11-05

## 2022-10-11 PROBLEM — H90.11 CONDUCTIVE HEARING LOSS, UNILATERAL, RIGHT EAR, WITH UNRESTRICTED HEARING ON THE CONTRALATERAL SIDE: Status: ACTIVE | Noted: 2022-01-27

## 2022-10-11 PROCEDURE — G8417 CALC BMI ABV UP PARAM F/U: HCPCS | Performed by: FAMILY MEDICINE

## 2022-10-11 PROCEDURE — G8428 CUR MEDS NOT DOCUMENT: HCPCS | Performed by: FAMILY MEDICINE

## 2022-10-11 PROCEDURE — G8484 FLU IMMUNIZE NO ADMIN: HCPCS | Performed by: FAMILY MEDICINE

## 2022-10-11 PROCEDURE — 99214 OFFICE O/P EST MOD 30 MIN: CPT | Performed by: FAMILY MEDICINE

## 2022-10-11 PROCEDURE — 1036F TOBACCO NON-USER: CPT | Performed by: FAMILY MEDICINE

## 2022-10-11 RX ORDER — ALPRAZOLAM 1 MG/1
1 TABLET ORAL NIGHTLY PRN
Qty: 60 TABLET | Refills: 0 | Status: SHIPPED | OUTPATIENT
Start: 2022-10-11 | End: 2022-12-10

## 2022-10-11 RX ORDER — SERTRALINE HYDROCHLORIDE 100 MG/1
TABLET, FILM COATED ORAL
Qty: 60 TABLET | Refills: 1 | Status: SHIPPED | OUTPATIENT
Start: 2022-10-11

## 2022-10-11 NOTE — PROGRESS NOTES
Patrick Sifuentes (:  1980) is a Established patient, here for evaluation of the following:    Assessment & Plan   Below is the assessment and plan developed based on review of pertinent history, physical exam, labs, studies, and medications. 1. Moderate episode of recurrent major depressive disorder (HCC)  -     sertraline (ZOLOFT) 100 MG tablet; TAKE 1 TABLET BY MOUTH EVERY DAY, Disp-60 tablet, R-1Normal  2. RC (generalized anxiety disorder)  -     ALPRAZolam (XANAX) 1 MG tablet; Take 1 tablet by mouth nightly as needed for Anxiety for up to 60 days. , Disp-60 tablet, R-0Normal  -     sertraline (ZOLOFT) 100 MG tablet; TAKE 1 TABLET BY MOUTH EVERY DAY, Disp-60 tablet, R-1Normal  Patient with a history of major depressive disorder and generalized anxiety disorder reports increasing anxiety and lack of energy. Patient reports recent stressor of that starting college. Patient is agreeable to a trial of Xanax due to her recent acute stressor and to continue taking her Zoloft up to 100 mg daily. Return if symptoms worsen or fail to improve. Subjective   Patient is a 57-year-old female who presents to the office via video for increased generalized anxiety disorder and major depressive disorder. Patient reports that she has been taking her medications as prescribed but recently she feels like she has lack of energy unable to wake up in the morning to get stuff done. Patient reports that recently she started taking college classes and that is the only recent stressors in her life. Patient denies suicidal or homicidal ideations. Patient reports that she has been on Xanax while she was in [place] and help with her symptoms. Objective   Patient-Reported Vitals  No data recorded     Physical Exam             Patrick Sifuentes, was evaluated through a synchronous (real-time) audio-video encounter.  The patient (or guardian if applicable) is aware that this is a billable service, which includes applicable co-pays. This Virtual Visit was conducted with patient's (and/or legal guardian's) consent. The visit was conducted pursuant to the emergency declaration under the [facility], [address] [place] [address] authority and the [employer] and [employer] General Act. Patient identification was verified, and a caregiver was present when appropriate. The patient was located at Home: [address] [place] [address]. Provider was located at [facility] [address] [facility]  [phone]  [place],  [address] [facility].         --Flower Alegre MD

## 2022-10-25 ENCOUNTER — TELEPHONE (OUTPATIENT)
Dept: NEUROLOGY | Age: 42
End: 2022-10-25

## 2022-10-25 DIAGNOSIS — R51.9 CHRONIC NONINTRACTABLE HEADACHE, UNSPECIFIED HEADACHE TYPE: Primary | ICD-10-CM

## 2022-10-25 DIAGNOSIS — G89.29 CHRONIC NONINTRACTABLE HEADACHE, UNSPECIFIED HEADACHE TYPE: Primary | ICD-10-CM

## 2022-10-25 RX ORDER — RIMEGEPANT SULFATE 75 MG/75MG
75 TABLET, ORALLY DISINTEGRATING ORAL PRN
Qty: 16 TABLET | Refills: 2 | Status: SHIPPED | OUTPATIENT
Start: 2022-10-25

## 2022-10-25 NOTE — TELEPHONE ENCOUNTER
Manny Molina called and left a voicemail stating all the meds are working well for her. She would like a prescription of Nurtec sent into [facility] in [place].

## 2022-11-03 DIAGNOSIS — F41.1 GAD (GENERALIZED ANXIETY DISORDER): ICD-10-CM

## 2022-11-03 RX ORDER — ALPRAZOLAM 1 MG/1
1 TABLET ORAL NIGHTLY PRN
Qty: 60 TABLET | Refills: 0 | OUTPATIENT
Start: 2022-11-03 | End: 2023-01-02

## 2022-11-03 NOTE — TELEPHONE ENCOUNTER
Pt. Is requesting this to be increased pt. Was advised she would need to see a new Pcp to do this pt. Verbalized understanding but does need her current medication refilled.

## 2022-11-07 DIAGNOSIS — G89.29 CHRONIC NONINTRACTABLE HEADACHE, UNSPECIFIED HEADACHE TYPE: ICD-10-CM

## 2022-11-07 DIAGNOSIS — R51.9 CHRONIC NONINTRACTABLE HEADACHE, UNSPECIFIED HEADACHE TYPE: ICD-10-CM

## 2022-11-07 RX ORDER — TIZANIDINE 4 MG/1
4 TABLET ORAL NIGHTLY
Qty: 30 TABLET | Refills: 3 | Status: CANCELLED | OUTPATIENT
Start: 2022-11-07

## 2022-11-07 RX ORDER — RIMEGEPANT SULFATE 75 MG/75MG
TABLET, ORALLY DISINTEGRATING ORAL
Qty: 4 TABLET | Refills: 0 | Status: CANCELLED | OUTPATIENT
Start: 2022-11-07

## 2022-11-07 RX ORDER — RIMEGEPANT SULFATE 75 MG/75MG
75 TABLET, ORALLY DISINTEGRATING ORAL PRN
Qty: 16 TABLET | Refills: 2 | Status: CANCELLED | OUTPATIENT
Start: 2022-11-07

## 2022-11-09 RX ORDER — BUTALBITAL, ACETAMINOPHEN AND CAFFEINE 50; 325; 40 MG/1; MG/1; MG/1
TABLET ORAL
Qty: 15 TABLET | Refills: 0 | Status: SHIPPED | OUTPATIENT
Start: 2022-11-09 | End: 2022-11-30

## 2022-11-14 ENCOUNTER — APPOINTMENT (OUTPATIENT)
Dept: GENERAL RADIOLOGY | Age: 42
End: 2022-11-14
Payer: COMMERCIAL

## 2022-11-14 ENCOUNTER — HOSPITAL ENCOUNTER (EMERGENCY)
Age: 42
Discharge: HOME | End: 2022-11-14
Attending: EMERGENCY MEDICINE
Payer: COMMERCIAL

## 2022-11-14 VITALS
RESPIRATION RATE: 14 BRPM | OXYGEN SATURATION: 98 % | HEIGHT: 65 IN | BODY MASS INDEX: 36.32 KG/M2 | WEIGHT: 218 LBS | TEMPERATURE: 97.7 F | SYSTOLIC BLOOD PRESSURE: 137 MMHG | DIASTOLIC BLOOD PRESSURE: 79 MMHG | HEART RATE: 71 BPM

## 2022-11-14 DIAGNOSIS — R51.9 NONINTRACTABLE HEADACHE, UNSPECIFIED CHRONICITY PATTERN, UNSPECIFIED HEADACHE TYPE: Primary | ICD-10-CM

## 2022-11-14 LAB
ALBUMIN SERPL-MCNC: 3.8 GM/DL (ref 3.4–5)
ALP BLD-CCNC: 104 IU/L (ref 40–129)
ALT SERPL-CCNC: 23 U/L (ref 10–40)
ANION GAP SERPL CALCULATED.3IONS-SCNC: 10 MMOL/L (ref 4–16)
AST SERPL-CCNC: 17 IU/L (ref 15–37)
BASOPHILS ABSOLUTE: 0.1 K/CU MM
BASOPHILS RELATIVE PERCENT: 0.5 % (ref 0–1)
BILIRUB SERPL-MCNC: 0.3 MG/DL (ref 0–1)
BUN BLDV-MCNC: 14 MG/DL (ref 6–23)
CALCIUM SERPL-MCNC: 8.9 MG/DL (ref 8.3–10.6)
CHLORIDE BLD-SCNC: 102 MMOL/L (ref 99–110)
CO2: 25 MMOL/L (ref 21–32)
CREAT SERPL-MCNC: 0.5 MG/DL (ref 0.6–1.1)
DIFFERENTIAL TYPE: ABNORMAL
EOSINOPHILS ABSOLUTE: 0.6 K/CU MM
EOSINOPHILS RELATIVE PERCENT: 4.4 % (ref 0–3)
GFR SERPL CREATININE-BSD FRML MDRD: >60 ML/MIN/1.73M2
GLUCOSE BLD-MCNC: 92 MG/DL (ref 70–99)
HCT VFR BLD CALC: 41.3 % (ref 37–47)
HEMOGLOBIN: 13.6 GM/DL (ref 12.5–16)
IMMATURE NEUTROPHIL %: 0.7 % (ref 0–0.43)
LYMPHOCYTES ABSOLUTE: 3.7 K/CU MM
LYMPHOCYTES RELATIVE PERCENT: 29 % (ref 24–44)
MCH RBC QN AUTO: 28.9 PG (ref 27–31)
MCHC RBC AUTO-ENTMCNC: 32.9 % (ref 32–36)
MCV RBC AUTO: 87.7 FL (ref 78–100)
MONOCYTES ABSOLUTE: 0.5 K/CU MM
MONOCYTES RELATIVE PERCENT: 3.9 % (ref 0–4)
PDW BLD-RTO: 14.8 % (ref 11.7–14.9)
PLATELET # BLD: 313 K/CU MM (ref 140–440)
PMV BLD AUTO: 10.7 FL (ref 7.5–11.1)
POTASSIUM SERPL-SCNC: 4 MMOL/L (ref 3.5–5.1)
RBC # BLD: 4.71 M/CU MM (ref 4.2–5.4)
SEGMENTED NEUTROPHILS ABSOLUTE COUNT: 7.9 K/CU MM
SEGMENTED NEUTROPHILS RELATIVE PERCENT: 61.5 % (ref 36–66)
SODIUM BLD-SCNC: 137 MMOL/L (ref 135–145)
TOTAL IMMATURE NEUTOROPHIL: 0.09 K/CU MM
TOTAL PROTEIN: 6 GM/DL (ref 6.4–8.2)
TROPONIN T: <0.01 NG/ML
WBC # BLD: 12.8 K/CU MM (ref 4–10.5)

## 2022-11-14 PROCEDURE — 96374 THER/PROPH/DIAG INJ IV PUSH: CPT

## 2022-11-14 PROCEDURE — 99285 EMERGENCY DEPT VISIT HI MDM: CPT

## 2022-11-14 PROCEDURE — 71046 X-RAY EXAM CHEST 2 VIEWS: CPT

## 2022-11-14 PROCEDURE — 80053 COMPREHEN METABOLIC PANEL: CPT

## 2022-11-14 PROCEDURE — 96375 TX/PRO/DX INJ NEW DRUG ADDON: CPT

## 2022-11-14 PROCEDURE — 2580000003 HC RX 258: Performed by: EMERGENCY MEDICINE

## 2022-11-14 PROCEDURE — 84484 ASSAY OF TROPONIN QUANT: CPT

## 2022-11-14 PROCEDURE — 85025 COMPLETE CBC W/AUTO DIFF WBC: CPT

## 2022-11-14 PROCEDURE — 93005 ELECTROCARDIOGRAM TRACING: CPT | Performed by: EMERGENCY MEDICINE

## 2022-11-14 PROCEDURE — 6360000002 HC RX W HCPCS: Performed by: EMERGENCY MEDICINE

## 2022-11-14 RX ORDER — KETOROLAC TROMETHAMINE 15 MG/ML
15 INJECTION, SOLUTION INTRAMUSCULAR; INTRAVENOUS ONCE
Status: COMPLETED | OUTPATIENT
Start: 2022-11-14 | End: 2022-11-14

## 2022-11-14 RX ORDER — DEXAMETHASONE SODIUM PHOSPHATE 10 MG/ML
10 INJECTION, SOLUTION INTRAMUSCULAR; INTRAVENOUS ONCE
Status: COMPLETED | OUTPATIENT
Start: 2022-11-14 | End: 2022-11-14

## 2022-11-14 RX ORDER — 0.9 % SODIUM CHLORIDE 0.9 %
1000 INTRAVENOUS SOLUTION INTRAVENOUS ONCE
Status: COMPLETED | OUTPATIENT
Start: 2022-11-14 | End: 2022-11-14

## 2022-11-14 RX ORDER — METOCLOPRAMIDE HYDROCHLORIDE 5 MG/ML
10 INJECTION INTRAMUSCULAR; INTRAVENOUS ONCE
Status: COMPLETED | OUTPATIENT
Start: 2022-11-14 | End: 2022-11-14

## 2022-11-14 RX ORDER — DIPHENHYDRAMINE HYDROCHLORIDE 50 MG/ML
25 INJECTION INTRAMUSCULAR; INTRAVENOUS ONCE
Status: COMPLETED | OUTPATIENT
Start: 2022-11-14 | End: 2022-11-14

## 2022-11-14 RX ADMIN — DIPHENHYDRAMINE HYDROCHLORIDE 25 MG: 50 INJECTION, SOLUTION INTRAMUSCULAR; INTRAVENOUS at 21:00

## 2022-11-14 RX ADMIN — KETOROLAC TROMETHAMINE 15 MG: 15 INJECTION, SOLUTION INTRAMUSCULAR; INTRAVENOUS at 22:02

## 2022-11-14 RX ADMIN — DEXAMETHASONE SODIUM PHOSPHATE 10 MG: 10 INJECTION, SOLUTION INTRAMUSCULAR; INTRAVENOUS at 21:01

## 2022-11-14 RX ADMIN — METOCLOPRAMIDE 10 MG: 5 INJECTION, SOLUTION INTRAMUSCULAR; INTRAVENOUS at 21:01

## 2022-11-14 RX ADMIN — SODIUM CHLORIDE 1000 ML: 9 INJECTION, SOLUTION INTRAVENOUS at 20:59

## 2022-11-14 ASSESSMENT — PAIN DESCRIPTION - LOCATION: LOCATION: HEAD

## 2022-11-14 ASSESSMENT — PAIN SCALES - GENERAL: PAINLEVEL_OUTOF10: 4

## 2022-11-14 ASSESSMENT — HEART SCORE: ECG: 0

## 2022-11-15 NOTE — ED PROVIDER NOTES
Emergency Department Encounter  Location: [address]    Patient: Beverly Crowell  MRN: [id number]  : 1980  Date of evaluation: 2022  ED Provider: Jackie Tinoco DO    Chief Complaint:    Migraine (Since last night pt also has nausea. Pt reports chest pain x 1 month )    [place]:  Beverly Crowell is a 38 y.o. female that presents to the emergency department with concern for headaches. Patient states she has had migraine headaches since adolescence. Describes that she gets them fairly frequently but is typically able to control them at home. Reports that this began last night and gradually intensified despite the fact that she took her Fioricet, tizanidine, and Nurtec. Reports some associated nausea and vomiting. Has not been able to eat and drink today due to the persistent nausea. No acute vision changes. No associated fever, chills, cough or congestion. No recent trauma. Patient also mentions that she has had intermittent chest pain for the past month, last episode earlier today. States she is not able to identify any modifying factors. Describes that its nonradiating, left-sided. Associated with nausea and occasionally diaphoresis. No associated dyspnea. No recent travel, hospitalization, or surgery    ROS:  At least 10 systems reviewed and are acutely negative unless otherwise noted in the HPI. Past Medical History:   Diagnosis Date    Headache     migraines    Lumbar sprain      Past Surgical History:   Procedure Laterality Date    ABDOMINAL EXPLORATION SURGERY      BUNIONECTOMY Right     EAR SURGERY       History reviewed. No pertinent family history.   Social History     Socioeconomic History    Marital status:      Spouse name: Not on file    Number of children: Not on file    Years of education: Not on file    Highest education level: Not on file   Occupational History    Not on file   Tobacco Use    Smoking status: Every Day     Packs/day: 0.50     Years: 24.00     Pack years: 12.00     Types: Cigarettes     Last attempt to quit: 10/20/2021     Years since quittin.0    Smokeless tobacco: Never   Vaping Use    Vaping Use: Never used   Substance and Sexual Activity    Alcohol use: Yes     Comment: Rare    Drug use: Never    Sexual activity: Not on file   Other Topics Concern    Not on file   Social History Narrative    Not on file     Social Determinants of Health     Financial Resource Strain: Not on file   Food Insecurity: Not on file   Transportation Needs: Not on file   Physical Activity: Not on file   Stress: Not on file   Social Connections: Not on file   Intimate Partner Violence: Not on file   Housing Stability: Not on file     No current facility-administered medications for this encounter. Current Outpatient Medications   Medication Sig Dispense Refill    amoxicillin (AMOXIL) 875 MG tablet Take 1 tablet by mouth 2 times daily for 10 days 20 tablet 0    promethazine (PHENERGAN) 12.5 MG tablet Take 1 tablet by mouth 4 times daily as needed for Nausea 20 tablet 0    ciprofloxacin-hydrocortisone (CIPRO HC) 0.2-1 % otic suspension Place 3 drops into the right ear 2 times daily for 7 days 10 mL 0    butalbital-acetaminophen-caffeine (FIORICET, ESGIC) -40 MG per tablet TAKE 1 TABLET BY MOUTH EVERY 6 HOURS AS NEEDED FOR MIGRAINE, MAX 4 IN 24 HOURS 15 tablet 0    Rimegepant Sulfate (NURTEC) 75 MG TBDP Take 75 mg by mouth as needed (Migraine) Not to exceed more than 1 tab in 24 hours 16 tablet 2    ALPRAZolam (XANAX) 1 MG tablet Take 1 tablet by mouth nightly as needed for Anxiety for up to 60 days. 60 tablet 0    sertraline (ZOLOFT) 100 MG tablet TAKE 1 TABLET BY MOUTH EVERY DAY 60 tablet 1    tiZANidine (ZANAFLEX) 4 MG tablet Take 1 tablet by mouth nightly Take 1/2 tab nightly for 8 nights then increase to full tab.  30 tablet 3    Rimegepant Sulfate (NURTEC) 75 MG TBDP LOT# [id number] EXP:  4 tablet 0    ibuprofen (ADVIL;MOTRIN) 600 MG tablet Take 1 tablet by mouth 3 times daily as needed for Pain 30 tablet 5    hydrOXYzine HCl (ATARAX) 50 MG tablet Take 1 tablet by mouth every 8 hours as needed for Itching 180 tablet 3    albuterol sulfate HFA (VENTOLIN HFA) 108 (90 Base) MCG/ACT inhaler Inhale 2 puffs into the lungs 4 times daily as needed for Wheezing (Patient taking differently: Inhale 2 puffs into the lungs 4 times daily as needed for Wheezing prn) 18 g 0     No Known Allergies    Nursing Notes Reviewed    Physical Exam:  ED Triage Vitals [11/14/22 2026]   Enc Vitals Group      /79      Heart Rate 82      Resp 19      Temp 97.7 °F (36.5 °C)      Temp Source Oral      SpO2 98 %      Weight 218 lb (98.9 kg)      Height 5' 5\" (1.651 m)      Head Circumference       Peak Flow       Pain Score       Pain Loc       Pain Edu? Excl. in [address]? GENERAL APPEARANCE: Awake and alert. Cooperative. No acute distress. Well-appearing. HEAD: Normocephalic. Atraumatic. EYES: EOM's grossly intact. Sclera anicteric. ENT: Tolerates saliva. No trismus. NECK: Supple. Trachea midline. CARDIO: RRR. Radial pulse 2+. LUNGS: Respirations unlabored. CTAB. ABDOMEN: Soft. Non-distended. Non-tender. EXTREMITIES: No acute deformities. No lower extremity tenderness, edema or asymmetry  SKIN: Warm and dry. NEUROLOGICAL: Patient is alert and oriented with clear speech and mentation. CN 2-12 are grossly intact. Symmetric motor strength and intact sensation in all extremities. No dysmetria or neglect. PSYCHIATRIC: Normal mood.      Labs:  Results for orders placed or performed during the hospital encounter of 11/14/22   CBC with Auto Differential   Result Value Ref Range    WBC 12.8 (H) 4.0 - 10.5 K/CU MM    RBC 4.71 4.2 - 5.4 M/CU MM    Hemoglobin 13.6 12.5 - 16.0 GM/DL    Hematocrit 41.3 37 - 47 %    MCV 87.7 78 - 100 FL    MCH 28.9 27 - 31 PG    MCHC 32.9 32.0 - 36.0 %    RDW 14.8 11.7 - 14.9 %    Platelets 533 151 - 379 K/CU MM    MPV 10.7 7.5 - 11.1 FL    Differential Type AUTOMATED DIFFERENTIAL     Segs Relative 61.5 36 - 66 %    Lymphocytes % 29.0 24 - 44 %    Monocytes % 3.9 0 - 4 %    Eosinophils % 4.4 (H) 0 - 3 %    Basophils % 0.5 0 - 1 %    Segs Absolute 7.9 K/CU MM    Lymphocytes Absolute 3.7 K/CU MM    Monocytes Absolute 0.5 K/CU MM    Eosinophils Absolute 0.6 K/CU MM    Basophils Absolute 0.1 K/CU MM    Immature Neutrophil % 0.7 (H) 0 - 0.43 %    Total Immature Neutrophil 0.09 K/CU MM   Comprehensive Metabolic Panel w/ Reflex to MG   Result Value Ref Range    Sodium 137 135 - 145 MMOL/L    Potassium 4.0 3.5 - 5.1 MMOL/L    Chloride 102 99 - 110 mMol/L    CO2 25 21 - 32 MMOL/L    BUN 14 6 - 23 MG/DL    Creatinine 0.5 (L) 0.6 - 1.1 MG/DL    Est, Glom Filt Rate >60 >60 mL/min/1.73m2    Glucose 92 70 - 99 MG/DL    Calcium 8.9 8.3 - 10.6 MG/DL    Albumin 3.8 3.4 - 5.0 GM/DL    Total Protein 6.0 (L) 6.4 - 8.2 GM/DL    Total Bilirubin 0.3 0.0 - 1.0 MG/DL    ALT 23 10 - 40 U/L    AST 17 15 - 37 IU/L    Alkaline Phosphatase 104 40 - 129 IU/L    Anion Gap 10 4 - 16   Troponin   Result Value Ref Range    Troponin T <0.010 <0.01 NG/ML   EKG 12 Lead   Result Value Ref Range    Ventricular Rate 75 BPM    Atrial Rate 75 BPM    P-R Interval 166 ms    QRS Duration 82 ms    Q-T Interval 388 ms    QTc Calculation (Bazett) 433 ms    P Axis 20 degrees    R Axis 33 degrees    T Axis 31 degrees    Diagnosis       Normal sinus rhythm  Normal ECG  No previous ECGs available  Confirmed by [facility] Ally SNOWDEN ([id number]) on 11/16/2022 3:30:34 PM         EKG (if obtained): (All EKG's are interpreted by myself in the absence of a cardiologist)  NSR @  75. Normal axis with good R wave progression. No ST elevation or depression. No ectopy. No prior tracing available for comparison.     Radiographs (if obtained):  [] The following radiograph was interpreted by myself in the absence of a radiologist:  [x] Radiologist's Report reviewed at time of ED visit:  XR CHEST (2 VW)    Result Date: 11/14/2022  EXAMINATION: TWO XRAY VIEWS OF THE CHEST 11/14/2022 8:53 pm COMPARISON: 10/29/2021 HISTORY: ORDERING SYSTEM PROVIDED HISTORY: Chest pain TECHNOLOGIST PROVIDED HISTORY: Reason for exam:->Chest pain Reason for Exam: Chest pain Additional signs and symptoms: Chest pain FINDINGS: Heart size is normal  Aorta is normal.  Lungs are under mild elevation of the right hemidiaphragm. Expanded and clear. No pleural effusions. Osseous structures are unremarkable. Lungs are clear        ED Course and MDM:  Patient reports a long history of headaches and states this is similar to prior episodes. No further workup done for the HA for that reason. Patient is given reglan, benadryl, decadron, and IVF with significant improvement although not resolution. She is given toradol IV. In the meantime, EKG, labs, and CXR are reviewed due to report of intermittent chest pain. These are reassuring. Low suspicion for ACS: HEART score is 1. I think patient is appropriate for outpatient management. Patient is given instructions regarding symptomatic care at home as well as return precautions. To call PCP for follow up in 2-3 days. Patient verbalizes understanding of all instructions and is comfortable with the plan of care. Final Impression:  1.  Nonintractable headache, unspecified chronicity pattern, unspecified headache type      DISPOSITION Decision To Discharge 11/14/2022 10:17:10 PM      Patient referred to:  [id number] [address] [address]  [phone]  Schedule an appointment as soon as possible for a visit in 2 days      [facility]  [place] [address]  [phone]    If symptoms worsen  Discharge medications:  Discharge Medication List as of 11/14/2022 10:09 PM        (Please note that portions of this note may have been completed with a voice recognition program. Efforts were made to edit the dictations but occasionally words are mis-transcribed.)    DO Henri Medina DO  11/23/22 0370

## 2022-11-16 LAB
EKG ATRIAL RATE: 75 BPM
EKG DIAGNOSIS: NORMAL
EKG P AXIS: 20 DEGREES
EKG P-R INTERVAL: 166 MS
EKG Q-T INTERVAL: 388 MS
EKG QRS DURATION: 82 MS
EKG QTC CALCULATION (BAZETT): 433 MS
EKG R AXIS: 33 DEGREES
EKG T AXIS: 31 DEGREES
EKG VENTRICULAR RATE: 75 BPM

## 2022-11-16 PROCEDURE — 93010 ELECTROCARDIOGRAM REPORT: CPT | Performed by: INTERNAL MEDICINE

## 2022-11-18 ENCOUNTER — OFFICE VISIT (OUTPATIENT)
Dept: INTERNAL MEDICINE CLINIC | Age: 42
End: 2022-11-18
Payer: COMMERCIAL

## 2022-11-18 VITALS
DIASTOLIC BLOOD PRESSURE: 76 MMHG | BODY MASS INDEX: 36.32 KG/M2 | OXYGEN SATURATION: 97 % | SYSTOLIC BLOOD PRESSURE: 132 MMHG | HEART RATE: 86 BPM | HEIGHT: 65 IN | WEIGHT: 218 LBS

## 2022-11-18 DIAGNOSIS — R11.0 NAUSEA: ICD-10-CM

## 2022-11-18 DIAGNOSIS — R09.82 POSTNASAL DRIP: ICD-10-CM

## 2022-11-18 DIAGNOSIS — G43.809 OTHER MIGRAINE WITHOUT STATUS MIGRAINOSUS, NOT INTRACTABLE: Primary | ICD-10-CM

## 2022-11-18 DIAGNOSIS — R07.9 CHEST PAIN, UNSPECIFIED TYPE: ICD-10-CM

## 2022-11-18 DIAGNOSIS — H61.21 IMPACTED CERUMEN OF RIGHT EAR: ICD-10-CM

## 2022-11-18 PROCEDURE — 96372 THER/PROPH/DIAG INJ SC/IM: CPT | Performed by: PHYSICIAN ASSISTANT

## 2022-11-18 PROCEDURE — 99214 OFFICE O/P EST MOD 30 MIN: CPT | Performed by: PHYSICIAN ASSISTANT

## 2022-11-18 PROCEDURE — G8428 CUR MEDS NOT DOCUMENT: HCPCS | Performed by: PHYSICIAN ASSISTANT

## 2022-11-18 PROCEDURE — 69209 REMOVE IMPACTED EAR WAX UNI: CPT | Performed by: PHYSICIAN ASSISTANT

## 2022-11-18 PROCEDURE — 4004F PT TOBACCO SCREEN RCVD TLK: CPT | Performed by: PHYSICIAN ASSISTANT

## 2022-11-18 PROCEDURE — G8417 CALC BMI ABV UP PARAM F/U: HCPCS | Performed by: PHYSICIAN ASSISTANT

## 2022-11-18 PROCEDURE — G8484 FLU IMMUNIZE NO ADMIN: HCPCS | Performed by: PHYSICIAN ASSISTANT

## 2022-11-18 RX ORDER — CIPROFLOXACIN/HYDROCORTISONE 0.2 %-1 %
3 SUSPENSION, DROPS(FINAL DOSAGE FORM)(ML) OTIC (EAR) 2 TIMES DAILY
Qty: 10 ML | Refills: 0 | Status: SHIPPED | OUTPATIENT
Start: 2022-11-18 | End: 2022-11-25

## 2022-11-18 RX ORDER — KETOROLAC TROMETHAMINE 30 MG/ML
30 INJECTION, SOLUTION INTRAMUSCULAR; INTRAVENOUS ONCE
Status: SHIPPED | OUTPATIENT
Start: 2022-11-18 | End: 2022-11-23

## 2022-11-18 RX ORDER — KETOROLAC TROMETHAMINE 30 MG/ML
30 INJECTION, SOLUTION INTRAMUSCULAR; INTRAVENOUS ONCE
Status: COMPLETED | OUTPATIENT
Start: 2022-11-18 | End: 2022-11-18

## 2022-11-18 RX ORDER — AMOXICILLIN 875 MG/1
875 TABLET, COATED ORAL 2 TIMES DAILY
Qty: 20 TABLET | Refills: 0 | Status: SHIPPED | OUTPATIENT
Start: 2022-11-18 | End: 2022-11-28

## 2022-11-18 RX ORDER — PROMETHAZINE HYDROCHLORIDE 12.5 MG/1
12.5 TABLET ORAL 4 TIMES DAILY PRN
Qty: 20 TABLET | Refills: 0 | Status: SHIPPED | OUTPATIENT
Start: 2022-11-18 | End: 2022-11-25

## 2022-11-18 RX ADMIN — KETOROLAC TROMETHAMINE 30 MG: 30 INJECTION, SOLUTION INTRAMUSCULAR; INTRAVENOUS at 09:14

## 2022-11-18 ASSESSMENT — ENCOUNTER SYMPTOMS
WHEEZING: 0
ABDOMINAL PAIN: 0
EYE PAIN: 0
VOMITING: 0
RHINORRHEA: 0
EYE REDNESS: 0
COUGH: 0
PHOTOPHOBIA: 0
CHEST TIGHTNESS: 0
NAUSEA: 1
DIARRHEA: 0
BLOOD IN STOOL: 0
SORE THROAT: 0
CONSTIPATION: 0
BACK PAIN: 0
SHORTNESS OF BREATH: 0
EYE DISCHARGE: 0
COLOR CHANGE: 0

## 2022-11-18 NOTE — PROGRESS NOTES
Negrito Vivar (:  1980) is a 38 y.o. female,Established patient, here for evaluation of the following chief complaint(s):    Follow-Up from Hospital (Still having headache and chest pains)      SUBJECTIVE/OBJECTIVE:  HPI  Negrito Vivar is a pleasant 38 y.o. female presenting to clinic today for migraine headache/chest pain. Patient was seen emergency department on 2022; patient reports severe migraine with nausea and vomiting; patient also reports intermittent chest pains over the past month which seem to come and go at random, sometimes last throughout the day and symptoms are brief; patient does report increase in stress and anxiety over the past month; patient reports she has been taking Nurtec and Fioricet as needed; reports these medications temporary provide modest improvement in headache however headache comes back worse; patient reports headache does feel slightly different than prior migraines; reports it is mostly on the right side however does occasionally move around to occipital region etc.; patient reports she also did significantly cut back on her caffeine intake, was previously drinking 4-5 monsters a day and now is only having a cup of coffee in the morning. Patient reports he takes muscle relaxer at nighttime, does sleep well. Patient also reports he typically has more migraines with weather changes; patient reports he has been taking acetaminophen and ibuprofen which has not helped. Patient reports no relief with Xanax. Patient denies shortness of breath, lightheadedness or dizziness; patient does have prior history of cholesteatoma and cerumen impaction. Patient reports that his right ear does bother a little bit intermittently. Patient reports she does have some postnasal drip and congestion, reports she did feel slightly cold before onset of headache symptoms.         No Known Allergies    Current Outpatient Medications   Medication Sig Dispense Refill    amoxicillin (AMOXIL) 875 MG tablet Take 1 tablet by mouth 2 times daily for 10 days 20 tablet 0    promethazine (PHENERGAN) 12.5 MG tablet Take 1 tablet by mouth 4 times daily as needed for Nausea 20 tablet 0    ciprofloxacin-hydrocortisone (CIPRO HC) 0.2-1 % otic suspension Place 3 drops into the right ear 2 times daily for 7 days 10 mL 0    butalbital-acetaminophen-caffeine (FIORICET, ESGIC) -40 MG per tablet TAKE 1 TABLET BY MOUTH EVERY 6 HOURS AS NEEDED FOR MIGRAINE, MAX 4 IN 24 HOURS 15 tablet 0    Rimegepant Sulfate (NURTEC) 75 MG TBDP Take 75 mg by mouth as needed (Migraine) Not to exceed more than 1 tab in 24 hours 16 tablet 2    ALPRAZolam (XANAX) 1 MG tablet Take 1 tablet by mouth nightly as needed for Anxiety for up to 60 days. 60 tablet 0    sertraline (ZOLOFT) 100 MG tablet TAKE 1 TABLET BY MOUTH EVERY DAY 60 tablet 1    tiZANidine (ZANAFLEX) 4 MG tablet Take 1 tablet by mouth nightly Take 1/2 tab nightly for 8 nights then increase to full tab. 30 tablet 3    Rimegepant Sulfate (NURTEC) 75 MG TBDP LOT# [id number] EXP: 2/25 4 tablet 0    ibuprofen (ADVIL;MOTRIN) 600 MG tablet Take 1 tablet by mouth 3 times daily as needed for Pain 30 tablet 5    hydrOXYzine HCl (ATARAX) 50 MG tablet Take 1 tablet by mouth every 8 hours as needed for Itching 180 tablet 3    albuterol sulfate HFA (VENTOLIN HFA) 108 (90 Base) MCG/ACT inhaler Inhale 2 puffs into the lungs 4 times daily as needed for Wheezing (Patient taking differently: Inhale 2 puffs into the lungs 4 times daily as needed for Wheezing prn) 18 g 0     Current Facility-Administered Medications   Medication Dose Route Frequency Provider Last Rate Last Admin    ketorolac (TORADOL) injection 30 mg  30 mg IntraMUSCular Once [employer]           /76   Pulse 86   Ht 5' 5\" (1.651 m)   Wt 218 lb (98.9 kg)   SpO2 97%   BMI 36.28 kg/m²     Review of Systems   Constitutional:  Negative for appetite change, chills, fatigue and fever.    HENT:  Positive for ear pain and postnasal drip. Negative for congestion, hearing loss, rhinorrhea and sore throat. Eyes:  Negative for photophobia, pain, discharge and redness. Respiratory:  Negative for cough, chest tightness, shortness of breath and wheezing. Cardiovascular:  Negative for chest pain, palpitations and leg swelling. Gastrointestinal:  Positive for nausea. Negative for abdominal pain, blood in stool, constipation, diarrhea and vomiting. Endocrine: Negative for polyuria. Genitourinary:  Negative for difficulty urinating, dysuria, flank pain, frequency, hematuria and urgency. Musculoskeletal:  Negative for arthralgias, back pain, gait problem and joint swelling. Skin:  Negative for color change and rash. Neurological:  Positive for headaches. Negative for dizziness, syncope, weakness and light-headedness. Hematological:  Negative for adenopathy. Psychiatric/Behavioral:  Negative for agitation, behavioral problems and suicidal ideas. The patient is not nervous/anxious. Physical Exam  HENT:      Head: Normocephalic and atraumatic. Comments: Right-sided frontal tenderness to palpation     Right Ear: External ear normal.      Left Ear: External ear normal.      Ears:      Comments: Right cerumen impaction, serous effusion behind TM, bulging, no erythema  Eyes:      Extraocular Movements: Extraocular movements intact. Pupils: Pupils are equal, round, and reactive to light. Cardiovascular:      Rate and Rhythm: Regular rhythm. Pulses: Normal pulses. Pulmonary:      Effort: Pulmonary effort is normal. No respiratory distress. Breath sounds: Normal breath sounds. Abdominal:      General: Abdomen is flat. Palpations: Abdomen is soft. Musculoskeletal:         General: Normal range of motion. Skin:     General: Skin is warm and dry. Neurological:      General: No focal deficit present. Mental Status: She is alert and oriented to person, place, and time.  Mental status is at baseline. Psychiatric:         Behavior: Behavior normal.       ASSESSMENT/PLAN:  1. Other migraine without status migrainosus, not intractable   -Likely multifactorial in origin, weather changes, caffeine changes, possible sinus infection contributing; trial antibiotic, Toradol given in office today, can take Phenergan at home for nausea; can continue with as needed Fioricet or Nurtec however should only take when absolutely needed to prevent rebound issues etc.  Recommend reinitiation of sinus irrigation and Flonase. Referral placed to cardiology for consideration of stress test etc.  Possibly related to stress etc. Return to clinic or report to emergency department if symptoms worsen, change, persist.  -     ketorolac (TORADOL) injection 30 mg; 30 mg, IntraMUSCular, ONCE, 1 dose, On Fri 11/18/22 at 0900Do not administer for more than 5 days. -     amoxicillin (AMOXIL) 875 MG tablet; Take 1 tablet by mouth 2 times daily for 10 days, Disp-20 tablet, R-0Normal  2. Nausea  -     promethazine (PHENERGAN) 12.5 MG tablet; Take 1 tablet by mouth 4 times daily as needed for Nausea, Disp-20 tablet, R-0Normal  3. Postnasal drip  -     amoxicillin (AMOXIL) 875 MG tablet; Take 1 tablet by mouth 2 times daily for 10 days, Disp-20 tablet, R-0Normal  4. Impacted cerumen of right ear   -Irrigation was performed, cerumen was loosened however not completely removed, earwax removal tool was used to remove remainder of earwax, very small amount of bleeding with surrounding erythema noted after removal; bleeding stopped shortly after; patient tolerated this well and does report some relief of symptoms after; denies significant pain or dizziness or hearing changes; trial Cipro drops; reviewed proper use, monitoring, side effects of medication sent in.  -     AL REMOVAL IMPACTED CERUMEN IRRIGATION/LVG UNILAT  5.  Chest pain, unspecified type  [facility], Olya ludwig    Return in about 1 week (around 11/25/2022), or if symptoms worsen or fail to improve, for Follow Up. An electronic signature was used to authenticate this note.     --KY Fleming

## 2022-11-18 NOTE — LETTER
[address] [place]  [address]  Phone: [phone]  Fax: [phone]    Ember Gavin        November 18, 2022     Patient: Yuriy Vogt   YOB: 1980   Date of Visit: 11/18/2022       To Whom It May Concern: It is my medical opinion that Yuriy Vogt was seen in clinic today. If you have any questions or concerns, please don't hesitate to call.     Sincerely,        Jace Braun PA-C

## 2022-11-21 ENCOUNTER — PATIENT MESSAGE (OUTPATIENT)
Dept: INTERNAL MEDICINE CLINIC | Age: 42
End: 2022-11-21

## 2022-11-21 NOTE — TELEPHONE ENCOUNTER
Patient returned our call and was advised of provider's recommendations.  Patient expressed verbal understanding

## 2022-11-21 NOTE — TELEPHONE ENCOUNTER
Please advise it may take longer for the antibiotics to work. Please continue current treatment. Return to clinic or ER for any new , persistent or worsening symptoms.

## 2022-11-22 ENCOUNTER — TELEPHONE (OUTPATIENT)
Dept: CARDIOLOGY CLINIC | Age: 42
End: 2022-11-22

## 2022-11-28 ENCOUNTER — OFFICE VISIT (OUTPATIENT)
Dept: INTERNAL MEDICINE CLINIC | Age: 42
End: 2022-11-28
Payer: COMMERCIAL

## 2022-11-28 VITALS
TEMPERATURE: 97.2 F | WEIGHT: 218 LBS | BODY MASS INDEX: 36.32 KG/M2 | OXYGEN SATURATION: 98 % | HEART RATE: 90 BPM | HEIGHT: 65 IN

## 2022-11-28 DIAGNOSIS — R05.1 ACUTE COUGH: Primary | ICD-10-CM

## 2022-11-28 PROCEDURE — 4004F PT TOBACCO SCREEN RCVD TLK: CPT | Performed by: PHYSICIAN ASSISTANT

## 2022-11-28 PROCEDURE — G8484 FLU IMMUNIZE NO ADMIN: HCPCS | Performed by: PHYSICIAN ASSISTANT

## 2022-11-28 PROCEDURE — 99213 OFFICE O/P EST LOW 20 MIN: CPT | Performed by: PHYSICIAN ASSISTANT

## 2022-11-28 PROCEDURE — G8417 CALC BMI ABV UP PARAM F/U: HCPCS | Performed by: PHYSICIAN ASSISTANT

## 2022-11-28 PROCEDURE — G8428 CUR MEDS NOT DOCUMENT: HCPCS | Performed by: PHYSICIAN ASSISTANT

## 2022-11-28 RX ORDER — METHYLPREDNISOLONE 4 MG/1
TABLET ORAL
Qty: 1 KIT | Refills: 0 | Status: SHIPPED | OUTPATIENT
Start: 2022-11-28 | End: 2022-12-04

## 2022-11-28 RX ORDER — AZITHROMYCIN 250 MG/1
250 TABLET, FILM COATED ORAL SEE ADMIN INSTRUCTIONS
Qty: 6 TABLET | Refills: 0 | Status: SHIPPED | OUTPATIENT
Start: 2022-11-28 | End: 2022-12-03

## 2022-11-28 RX ORDER — BENZONATATE 200 MG/1
200 CAPSULE ORAL 3 TIMES DAILY PRN
Qty: 30 CAPSULE | Refills: 0 | Status: SHIPPED | OUTPATIENT
Start: 2022-11-28 | End: 2022-12-05

## 2022-11-28 RX ORDER — GUAIFENESIN 600 MG/1
600 TABLET, EXTENDED RELEASE ORAL 2 TIMES DAILY
Qty: 30 TABLET | Refills: 0 | Status: SHIPPED | OUTPATIENT
Start: 2022-11-28 | End: 2022-12-13

## 2022-11-28 RX ORDER — DEXTROMETHORPHAN HYDROBROMIDE AND PROMETHAZINE HYDROCHLORIDE 15; 6.25 MG/5ML; MG/5ML
5 SYRUP ORAL NIGHTLY PRN
Qty: 240 ML | Refills: 0 | Status: SHIPPED | OUTPATIENT
Start: 2022-11-28 | End: 2022-12-05

## 2022-11-28 NOTE — PROGRESS NOTES
11/28/22  Suresh Sports  1980    FLU/COVID-19 CLINIC EVALUATION    HPI SYMPTOMS:  Suresh Ardon is a pleasant 38 y.o. female presenting to clinic today for URI/cough. Patient reports ongoing symptoms over the past approximate 10 days without improvement with amoxicillin; patient reports that she feels as if the symptoms have \"settled into her chest patient reports ongoing chest congestion and cough productive of purulent sputum; patient reports COVID test at home was negative. Patient reports he does continue to have migraine and this has not improved with antibiotic as prescribed for possible sinus infection. Employer:    [] Fevers  [] Chills  [x] Cough  [] Coughing up blood  [x] Chest Congestion  [] Nasal Congestion  [] Feeling short of breath  [] Sometimes  [] Frequently  [] All the time  [x] Chest pain  [x] Headaches  []Tolerable  [] Severe  [] Sore throat  [x] Muscle aches  [] Nausea  [x] Vomiting  []Unable to keep fluids down  [x] Diarrhea  []Severe    [x] OTHER SYMPTOMS:  fatigue no better. Meds did not help    Symptom Duration:   [] 1  [] 2   [] 3   [] 4    [] 5   [] 6   [] 7   [] 8   [] 9   [] 10   [] 11   [] 12   [] 13   [x] 14   [] Longer than 14 days    Symptom course:   [x] Worsening     [] Stable     [] Improving    RISK FACTORS:    [] Pregnant or possibly pregnant  [] Age over 61  [] Diabetes  [] Heart disease  [] Asthma  [] COPD/Other chronic lung diseases  [] Active Cancer  [] On Chemotherapy  [] Taking oral steroids  [] History Lymphoma/Leukemia  [] Close contact with a lab confirmed COVID-19 patient within 14 days of symptom onset  [] History of travel from affected geographical areas within 14 days of symptom onset       VITALS:  Vitals:    11/28/22 1423   Pulse: 90   Temp: 97.2 °F (36.2 °C)   SpO2: 98%   Weight: 218 lb (98.9 kg)   Height: 5' 5\" (1.651 m)      Physical Exam  HENT:      Head: Normocephalic and atraumatic.       Comments: Right-sided frontal tenderness to palpation Right Ear: External ear normal.      Left Ear: External ear normal.      Ears:      Comments: Right cerumen impaction, serous effusion behind TM, bulging, no erythema  Eyes:      Extraocular Movements: Extraocular movements intact. Pupils: Pupils are equal, round, and reactive to light. Cardiovascular:      Rate and Rhythm: Regular rhythm. Pulses: Normal pulses. Pulmonary:      Effort: Pulmonary effort is normal. No respiratory distress. Breath sounds: Normal breath sounds. Abdominal:      General: Abdomen is flat. Palpations: Abdomen is soft. Musculoskeletal:         General: Normal range of motion. Skin:     General: Skin is warm and dry. Neurological:      General: No focal deficit present. Mental Status: She is alert and oriented to person, place, and time. Mental status is at baseline. Psychiatric:         Behavior: Behavior normal.     ASSESSMENT/PLAN:  1. Acute cough  Likely secondary to bronchitis, lung sounds are clear; patient is in no acute distress; trial treatment with antibiotic, steroid, cough suppressant as needed, Mucinex, albuterol inhaler as needed; reviewed proper use, monitoring, side effects of medication sent in. Return to clinic or report to emergency department if symptoms worsen, change, persist.  - azithromycin (ZITHROMAX) 250 MG tablet; Take 1 tablet by mouth See Admin Instructions for 5 days 500mg on day 1 followed by 250mg on days 2 - 5  Dispense: 6 tablet; Refill: 0  - guaiFENesin (MUCINEX) 600 MG extended release tablet; Take 1 tablet by mouth 2 times daily for 15 days  Dispense: 30 tablet; Refill: 0  - methylPREDNISolone (MEDROL DOSEPACK) 4 MG tablet; Take by mouth. Dispense: 1 kit; Refill: 0  - promethazine-dextromethorphan (PROMETHAZINE-DM) 6.25-15 MG/5ML syrup; Take 5 mLs by mouth nightly as needed for Cough  Dispense: 240 mL; Refill: 0  - benzonatate (TESSALON) 200 MG capsule;  Take 1 capsule by mouth 3 times daily as needed for Cough Dispense: 30 capsule; Refill: 0        An  electronic signature was used to authenticate this note.      --KY Arita on 11/28/2022 at 3:05 PM

## 2022-11-29 DIAGNOSIS — G89.29 CHRONIC NONINTRACTABLE HEADACHE, UNSPECIFIED HEADACHE TYPE: ICD-10-CM

## 2022-11-29 DIAGNOSIS — R51.9 CHRONIC NONINTRACTABLE HEADACHE, UNSPECIFIED HEADACHE TYPE: ICD-10-CM

## 2022-11-30 RX ORDER — BUTALBITAL, ACETAMINOPHEN AND CAFFEINE 50; 325; 40 MG/1; MG/1; MG/1
TABLET ORAL
Qty: 15 TABLET | Refills: 0 | Status: SHIPPED | OUTPATIENT
Start: 2022-11-30

## 2022-11-30 NOTE — TELEPHONE ENCOUNTER
Patient is due back on 1/16/23, please refill med to at least hold her over until then. Requested Prescriptions     Pending Prescriptions Disp Refills    butalbital-acetaminophen-caffeine (FIORICET, ESGIC) -40 MG per tablet [Pharmacy Med Name: [id number] -40] 15 tablet 0     Sig: TAKE 1 TABLET BY MOUTH EVERY 6 HOURS AS NEEDED FOR MIGRAINE.  MAX OF 4 PER DAY

## 2022-12-20 ENCOUNTER — OFFICE VISIT (OUTPATIENT)
Dept: FAMILY MEDICINE CLINIC | Age: 42
End: 2022-12-20
Payer: COMMERCIAL

## 2022-12-20 VITALS
DIASTOLIC BLOOD PRESSURE: 80 MMHG | OXYGEN SATURATION: 98 % | SYSTOLIC BLOOD PRESSURE: 132 MMHG | BODY MASS INDEX: 39.07 KG/M2 | HEART RATE: 70 BPM | RESPIRATION RATE: 18 BRPM | TEMPERATURE: 98.2 F | WEIGHT: 234.8 LBS

## 2022-12-20 DIAGNOSIS — F33.2 SEVERE EPISODE OF RECURRENT MAJOR DEPRESSIVE DISORDER, WITHOUT PSYCHOTIC FEATURES (HCC): Primary | ICD-10-CM

## 2022-12-20 DIAGNOSIS — F41.9 ANXIETY: ICD-10-CM

## 2022-12-20 DIAGNOSIS — Z02.83 ENCOUNTER FOR DRUG SCREENING: ICD-10-CM

## 2022-12-20 DIAGNOSIS — R51.9 CHRONIC NONINTRACTABLE HEADACHE, UNSPECIFIED HEADACHE TYPE: ICD-10-CM

## 2022-12-20 DIAGNOSIS — J20.9 ACUTE BRONCHITIS DUE TO INFECTION: ICD-10-CM

## 2022-12-20 DIAGNOSIS — G89.29 CHRONIC NONINTRACTABLE HEADACHE, UNSPECIFIED HEADACHE TYPE: ICD-10-CM

## 2022-12-20 LAB
ALCOHOL URINE: ABNORMAL
AMPHETAMINE SCREEN, URINE: NEGATIVE
BARBITURATE SCREEN, URINE: NEGATIVE
BENZODIAZEPINE SCREEN, URINE: POSITIVE
BUPRENORPHINE URINE: NEGATIVE
COCAINE METABOLITE SCREEN URINE: NEGATIVE
FENTANYL SCREEN, URINE: ABNORMAL
GABAPENTIN SCREEN, URINE: ABNORMAL
MDMA URINE: NEGATIVE
METHADONE SCREEN, URINE: NEGATIVE
METHAMPHETAMINE, URINE: NEGATIVE
OPIATE SCREEN URINE: NEGATIVE
OXYCODONE SCREEN URINE: NEGATIVE
PHENCYCLIDINE SCREEN URINE: NEGATIVE
PROPOXYPHENE SCREEN, URINE: ABNORMAL
SYNTHETIC CANNABINOIDS(K2) SCREEN, URINE: ABNORMAL
THC SCREEN, URINE: NEGATIVE
TRAMADOL SCREEN URINE: ABNORMAL
TRICYCLIC ANTIDEPRESSANTS, UR: ABNORMAL

## 2022-12-20 PROCEDURE — 4004F PT TOBACCO SCREEN RCVD TLK: CPT | Performed by: PHYSICIAN ASSISTANT

## 2022-12-20 PROCEDURE — 80305 DRUG TEST PRSMV DIR OPT OBS: CPT | Performed by: PHYSICIAN ASSISTANT

## 2022-12-20 PROCEDURE — 99214 OFFICE O/P EST MOD 30 MIN: CPT | Performed by: PHYSICIAN ASSISTANT

## 2022-12-20 PROCEDURE — G8484 FLU IMMUNIZE NO ADMIN: HCPCS | Performed by: PHYSICIAN ASSISTANT

## 2022-12-20 PROCEDURE — G8427 DOCREV CUR MEDS BY ELIG CLIN: HCPCS | Performed by: PHYSICIAN ASSISTANT

## 2022-12-20 PROCEDURE — G8417 CALC BMI ABV UP PARAM F/U: HCPCS | Performed by: PHYSICIAN ASSISTANT

## 2022-12-20 RX ORDER — CEFDINIR 300 MG/1
300 CAPSULE ORAL 2 TIMES DAILY
Qty: 14 CAPSULE | Refills: 0 | Status: SHIPPED | OUTPATIENT
Start: 2022-12-20 | End: 2022-12-27

## 2022-12-20 RX ORDER — ALPRAZOLAM 1 MG/1
1 TABLET ORAL 2 TIMES DAILY PRN
Qty: 60 TABLET | Refills: 1 | Status: SHIPPED | OUTPATIENT
Start: 2022-12-20 | End: 2023-02-18

## 2022-12-20 RX ORDER — BUPROPION HYDROCHLORIDE 150 MG/1
150 TABLET ORAL EVERY MORNING
Qty: 30 TABLET | Refills: 3 | Status: SHIPPED | OUTPATIENT
Start: 2022-12-20

## 2022-12-20 RX ORDER — ALPRAZOLAM 1 MG/1
1 TABLET ORAL NIGHTLY PRN
COMMUNITY
End: 2022-12-20 | Stop reason: SDUPTHER

## 2022-12-20 ASSESSMENT — ANXIETY QUESTIONNAIRES
6. BECOMING EASILY ANNOYED OR IRRITABLE: 3
2. NOT BEING ABLE TO STOP OR CONTROL WORRYING: 3
GAD7 TOTAL SCORE: 15
4. TROUBLE RELAXING: 3
3. WORRYING TOO MUCH ABOUT DIFFERENT THINGS: 3
1. FEELING NERVOUS, ANXIOUS, OR ON EDGE: 3
IF YOU CHECKED OFF ANY PROBLEMS ON THIS QUESTIONNAIRE, HOW DIFFICULT HAVE THESE PROBLEMS MADE IT FOR YOU TO DO YOUR WORK, TAKE CARE OF THINGS AT HOME, OR GET ALONG WITH OTHER PEOPLE: EXTREMELY DIFFICULT
5. BEING SO RESTLESS THAT IT IS HARD TO SIT STILL: 0
7. FEELING AFRAID AS IF SOMETHING AWFUL MIGHT HAPPEN: 0

## 2022-12-20 ASSESSMENT — PATIENT HEALTH QUESTIONNAIRE - PHQ9
SUM OF ALL RESPONSES TO PHQ9 QUESTIONS 1 & 2: 4
7. TROUBLE CONCENTRATING ON THINGS, SUCH AS READING THE NEWSPAPER OR WATCHING TELEVISION: 0
5. POOR APPETITE OR OVEREATING: 3
9. THOUGHTS THAT YOU WOULD BE BETTER OFF DEAD, OR OF HURTING YOURSELF: 0
SUM OF ALL RESPONSES TO PHQ QUESTIONS 1-9: 19
2. FEELING DOWN, DEPRESSED OR HOPELESS: 2
1. LITTLE INTEREST OR PLEASURE IN DOING THINGS: 2
4. FEELING TIRED OR HAVING LITTLE ENERGY: 3
10. IF YOU CHECKED OFF ANY PROBLEMS, HOW DIFFICULT HAVE THESE PROBLEMS MADE IT FOR YOU TO DO YOUR WORK, TAKE CARE OF THINGS AT HOME, OR GET ALONG WITH OTHER PEOPLE: 3
SUM OF ALL RESPONSES TO PHQ QUESTIONS 1-9: 19
8. MOVING OR SPEAKING SO SLOWLY THAT OTHER PEOPLE COULD HAVE NOTICED. OR THE OPPOSITE, BEING SO FIGETY OR RESTLESS THAT YOU HAVE BEEN MOVING AROUND A LOT MORE THAN USUAL: 3
SUM OF ALL RESPONSES TO PHQ QUESTIONS 1-9: 19
SUM OF ALL RESPONSES TO PHQ QUESTIONS 1-9: 19
3. TROUBLE FALLING OR STAYING ASLEEP: 3
6. FEELING BAD ABOUT YOURSELF - OR THAT YOU ARE A FAILURE OR HAVE LET YOURSELF OR YOUR FAMILY DOWN: 3

## 2022-12-20 NOTE — PROGRESS NOTES
Patrick Bear  1980  38 y.o.  female    SUBJECTIVE:    Chief Complaint   Patient presents with    New Patient     New to provider-     Other     States that she has not been feeling well x 1 month- chest hurts, sinus pressure,back of her eyes hurt    Depression    Anxiety       HPI  Pt here today to establish care today as previous PCP no longer here in office. Depression/anxiety-chronic, has been on zoloft/xanax but pt states she does not feel meds are very helpful at this time. Expresses anergia/ahedonia. Denies SI/HI/AVH      Cough-onset one month ago with cougn/chest congesion/sinus pain and pressure. Pt treated through walk in clinic for OM/bronchitis. pt feels tired/poor energy/coughing up large amounts of phlegm. -purulent at times. .Wheezing despite using albuterol inhaler. PHQ Scores 12/20/2022 3/3/2022 7/13/2021 6/22/2021 3/5/2021   PHQ2 Score 4 6 6 0 0   PHQ9 Score 19 14 18 0 0     Interpretation of Total Score Depression Severity: 1-4 = Minimal depression, 5-9 = Mild depression, 10-14 = Moderate depression, 15-19 = Moderately severe depression, 20-27 = Severe depression     Current Outpatient Medications on File Prior to Visit   Medication Sig Dispense Refill    butalbital-acetaminophen-caffeine (FIORICET, ESGIC) -40 MG per tablet TAKE 1 TABLET BY MOUTH EVERY 6 HOURS AS NEEDED FOR MIGRAINE. MAX OF 4 PER DAY 15 tablet 0    sertraline (ZOLOFT) 100 MG tablet TAKE 1 TABLET BY MOUTH EVERY DAY 60 tablet 1    tiZANidine (ZANAFLEX) 4 MG tablet Take 1 tablet by mouth nightly Take 1/2 tab nightly for 8 nights then increase to full tab.  30 tablet 3    ibuprofen (ADVIL;MOTRIN) 600 MG tablet Take 1 tablet by mouth 3 times daily as needed for Pain 30 tablet 5    hydrOXYzine HCl (ATARAX) 50 MG tablet Take 1 tablet by mouth every 8 hours as needed for Itching 180 tablet 3    albuterol sulfate HFA (VENTOLIN HFA) 108 (90 Base) MCG/ACT inhaler Inhale 2 puffs into the lungs 4 times daily as needed for Wheezing (Patient taking differently: Inhale 2 puffs into the lungs 4 times daily as needed for Wheezing prn) 18 g 0    sertraline (ZOLOFT) 50 MG tablet TAKE 1 TABLET BY MOUTH EVERY DAY (Patient not taking: Reported on 2022)      Rimegepant Sulfate (NURTEC) 75 MG TBDP Take 75 mg by mouth as needed (Migraine) Not to exceed more than 1 tab in 24 hours (Patient not taking: Reported on 2022) 16 tablet 2    Rimegepant Sulfate (NURTEC) 75 MG TBDP LOT# [id number] EXP:  (Patient not taking: Reported on 2022) 4 tablet 0     No current facility-administered medications on file prior to visit. No Known Allergies    Past Medical History:   Diagnosis Date    Acute suppurative otitis media without spontaneous rupture of ear drum, unspecified ear 2021    Anxiety disorder, unspecified 10/19/2020    Headache     migraines    Impacted cerumen, right ear 2021    Lumbar sprain        Past Surgical History:   Procedure Laterality Date    ABDOMINAL EXPLORATION SURGERY      BUNIONECTOMY Right     EAR SURGERY         Social History     Socioeconomic History    Marital status:      Spouse name: None    Number of children: None    Years of education: None    Highest education level: None   Tobacco Use    Smoking status: Every Day     Packs/day: 0.50     Years: 24.00     Pack years: 12.00     Types: Cigarettes     Last attempt to quit: 10/20/2021     Years since quittin.1    Smokeless tobacco: Never   Vaping Use    Vaping Use: Never used   Substance and Sexual Activity    Alcohol use: Yes     Comment: Rare    Drug use: Never       Review of Systems   Constitutional:  Positive for fatigue. Negative for chills and fever. HENT:  Positive for congestion and sinus pressure. Negative for sore throat. Eyes: Negative. Respiratory:  Positive for cough, chest tightness and wheezing. Cardiovascular:  Negative for chest pain.    Gastrointestinal:  Negative for abdominal pain, diarrhea, nausea and vomiting. Skin: Negative. Neurological:  Negative for dizziness and headaches. Psychiatric/Behavioral:  Positive for dysphoric mood. The patient is nervous/anxious. OBJECTIVE:    /80 (Site: Left Upper Arm, Position: Sitting, Cuff Size: Large Adult)   Pulse 70   Temp 98.2 °F (36.8 °C)   Resp 18   Wt 234 lb 12.8 oz (106.5 kg)   SpO2 98%   BMI 39.07 kg/m²     Physical Exam  Vitals reviewed. Constitutional:       Appearance: Normal appearance. HENT:      Head: Normocephalic. Right Ear: External ear normal.      Left Ear: External ear normal.   Eyes:      Extraocular Movements: Extraocular movements intact. Cardiovascular:      Rate and Rhythm: Normal rate and regular rhythm. Heart sounds: Normal heart sounds. Pulmonary:      Effort: Pulmonary effort is normal.      Breath sounds: Wheezing and rhonchi present. Musculoskeletal:      Cervical back: Normal range of motion and neck supple. Skin:     General: Skin is warm and dry. Neurological:      Mental Status: She is alert and oriented to person, place, and time. Psychiatric:         Attention and Perception: Attention normal.         Mood and Affect: Mood is anxious and depressed. Affect is tearful. Speech: Speech normal.         Behavior: Behavior normal. Behavior is cooperative. Thought Content:  Thought content normal.       Kellie Ahumada:    Problem List          Respiratory    Acute bronchitis due to infection     ATB/spacer for albuterol inhaler   Add pulmicort inhaler, CXR if not improving in next few days, f/u here or ER if worse         Relevant Medications    budesonide (PULMICORT FLEXHALER) 180 MCG/ACT AEPB inhaler    cefdinir (OMNICEF) 300 MG capsule       Other    Severe episode of recurrent major depressive disorder, without psychotic features ([facility] [address] - Primary     Continue zoloft, add wellbutrin, Risks/benefits/SE reviewed, pt voices understanding  Recheck in 6-8 weeks, sooner if worse Relevant Medications    hydrOXYzine HCl (ATARAX) 50 MG tablet    sertraline (ZOLOFT) 100 MG tablet    sertraline (ZOLOFT) 50 MG tablet    ALPRAZolam (XANAX) 1 MG tablet    buPROPion (WELLBUTRIN XL) 150 MG extended release tablet    Encounter for drug screening    Relevant Orders    POCT Rapid Drug Screen (Completed)    Anxiety     Will refill xanax today, Risks/benefits/SE reviewed, pt voices understanding  Drug screen obtained today-WNL. recheck in 6-8 weeks, sooner prn          Relevant Medications    hydrOXYzine HCl (ATARAX) 50 MG tablet    sertraline (ZOLOFT) 100 MG tablet    sertraline (ZOLOFT) 50 MG tablet    ALPRAZolam (XANAX) 1 MG tablet    buPROPion (WELLBUTRIN XL) 150 MG extended release tablet            Return in about 6 weeks (around 1/31/2023).

## 2022-12-20 NOTE — LETTER
[facility] [address] [place] [id number]  Phone: [phone]  Fax: [phone]    Lita Kan        December 20, 2022     Patient: Deric Saleem   YOB: 1980   Date of Visit: 12/20/2022       To Whom It May Concern: It is my medical opinion that Deric Saleem may return to work on 12/23/2022. If you have any questions or concerns, please don't hesitate to call.     Sincerely,        Caitlin Wynn PA-C

## 2022-12-21 PROBLEM — F33.2 SEVERE EPISODE OF RECURRENT MAJOR DEPRESSIVE DISORDER, WITHOUT PSYCHOTIC FEATURES (HCC): Status: ACTIVE | Noted: 2022-12-21

## 2022-12-21 PROBLEM — H66.009 ACUTE SUPPURATIVE OTITIS MEDIA WITHOUT SPONTANEOUS RUPTURE OF EAR DRUM, UNSPECIFIED EAR: Status: RESOLVED | Noted: 2021-11-19 | Resolved: 2022-12-21

## 2022-12-21 PROBLEM — R05.1 ACUTE COUGH: Status: ACTIVE | Noted: 2022-12-21

## 2022-12-21 PROBLEM — F41.9 ANXIETY: Status: ACTIVE | Noted: 2022-12-21

## 2022-12-21 PROBLEM — J20.9 ACUTE BRONCHITIS DUE TO INFECTION: Status: ACTIVE | Noted: 2022-12-21

## 2022-12-21 PROBLEM — H61.21 IMPACTED CERUMEN, RIGHT EAR: Status: RESOLVED | Noted: 2021-11-05 | Resolved: 2022-12-21

## 2022-12-21 PROBLEM — F41.9 ANXIETY DISORDER, UNSPECIFIED: Status: RESOLVED | Noted: 2020-10-19 | Resolved: 2022-12-21

## 2022-12-21 PROBLEM — Z02.83 ENCOUNTER FOR DRUG SCREENING: Status: ACTIVE | Noted: 2022-12-21

## 2022-12-21 ASSESSMENT — ENCOUNTER SYMPTOMS
SINUS PRESSURE: 1
NAUSEA: 0
WHEEZING: 1
VOMITING: 0
DIARRHEA: 0
CHEST TIGHTNESS: 1
EYES NEGATIVE: 1
COUGH: 1
SORE THROAT: 0
ABDOMINAL PAIN: 0

## 2022-12-21 NOTE — ASSESSMENT & PLAN NOTE
ATB/spacer for albuterol inhaler   Add pulmicort inhaler, CXR if not improving in next few days, f/u here or ER if worse

## 2022-12-21 NOTE — ASSESSMENT & PLAN NOTE
Will refill xanax today, Risks/benefits/SE reviewed, pt voices understanding  Drug screen obtained today-WNL. recheck in 6-8 weeks, sooner prn

## 2022-12-21 NOTE — ASSESSMENT & PLAN NOTE
Continue zoloft, add wellbutrin, Risks/benefits/SE reviewed, pt voices understanding  Recheck in 6-8 weeks, sooner if worse

## 2022-12-22 ENCOUNTER — HOSPITAL ENCOUNTER (OUTPATIENT)
Dept: GENERAL RADIOLOGY | Age: 42
Discharge: HOME | End: 2022-12-22
Payer: COMMERCIAL

## 2022-12-22 ENCOUNTER — HOSPITAL ENCOUNTER (OUTPATIENT)
Age: 42
Discharge: HOME | End: 2022-12-22
Payer: COMMERCIAL

## 2022-12-22 DIAGNOSIS — J20.9 ACUTE BRONCHITIS DUE TO INFECTION: ICD-10-CM

## 2022-12-22 PROCEDURE — 71046 X-RAY EXAM CHEST 2 VIEWS: CPT

## 2022-12-22 RX ORDER — BUTALBITAL, ACETAMINOPHEN AND CAFFEINE 50; 325; 40 MG/1; MG/1; MG/1
TABLET ORAL
Qty: 15 TABLET | Refills: 0 | Status: SHIPPED | OUTPATIENT
Start: 2022-12-22

## 2022-12-22 NOTE — TELEPHONE ENCOUNTER
Are we still filling this medication for the patient? Requested Prescriptions     Pending Prescriptions Disp Refills    butalbital-acetaminophen-caffeine (FIORICET, ESGIC) -40 MG per tablet [Pharmacy Med Name: [id number] -40] 15 tablet 0     Sig: TAKE 1 TABLET BY MOUTH EVERY 6 HOURS AS NEEDED FOR MIGRAINE.  MAX OF 4 PER DAY

## 2023-01-12 RX ORDER — BUPROPION HYDROCHLORIDE 150 MG/1
TABLET ORAL
Qty: 30 TABLET | Refills: 3 | Status: SHIPPED | OUTPATIENT
Start: 2023-01-12

## 2023-01-15 NOTE — PROGRESS NOTES
1/16/23    Alicia Nicole  1980    Chief Complaint   Patient presents with    Follow-up     Migraines        History of Present Illness  Elizabeth Tripp is a 38 y.o. female presenting today for follow-up of:  chronic migraine with superimposed bilateral occipital neuralgia. She remains on Tizanidine 4 mg at bedtime and Fiorcet for migraine abortive therapy. She tried Nurtec and it did not help as well as Fiorcet at decreasing the intensity of her migraine. She is only having 2/30 migraine days and it satisfied with the Tizanidine and Fiorcet combination. Prior Preventative medications tried: topamax (nausea), amitriptyline, Effexor, tizanidine  Current preventative: Effexor  Prior abortive medications tried: Fioricet, Imitrex (side effects), Maxalt (not efficacious)  Current abortive: Fioricet      Current Outpatient Medications   Medication Sig Dispense Refill    butalbital-acetaminophen-caffeine (FIORICET, ESGIC) -40 MG per tablet TAKE 1 TABLET BY MOUTH EVERY 6 HOURS AS NEEDED FOR MIGRAINE. MAX OF 4 PER DAY 15 tablet 6    tiZANidine (ZANAFLEX) 4 MG tablet Take 1 tablet by mouth nightly 30 tablet 6    buPROPion (WELLBUTRIN XL) 150 MG extended release tablet TAKE 1 TABLET BY MOUTH EVERY DAY IN THE MORNING 30 tablet 3    Spacer/Aero-Holding Chambers WILLIAM 1 Device by Does not apply route daily as needed (with inhaler) 1 each 0    budesonide (PULMICORT FLEXHALER) 180 MCG/ACT AEPB inhaler Inhale 2 puffs into the lungs in the morning and 2 puffs in the evening. 1 each 5    ALPRAZolam (XANAX) 1 MG tablet Take 1 tablet by mouth 2 times daily as needed for Anxiety for up to 60 days.  60 tablet 1    sertraline (ZOLOFT) 100 MG tablet TAKE 1 TABLET BY MOUTH EVERY DAY 60 tablet 1    ibuprofen (ADVIL;MOTRIN) 600 MG tablet Take 1 tablet by mouth 3 times daily as needed for Pain 30 tablet 5    hydrOXYzine HCl (ATARAX) 50 MG tablet Take 1 tablet by mouth every 8 hours as needed for Itching 180 tablet 3    albuterol sulfate HFA (VENTOLIN HFA) 108 (90 Base) MCG/ACT inhaler Inhale 2 puffs into the lungs 4 times daily as needed for Wheezing (Patient taking differently: Inhale 2 puffs into the lungs 4 times daily as needed for Wheezing prn) 18 g 0     No current facility-administered medications for this visit. Physical Exam:    Mental Status   Orientation: oriented to person, oriented to place, oriented to problem, and oriented to time    Mood/affectappropriate mood and appropriate affect   Memory/Other: recent memory intact, remote memory intact, fund of knowledge intact, attention span normal, and concentration normal  Language  Language: (normal) language, no dysarthria, (normal) articulation, and no dysphasia/aphasia  Cranial Nerves   Eyes: pupils normal size and reactive to light and visual fields appear full   CN III, IV, VI : extraocular muscle strength normal, normal pursuit, no nystagmus, and no ptosis   Facial Motor: normal facial motor   CN XII: tongue protrudes midline  Motor/Coordination Exam   Power: motor strength appears intact throughout, no arm drift, and normal tone   Coordination: normal finger-to-nose, forearm rotation intact, and rapid alternating movement normal  Gait and Stance   Gait/Posture: station normal, casual gait normal, ambulates independently , tiptoe normal, and steady in Romberg's position with eyes open and closed        /74 (Site: Right Upper Arm, Position: Sitting, Cuff Size: Large Adult)   Pulse 95   Ht 5' 5\" (1.651 m)   Wt 234 lb (106.1 kg)   SpO2 97%   BMI 38.94 kg/m²     Assessment and Plan     Diagnosis Orders   1. Chronic nonintractable headache, unspecified headache type  butalbital-acetaminophen-caffeine (FIORICET, ESGIC) -40 MG per tablet    tiZANidine (ZANAFLEX) 4 MG tablet        Mary Gipson was seen in neurological follow up in regards to migraine. Mary Gipson is satisfied with the decrease in frequency and intensity of her migraines on Tizanidine and Fiorcet.  No changes made today. Return in about 6 months (around 7/16/2023).     Hawa Brown, ANDREW - CNP

## 2023-01-16 ENCOUNTER — OFFICE VISIT (OUTPATIENT)
Dept: NEUROLOGY | Age: 43
End: 2023-01-16
Payer: COMMERCIAL

## 2023-01-16 VITALS
HEIGHT: 65 IN | WEIGHT: 234 LBS | BODY MASS INDEX: 38.99 KG/M2 | OXYGEN SATURATION: 97 % | DIASTOLIC BLOOD PRESSURE: 74 MMHG | HEART RATE: 95 BPM | SYSTOLIC BLOOD PRESSURE: 112 MMHG

## 2023-01-16 DIAGNOSIS — R51.9 CHRONIC NONINTRACTABLE HEADACHE, UNSPECIFIED HEADACHE TYPE: ICD-10-CM

## 2023-01-16 DIAGNOSIS — G89.29 CHRONIC NONINTRACTABLE HEADACHE, UNSPECIFIED HEADACHE TYPE: ICD-10-CM

## 2023-01-16 PROCEDURE — 4004F PT TOBACCO SCREEN RCVD TLK: CPT | Performed by: NURSE PRACTITIONER

## 2023-01-16 PROCEDURE — G8484 FLU IMMUNIZE NO ADMIN: HCPCS | Performed by: NURSE PRACTITIONER

## 2023-01-16 PROCEDURE — G8427 DOCREV CUR MEDS BY ELIG CLIN: HCPCS | Performed by: NURSE PRACTITIONER

## 2023-01-16 PROCEDURE — 99213 OFFICE O/P EST LOW 20 MIN: CPT | Performed by: NURSE PRACTITIONER

## 2023-01-16 PROCEDURE — G8417 CALC BMI ABV UP PARAM F/U: HCPCS | Performed by: NURSE PRACTITIONER

## 2023-01-16 RX ORDER — BUTALBITAL, ACETAMINOPHEN AND CAFFEINE 50; 325; 40 MG/1; MG/1; MG/1
TABLET ORAL
Qty: 15 TABLET | Refills: 6 | Status: SHIPPED | OUTPATIENT
Start: 2023-01-16

## 2023-01-16 RX ORDER — TIZANIDINE 4 MG/1
4 TABLET ORAL NIGHTLY
Qty: 30 TABLET | Refills: 6 | Status: SHIPPED | OUTPATIENT
Start: 2023-01-16

## 2023-01-16 NOTE — PATIENT INSTRUCTIONS
Please contact our office around 4/15/23 to get your follow up appointment made for July 2023. Our scheduling phone number is [phone]. Thank you!

## 2023-03-10 ENCOUNTER — PATIENT MESSAGE (OUTPATIENT)
Dept: FAMILY MEDICINE CLINIC | Age: 43
End: 2023-03-10

## 2023-03-10 DIAGNOSIS — G89.29 CHRONIC NONINTRACTABLE HEADACHE, UNSPECIFIED HEADACHE TYPE: ICD-10-CM

## 2023-03-10 DIAGNOSIS — R51.9 CHRONIC NONINTRACTABLE HEADACHE, UNSPECIFIED HEADACHE TYPE: ICD-10-CM

## 2023-03-10 RX ORDER — IBUPROFEN 600 MG/1
600 TABLET ORAL 3 TIMES DAILY PRN
Qty: 30 TABLET | Refills: 5 | Status: SHIPPED | OUTPATIENT
Start: 2023-03-10

## 2023-03-10 NOTE — TELEPHONE ENCOUNTER
From: Karyn May  To: Rufina Alea  Sent: 3/10/2023 12:28 PM EST  Subject: Alprazolam    I am out of my anxiety medication Xanax. I have been taking 2 tablets twice a day like we discussed the last time. And it seems to be working. I had to cancel my appointment in January because I started a new job and cannot miss work right now. It didnt look good but I do need a refill on this medication as well as the ibuprofen. I take that for my headaches when I do not want to take the butabital. Please help.

## 2023-03-13 DIAGNOSIS — F41.9 ANXIETY: ICD-10-CM

## 2023-03-13 RX ORDER — ALPRAZOLAM 1 MG/1
1 TABLET ORAL 2 TIMES DAILY PRN
Qty: 60 TABLET | Refills: 1 | Status: SHIPPED | OUTPATIENT
Start: 2023-03-13 | End: 2023-05-12

## 2023-04-04 ENCOUNTER — OFFICE VISIT (OUTPATIENT)
Dept: FAMILY MEDICINE CLINIC | Age: 43
End: 2023-04-04
Payer: COMMERCIAL

## 2023-04-04 VITALS
TEMPERATURE: 97.5 F | WEIGHT: 218 LBS | SYSTOLIC BLOOD PRESSURE: 104 MMHG | DIASTOLIC BLOOD PRESSURE: 72 MMHG | HEART RATE: 82 BPM | OXYGEN SATURATION: 98 % | BODY MASS INDEX: 36.28 KG/M2 | RESPIRATION RATE: 18 BRPM

## 2023-04-04 DIAGNOSIS — H61.21 IMPACTED CERUMEN OF RIGHT EAR: ICD-10-CM

## 2023-04-04 DIAGNOSIS — F41.9 ANXIETY: Primary | ICD-10-CM

## 2023-04-04 DIAGNOSIS — R10.84 GENERALIZED ABDOMINAL PAIN: ICD-10-CM

## 2023-04-04 DIAGNOSIS — J30.2 OTHER SEASONAL ALLERGIC RHINITIS: ICD-10-CM

## 2023-04-04 PROBLEM — J20.9 ACUTE BRONCHITIS DUE TO INFECTION: Status: RESOLVED | Noted: 2022-12-21 | Resolved: 2023-04-04

## 2023-04-04 LAB
ALBUMIN SERPL-MCNC: 4 G/DL (ref 3.4–5)
ALBUMIN/GLOB SERPL: 1.8 {RATIO} (ref 1.1–2.2)
ALP SERPL-CCNC: 97 U/L (ref 40–129)
ALT SERPL-CCNC: 127 U/L (ref 10–40)
ANION GAP SERPL CALCULATED.3IONS-SCNC: 14 MMOL/L (ref 3–16)
AST SERPL-CCNC: 42 U/L (ref 15–37)
BASOPHILS # BLD: 0 K/UL (ref 0–0.2)
BASOPHILS NFR BLD: 0.5 %
BILIRUB SERPL-MCNC: 0.3 MG/DL (ref 0–1)
BUN SERPL-MCNC: 21 MG/DL (ref 7–20)
CALCIUM SERPL-MCNC: 9 MG/DL (ref 8.3–10.6)
CHLORIDE SERPL-SCNC: 105 MMOL/L (ref 99–110)
CO2 SERPL-SCNC: 22 MMOL/L (ref 21–32)
CREAT SERPL-MCNC: 0.6 MG/DL (ref 0.6–1.1)
DEPRECATED RDW RBC AUTO: 14.2 % (ref 12.4–15.4)
EOSINOPHIL # BLD: 0.4 K/UL (ref 0–0.6)
EOSINOPHIL NFR BLD: 3.7 %
GFR SERPLBLD CREATININE-BSD FMLA CKD-EPI: >60 ML/MIN/{1.73_M2}
GLUCOSE SERPL-MCNC: 104 MG/DL (ref 70–99)
HCT VFR BLD AUTO: 41.9 % (ref 36–48)
HETEROPH AB BLD QL IA: NEGATIVE
HGB BLD-MCNC: 13.7 G/DL (ref 12–16)
LYMPHOCYTES # BLD: 2.6 K/UL (ref 1–5.1)
LYMPHOCYTES NFR BLD: 26.8 %
MCH RBC QN AUTO: 29.4 PG (ref 26–34)
MCHC RBC AUTO-ENTMCNC: 32.8 G/DL (ref 31–36)
MCV RBC AUTO: 89.6 FL (ref 80–100)
MONOCYTES # BLD: 0.6 K/UL (ref 0–1.3)
MONOCYTES NFR BLD: 5.9 %
NEUTROPHILS # BLD: 6.1 K/UL (ref 1.7–7.7)
NEUTROPHILS NFR BLD: 63.1 %
PLATELET # BLD AUTO: 342 K/UL (ref 135–450)
PMV BLD AUTO: 9.4 FL (ref 5–10.5)
POTASSIUM SERPL-SCNC: 4.5 MMOL/L (ref 3.5–5.1)
PROT SERPL-MCNC: 6.2 G/DL (ref 6.4–8.2)
RBC # BLD AUTO: 4.67 M/UL (ref 4–5.2)
SODIUM SERPL-SCNC: 141 MMOL/L (ref 136–145)
WBC # BLD AUTO: 9.7 K/UL (ref 4–11)

## 2023-04-04 PROCEDURE — 99214 OFFICE O/P EST MOD 30 MIN: CPT | Performed by: PHYSICIAN ASSISTANT

## 2023-04-04 PROCEDURE — G8417 CALC BMI ABV UP PARAM F/U: HCPCS | Performed by: PHYSICIAN ASSISTANT

## 2023-04-04 PROCEDURE — 4004F PT TOBACCO SCREEN RCVD TLK: CPT | Performed by: PHYSICIAN ASSISTANT

## 2023-04-04 PROCEDURE — G8427 DOCREV CUR MEDS BY ELIG CLIN: HCPCS | Performed by: PHYSICIAN ASSISTANT

## 2023-04-04 RX ORDER — ALPRAZOLAM 1 MG/1
1 TABLET ORAL 2 TIMES DAILY PRN
Qty: 60 TABLET | Refills: 1 | Status: SHIPPED | OUTPATIENT
Start: 2023-04-04 | End: 2023-06-03

## 2023-04-04 RX ORDER — PREDNISONE 20 MG/1
20 TABLET ORAL DAILY
Qty: 5 TABLET | Refills: 0 | Status: SHIPPED | OUTPATIENT
Start: 2023-04-04 | End: 2023-04-09

## 2023-04-04 SDOH — ECONOMIC STABILITY: INCOME INSECURITY: HOW HARD IS IT FOR YOU TO PAY FOR THE VERY BASICS LIKE FOOD, HOUSING, MEDICAL CARE, AND HEATING?: NOT HARD AT ALL

## 2023-04-04 SDOH — ECONOMIC STABILITY: FOOD INSECURITY: WITHIN THE PAST 12 MONTHS, YOU WORRIED THAT YOUR FOOD WOULD RUN OUT BEFORE YOU GOT MONEY TO BUY MORE.: NEVER TRUE

## 2023-04-04 SDOH — ECONOMIC STABILITY: HOUSING INSECURITY
IN THE LAST 12 MONTHS, WAS THERE A TIME WHEN YOU DID NOT HAVE A STEADY PLACE TO SLEEP OR SLEPT IN A SHELTER (INCLUDING NOW)?: NO

## 2023-04-04 SDOH — ECONOMIC STABILITY: FOOD INSECURITY: WITHIN THE PAST 12 MONTHS, THE FOOD YOU BOUGHT JUST DIDN'T LAST AND YOU DIDN'T HAVE MONEY TO GET MORE.: NEVER TRUE

## 2023-04-04 ASSESSMENT — PATIENT HEALTH QUESTIONNAIRE - PHQ9
SUM OF ALL RESPONSES TO PHQ QUESTIONS 1-9: 0
2. FEELING DOWN, DEPRESSED OR HOPELESS: 0
SUM OF ALL RESPONSES TO PHQ QUESTIONS 1-9: 0
SUM OF ALL RESPONSES TO PHQ9 QUESTIONS 1 & 2: 0
1. LITTLE INTEREST OR PLEASURE IN DOING THINGS: 0
SUM OF ALL RESPONSES TO PHQ QUESTIONS 1-9: 0
SUM OF ALL RESPONSES TO PHQ QUESTIONS 1-9: 0

## 2023-04-04 ASSESSMENT — ENCOUNTER SYMPTOMS
DIARRHEA: 0
NAUSEA: 1
EYES NEGATIVE: 1
COUGH: 1
TROUBLE SWALLOWING: 0
VOMITING: 0
SORE THROAT: 0
ABDOMINAL PAIN: 1
SINUS PRESSURE: 1
SHORTNESS OF BREATH: 1

## 2023-04-04 NOTE — LETTER
April 4, 2023       Olinda Li YOB: 1980   [address] [place]. [id number] Date of Visit:  4/4/2023       To Whom It May Concern: It is my medical opinion that Olinda Li may return to full duty immediately with no restrictions. If you have any questions or concerns, please don't hesitate to call.     Sincerely,        Teddy Rosado PA-C

## 2023-04-04 NOTE — PROGRESS NOTES
Margarita Reeves  1980  37 y.o.  female    SUBJECTIVE:    Chief Complaint   Patient presents with    Follow-up     4 mth f/u-just finished antibiotic for strep throat and double ear infection-still having issues eating-dizzy    Medication Refill    Depression       HPI  Pt here for recheck. States she was treated for strep one week ago. She was seen at [facility], cx negative but pt states she was given ATB due to how her throat looked. States throat is better but she continues to have ear pain/pressure. Drinking pedialyte because she feels \"off\". States while ill she was having issues with eating/drinking. Feels lightheaded/dizzy. Abdominal discomfort,pooe appetite. Anxiety-chronic, using xanax prn. Aware of risks/side effects    Depression-much improved at this time, she did have some issues with worsening symptoms in past few months due to personal issues at home but this has resolved. PHQ Scores 4/4/2023 12/20/2022 3/3/2022 7/13/2021 6/22/2021 3/5/2021   PHQ2 Score 0 4 6 6 0 0   PHQ9 Score 0 19 14 18 0 0     Interpretation of Total Score Depression Severity: 1-4 = Minimal depression, 5-9 = Mild depression, 10-14 = Moderate depression, 15-19 = Moderately severe depression, 20-27 = Severe depression     Current Outpatient Medications on File Prior to Visit   Medication Sig Dispense Refill    ibuprofen (ADVIL;MOTRIN) 600 MG tablet Take 1 tablet by mouth 3 times daily as needed for Pain 30 tablet 5    butalbital-acetaminophen-caffeine (FIORICET, ESGIC) -40 MG per tablet TAKE 1 TABLET BY MOUTH EVERY 6 HOURS AS NEEDED FOR MIGRAINE.  MAX OF 4 PER DAY 15 tablet 6    tiZANidine (ZANAFLEX) 4 MG tablet Take 1 tablet by mouth nightly 30 tablet 6    buPROPion (WELLBUTRIN XL) 150 MG extended release tablet TAKE 1 TABLET BY MOUTH EVERY DAY IN THE MORNING 30 tablet 3    Spacer/Aero-Holding Chambers WILLIAM 1 Device by Does not apply route daily as needed (with inhaler) 1 each 0    budesonide (PULMICORT FLEXHALER) 180

## 2023-05-13 DIAGNOSIS — G89.29 CHRONIC NONINTRACTABLE HEADACHE, UNSPECIFIED HEADACHE TYPE: ICD-10-CM

## 2023-05-13 DIAGNOSIS — R51.9 CHRONIC NONINTRACTABLE HEADACHE, UNSPECIFIED HEADACHE TYPE: ICD-10-CM

## 2023-05-16 DIAGNOSIS — G89.29 CHRONIC NONINTRACTABLE HEADACHE, UNSPECIFIED HEADACHE TYPE: ICD-10-CM

## 2023-05-16 DIAGNOSIS — R51.9 CHRONIC NONINTRACTABLE HEADACHE, UNSPECIFIED HEADACHE TYPE: ICD-10-CM

## 2023-05-16 RX ORDER — BUTALBITAL, ACETAMINOPHEN AND CAFFEINE 50; 325; 40 MG/1; MG/1; MG/1
TABLET ORAL
Qty: 15 TABLET | Refills: 6 | OUTPATIENT
Start: 2023-05-16

## 2023-05-16 RX ORDER — BUTALBITAL, ACETAMINOPHEN AND CAFFEINE 50; 325; 40 MG/1; MG/1; MG/1
TABLET ORAL
Qty: 15 TABLET | Refills: 6 | Status: CANCELLED | OUTPATIENT
Start: 2023-05-16

## 2023-05-18 DIAGNOSIS — R51.9 CHRONIC NONINTRACTABLE HEADACHE, UNSPECIFIED HEADACHE TYPE: ICD-10-CM

## 2023-05-18 DIAGNOSIS — G89.29 CHRONIC NONINTRACTABLE HEADACHE, UNSPECIFIED HEADACHE TYPE: ICD-10-CM

## 2023-05-23 DIAGNOSIS — G89.29 CHRONIC NONINTRACTABLE HEADACHE, UNSPECIFIED HEADACHE TYPE: ICD-10-CM

## 2023-05-23 DIAGNOSIS — R51.9 CHRONIC NONINTRACTABLE HEADACHE, UNSPECIFIED HEADACHE TYPE: ICD-10-CM

## 2023-05-23 RX ORDER — BUTALBITAL, ACETAMINOPHEN AND CAFFEINE 50; 325; 40 MG/1; MG/1; MG/1
TABLET ORAL
Qty: 15 TABLET | Refills: 6 | OUTPATIENT
Start: 2023-05-23

## 2023-05-25 DIAGNOSIS — G89.29 CHRONIC NONINTRACTABLE HEADACHE, UNSPECIFIED HEADACHE TYPE: ICD-10-CM

## 2023-05-25 DIAGNOSIS — G43.709 CHRONIC MIGRAINE WITHOUT AURA WITHOUT STATUS MIGRAINOSUS, NOT INTRACTABLE: Primary | ICD-10-CM

## 2023-05-25 DIAGNOSIS — R51.9 CHRONIC NONINTRACTABLE HEADACHE, UNSPECIFIED HEADACHE TYPE: ICD-10-CM

## 2023-05-25 RX ORDER — BUTALBITAL, ACETAMINOPHEN AND CAFFEINE 50; 325; 40 MG/1; MG/1; MG/1
TABLET ORAL
Qty: 15 TABLET | Refills: 3 | Status: SHIPPED | OUTPATIENT
Start: 2023-05-25

## 2023-07-06 DIAGNOSIS — G89.29 CHRONIC NONINTRACTABLE HEADACHE, UNSPECIFIED HEADACHE TYPE: ICD-10-CM

## 2023-07-06 DIAGNOSIS — R51.9 CHRONIC NONINTRACTABLE HEADACHE, UNSPECIFIED HEADACHE TYPE: ICD-10-CM

## 2023-07-06 RX ORDER — IBUPROFEN 600 MG/1
TABLET ORAL
Qty: 30 TABLET | Refills: 0 | Status: SHIPPED | OUTPATIENT
Start: 2023-07-06 | End: 2023-08-01 | Stop reason: SDUPTHER

## 2023-07-11 ENCOUNTER — OFFICE VISIT (OUTPATIENT)
Dept: NEUROLOGY | Age: 43
End: 2023-07-11
Payer: COMMERCIAL

## 2023-07-11 VITALS
BODY MASS INDEX: 36.32 KG/M2 | HEIGHT: 65 IN | SYSTOLIC BLOOD PRESSURE: 126 MMHG | WEIGHT: 218 LBS | HEART RATE: 85 BPM | OXYGEN SATURATION: 93 % | DIASTOLIC BLOOD PRESSURE: 76 MMHG

## 2023-07-11 DIAGNOSIS — R51.9 CHRONIC NONINTRACTABLE HEADACHE, UNSPECIFIED HEADACHE TYPE: ICD-10-CM

## 2023-07-11 DIAGNOSIS — G89.29 CHRONIC NONINTRACTABLE HEADACHE, UNSPECIFIED HEADACHE TYPE: ICD-10-CM

## 2023-07-11 PROCEDURE — G8427 DOCREV CUR MEDS BY ELIG CLIN: HCPCS | Performed by: NURSE PRACTITIONER

## 2023-07-11 PROCEDURE — 99214 OFFICE O/P EST MOD 30 MIN: CPT | Performed by: NURSE PRACTITIONER

## 2023-07-11 PROCEDURE — 4004F PT TOBACCO SCREEN RCVD TLK: CPT | Performed by: NURSE PRACTITIONER

## 2023-07-11 PROCEDURE — G8417 CALC BMI ABV UP PARAM F/U: HCPCS | Performed by: NURSE PRACTITIONER

## 2023-07-11 RX ORDER — TIZANIDINE 4 MG/1
6 TABLET ORAL NIGHTLY
Qty: 45 TABLET | Refills: 4 | Status: SHIPPED | OUTPATIENT
Start: 2023-07-11

## 2023-07-11 RX ORDER — KETOROLAC TROMETHAMINE 30 MG/ML
30 INJECTION, SOLUTION INTRAMUSCULAR; INTRAVENOUS ONCE
Status: COMPLETED | OUTPATIENT
Start: 2023-07-11 | End: 2023-07-11

## 2023-07-11 RX ORDER — BUTALBITAL, ACETAMINOPHEN AND CAFFEINE 50; 325; 40 MG/1; MG/1; MG/1
TABLET ORAL
Qty: 15 TABLET | Refills: 3 | Status: SHIPPED | OUTPATIENT
Start: 2023-07-11

## 2023-07-11 RX ADMIN — KETOROLAC TROMETHAMINE 30 MG: 30 INJECTION, SOLUTION INTRAMUSCULAR; INTRAVENOUS at 08:30

## 2023-07-11 NOTE — PROGRESS NOTES
7/11/23    Don Siemens  1980    Chief Complaint   Patient presents with    Headache     Pt presents for f/u of headaches       History of Present Illness  Louella Gilford is a 37 y.o. female presenting today for follow-up of:  chronic migraine with superimposed bilateral occipital neuralgia. She remains on Tizanidine 4 mg at bedtime and Fiorcet for migraine abortive therapy. Today, she tells me she has 1 migraines per month but they can last for days. She uses Fioricet for abortive therapy. She did wake up with a migraine today. She has not taken a Fioricet yet. She tells me when she gets a migraine they typically last days. She does not want to try anything else for abortive therapy other than Fioricet. Prior Preventative medications tried: topamax (nausea), amitriptyline, Effexor, tizanidine  Current preventative: Effexor  Prior abortive medications tried: Fioricet, Imitrex (side effects), Maxalt (not efficacious)  Current abortive: Fioricet    Current Outpatient Medications   Medication Sig Dispense Refill    tiZANidine (ZANAFLEX) 4 MG tablet Take 1.5 tablets by mouth nightly 45 tablet 4    butalbital-acetaminophen-caffeine (FIORICET, ESGIC) -40 MG per tablet TAKE 1 TABLET BY MOUTH EVERY 6 HOURS AS NEEDED FOR MIGRAINE. MAX OF 4 PER DAY 15 tablet 3    ibuprofen (ADVIL;MOTRIN) 600 MG tablet TAKE 1 TABLET BY MOUTH THREE TIMES A DAY AS NEEDED FOR PAIN 30 tablet 0    buPROPion (WELLBUTRIN XL) 150 MG extended release tablet TAKE 1 TABLET BY MOUTH EVERY DAY IN THE MORNING 30 tablet 3    budesonide (PULMICORT FLEXHALER) 180 MCG/ACT AEPB inhaler Inhale 2 puffs into the lungs in the morning and 2 puffs in the evening.  1 each 5    sertraline (ZOLOFT) 100 MG tablet TAKE 1 TABLET BY MOUTH EVERY DAY 60 tablet 1    albuterol sulfate HFA (VENTOLIN HFA) 108 (90 Base) MCG/ACT inhaler Inhale 2 puffs into the lungs 4 times daily as needed for Wheezing (Patient taking differently: Inhale 2 puffs into the lungs 4

## 2023-08-01 ENCOUNTER — OFFICE VISIT (OUTPATIENT)
Dept: FAMILY MEDICINE CLINIC | Age: 43
End: 2023-08-01
Payer: COMMERCIAL

## 2023-08-01 VITALS
BODY MASS INDEX: 37.21 KG/M2 | RESPIRATION RATE: 16 BRPM | WEIGHT: 223.6 LBS | OXYGEN SATURATION: 98 % | HEART RATE: 80 BPM | SYSTOLIC BLOOD PRESSURE: 128 MMHG | DIASTOLIC BLOOD PRESSURE: 74 MMHG

## 2023-08-01 DIAGNOSIS — R51.9 CHRONIC NONINTRACTABLE HEADACHE, UNSPECIFIED HEADACHE TYPE: ICD-10-CM

## 2023-08-01 DIAGNOSIS — G89.29 CHRONIC NONINTRACTABLE HEADACHE, UNSPECIFIED HEADACHE TYPE: ICD-10-CM

## 2023-08-01 DIAGNOSIS — F41.1 GAD (GENERALIZED ANXIETY DISORDER): ICD-10-CM

## 2023-08-01 DIAGNOSIS — F41.9 ANXIETY: ICD-10-CM

## 2023-08-01 DIAGNOSIS — F33.2 SEVERE EPISODE OF RECURRENT MAJOR DEPRESSIVE DISORDER, WITHOUT PSYCHOTIC FEATURES (HCC): ICD-10-CM

## 2023-08-01 DIAGNOSIS — F33.1 MODERATE EPISODE OF RECURRENT MAJOR DEPRESSIVE DISORDER (HCC): ICD-10-CM

## 2023-08-01 PROCEDURE — 99214 OFFICE O/P EST MOD 30 MIN: CPT | Performed by: NURSE PRACTITIONER

## 2023-08-01 PROCEDURE — 4004F PT TOBACCO SCREEN RCVD TLK: CPT | Performed by: NURSE PRACTITIONER

## 2023-08-01 PROCEDURE — G8427 DOCREV CUR MEDS BY ELIG CLIN: HCPCS | Performed by: NURSE PRACTITIONER

## 2023-08-01 PROCEDURE — G8417 CALC BMI ABV UP PARAM F/U: HCPCS | Performed by: NURSE PRACTITIONER

## 2023-08-01 RX ORDER — ALPRAZOLAM 1 MG/1
TABLET ORAL
Qty: 60 TABLET | Refills: 1 | Status: SHIPPED | OUTPATIENT
Start: 2023-08-01 | End: 2023-09-29

## 2023-08-01 RX ORDER — SERTRALINE HYDROCHLORIDE 100 MG/1
TABLET, FILM COATED ORAL
Qty: 90 TABLET | Refills: 1 | Status: SHIPPED | OUTPATIENT
Start: 2023-08-01

## 2023-08-01 RX ORDER — IBUPROFEN 600 MG/1
TABLET ORAL
Qty: 60 TABLET | Refills: 1 | Status: SHIPPED | OUTPATIENT
Start: 2023-08-01

## 2023-08-01 RX ORDER — BUTALBITAL, ACETAMINOPHEN AND CAFFEINE 50; 325; 40 MG/1; MG/1; MG/1
TABLET ORAL
Qty: 15 TABLET | Refills: 3 | Status: CANCELLED | OUTPATIENT
Start: 2023-08-01

## 2023-08-01 RX ORDER — ALPRAZOLAM 1 MG/1
TABLET ORAL
COMMUNITY
Start: 2023-07-06 | End: 2023-08-01 | Stop reason: SDUPTHER

## 2023-08-01 RX ORDER — TIZANIDINE 4 MG/1
6 TABLET ORAL NIGHTLY
Qty: 45 TABLET | Refills: 4 | Status: CANCELLED | OUTPATIENT
Start: 2023-08-01

## 2023-08-01 ASSESSMENT — PATIENT HEALTH QUESTIONNAIRE - PHQ9
1. LITTLE INTEREST OR PLEASURE IN DOING THINGS: 0
8. MOVING OR SPEAKING SO SLOWLY THAT OTHER PEOPLE COULD HAVE NOTICED. OR THE OPPOSITE, BEING SO FIGETY OR RESTLESS THAT YOU HAVE BEEN MOVING AROUND A LOT MORE THAN USUAL: 0
6. FEELING BAD ABOUT YOURSELF - OR THAT YOU ARE A FAILURE OR HAVE LET YOURSELF OR YOUR FAMILY DOWN: 0
SUM OF ALL RESPONSES TO PHQ QUESTIONS 1-9: 4
9. THOUGHTS THAT YOU WOULD BE BETTER OFF DEAD, OR OF HURTING YOURSELF: 0
SUM OF ALL RESPONSES TO PHQ9 QUESTIONS 1 & 2: 0
SUM OF ALL RESPONSES TO PHQ QUESTIONS 1-9: 4
10. IF YOU CHECKED OFF ANY PROBLEMS, HOW DIFFICULT HAVE THESE PROBLEMS MADE IT FOR YOU TO DO YOUR WORK, TAKE CARE OF THINGS AT HOME, OR GET ALONG WITH OTHER PEOPLE: 0
SUM OF ALL RESPONSES TO PHQ QUESTIONS 1-9: 4
SUM OF ALL RESPONSES TO PHQ QUESTIONS 1-9: 4
5. POOR APPETITE OR OVEREATING: 1
7. TROUBLE CONCENTRATING ON THINGS, SUCH AS READING THE NEWSPAPER OR WATCHING TELEVISION: 0
4. FEELING TIRED OR HAVING LITTLE ENERGY: 3
2. FEELING DOWN, DEPRESSED OR HOPELESS: 0
3. TROUBLE FALLING OR STAYING ASLEEP: 0

## 2023-08-01 ASSESSMENT — ENCOUNTER SYMPTOMS: RESPIRATORY NEGATIVE: 1

## 2023-08-01 NOTE — ASSESSMENT & PLAN NOTE
Monitored by specialist- no acute findings meriting change in the plan  Refill of motrin requested. Other meds mgmt is by neuro.

## 2023-08-01 NOTE — PROGRESS NOTES
Jordi Hernandez (:  1980) is a 37 y.o. female,Established patient, here for evaluation of the following chief complaint(s):  Follow-up (No questions or concerns /Has been tired a lot lately )         ASSESSMENT/PLAN:  1. Moderate episode of recurrent major depressive disorder (HCC)  -     sertraline (ZOLOFT) 100 MG tablet; TAKE 1 TABLET BY MOUTH EVERY DAY, Disp-90 tablet, R-1Normal  2. RC (generalized anxiety disorder)  -     ALPRAZolam (XANAX) 1 MG tablet; TAKE 1 TABLET BY MOUTH 2 TIMES DAILY AS NEEDED FOR ANXIETY FOR UP TO 60 DAYS., Disp-60 tablet, R-1Normal  -     sertraline (ZOLOFT) 100 MG tablet; TAKE 1 TABLET BY MOUTH EVERY DAY, Disp-90 tablet, R-1Normal  3. Chronic nonintractable headache, unspecified headache type  Assessment & Plan:   Monitored by specialist- no acute findings meriting change in the plan  Refill of motrin requested. Other meds mgmt is by neuro. Orders:  -     ibuprofen (ADVIL;MOTRIN) 600 MG tablet; TAKE 1 TABLET BY MOUTH THREE TIMES A DAY AS NEEDED FOR PAIN, Disp-60 tablet, R-1Normal  4. Anxiety  Assessment & Plan:   Well-controlled, continue current medications  5. Severe episode of recurrent major depressive disorder, without psychotic features ([address])  Assessment & Plan:   Well-controlled, continue current medications  Needs refills of xanax and sertraline. No current concerns. Meds refilled. FU with PCP as needed. Return in about 3 months (around 2023) for with PCP. Controlled Substance Monitoring:    Acute and Chronic Pain Monitoring:   RX Monitoring 2023   Periodic Controlled Substance Monitoring Possible medication side effects, risk of tolerance/dependence & alternative treatments discussed. ;No signs of potential drug abuse or diversion identified. ;Assessed functional status.            Subjective   SUBJECTIVE/OBJECTIVE:  HPI  Anxiety   Well-controlled, continue current medications    Headache   Monitored by specialist- no acute findings meriting

## 2023-08-01 NOTE — ASSESSMENT & PLAN NOTE
Well-controlled, continue current medications  Needs refills of xanax and sertraline. No current concerns.

## 2023-08-01 NOTE — PATIENT INSTRUCTIONS
We are committed to providing you the best care possible. If you receive a survey after visiting one of our offices, please take time to share your experience concerning your physician office visit. These surveys are confidential and no health information about you is shared. We are eager to improve for you and continue to give you satisfactory care, we are counting on your feedback to help make that happen. Welcome to [address] [place] [address] and Pediatrics:    Did you know we now have a faster way for you to move through your appointment? For your convenience, we now have digital registration available. When you schedule your next appointment, you will receive a link via your email as well as a text message that will allow you to complete any paperwork digitally before your appointment.

## 2023-08-03 DIAGNOSIS — R51.9 CHRONIC NONINTRACTABLE HEADACHE, UNSPECIFIED HEADACHE TYPE: ICD-10-CM

## 2023-08-03 DIAGNOSIS — G89.29 CHRONIC NONINTRACTABLE HEADACHE, UNSPECIFIED HEADACHE TYPE: ICD-10-CM

## 2023-08-03 RX ORDER — TIZANIDINE 4 MG/1
TABLET ORAL
Qty: 45 TABLET | Refills: 4 | OUTPATIENT
Start: 2023-08-03

## 2023-08-09 DIAGNOSIS — R51.9 CHRONIC NONINTRACTABLE HEADACHE, UNSPECIFIED HEADACHE TYPE: Primary | ICD-10-CM

## 2023-08-09 DIAGNOSIS — G89.29 CHRONIC NONINTRACTABLE HEADACHE, UNSPECIFIED HEADACHE TYPE: Primary | ICD-10-CM

## 2023-08-09 RX ORDER — ATOGEPANT 60 MG/1
60 TABLET ORAL DAILY
Qty: 30 TABLET | Refills: 11 | Status: SHIPPED | OUTPATIENT
Start: 2023-08-09

## 2023-09-05 DIAGNOSIS — R51.9 CHRONIC NONINTRACTABLE HEADACHE, UNSPECIFIED HEADACHE TYPE: ICD-10-CM

## 2023-09-05 DIAGNOSIS — G89.29 CHRONIC NONINTRACTABLE HEADACHE, UNSPECIFIED HEADACHE TYPE: ICD-10-CM

## 2023-09-06 DIAGNOSIS — G89.29 CHRONIC NONINTRACTABLE HEADACHE, UNSPECIFIED HEADACHE TYPE: ICD-10-CM

## 2023-09-06 DIAGNOSIS — R51.9 CHRONIC NONINTRACTABLE HEADACHE, UNSPECIFIED HEADACHE TYPE: ICD-10-CM

## 2023-09-06 RX ORDER — BUTALBITAL, ACETAMINOPHEN AND CAFFEINE 50; 325; 40 MG/1; MG/1; MG/1
TABLET ORAL
Qty: 15 TABLET | Refills: 3 | OUTPATIENT
Start: 2023-09-06

## 2023-09-06 NOTE — TELEPHONE ENCOUNTER
Received refill request for Fioricet rx. Is 15 tabs supposed to be 30 day supply? Last rx written 7/11/23 for #15 x 3 refills.

## 2023-09-07 DIAGNOSIS — R51.9 CHRONIC NONINTRACTABLE HEADACHE, UNSPECIFIED HEADACHE TYPE: ICD-10-CM

## 2023-09-07 DIAGNOSIS — G89.29 CHRONIC NONINTRACTABLE HEADACHE, UNSPECIFIED HEADACHE TYPE: ICD-10-CM

## 2023-09-07 RX ORDER — BUTALBITAL, ACETAMINOPHEN AND CAFFEINE 50; 325; 40 MG/1; MG/1; MG/1
TABLET ORAL
Qty: 15 TABLET | Refills: 3 | OUTPATIENT
Start: 2023-09-07

## 2023-09-08 DIAGNOSIS — R51.9 CHRONIC NONINTRACTABLE HEADACHE, UNSPECIFIED HEADACHE TYPE: ICD-10-CM

## 2023-09-08 DIAGNOSIS — G89.29 CHRONIC NONINTRACTABLE HEADACHE, UNSPECIFIED HEADACHE TYPE: ICD-10-CM

## 2023-09-08 RX ORDER — BUTALBITAL, ACETAMINOPHEN AND CAFFEINE 50; 325; 40 MG/1; MG/1; MG/1
TABLET ORAL
Qty: 15 TABLET | Refills: 3 | Status: SHIPPED | OUTPATIENT
Start: 2023-09-08

## 2023-09-08 NOTE — TELEPHONE ENCOUNTER
Fioricet has been refused several times due to trying to fill too soon but pharmacy keeps re-sending request. Are you willing to send new rx specifying that the quantity of 15 is supposed to last 30 days? Please advise.

## 2023-09-29 DIAGNOSIS — G89.29 CHRONIC NONINTRACTABLE HEADACHE, UNSPECIFIED HEADACHE TYPE: ICD-10-CM

## 2023-09-29 DIAGNOSIS — R51.9 CHRONIC NONINTRACTABLE HEADACHE, UNSPECIFIED HEADACHE TYPE: ICD-10-CM

## 2023-09-29 RX ORDER — IBUPROFEN 600 MG/1
TABLET ORAL
Qty: 60 TABLET | Refills: 1 | Status: SHIPPED | OUTPATIENT
Start: 2023-09-29

## 2023-10-09 ENCOUNTER — TELEMEDICINE (OUTPATIENT)
Dept: PRIMARY CARE CLINIC | Age: 43
End: 2023-10-09
Payer: COMMERCIAL

## 2023-10-09 DIAGNOSIS — L50.8 ACUTE URTICARIA: Primary | ICD-10-CM

## 2023-10-09 PROCEDURE — 99212 OFFICE O/P EST SF 10 MIN: CPT | Performed by: NURSE PRACTITIONER

## 2023-10-09 RX ORDER — TRIAMCINOLONE ACETONIDE 1 MG/G
CREAM TOPICAL
Qty: 45 G | Refills: 1 | Status: SHIPPED | OUTPATIENT
Start: 2023-10-09

## 2023-10-19 ENCOUNTER — OFFICE VISIT (OUTPATIENT)
Dept: FAMILY MEDICINE CLINIC | Age: 43
End: 2023-10-19
Payer: COMMERCIAL

## 2023-10-19 VITALS
SYSTOLIC BLOOD PRESSURE: 122 MMHG | OXYGEN SATURATION: 96 % | DIASTOLIC BLOOD PRESSURE: 70 MMHG | WEIGHT: 221.6 LBS | HEART RATE: 82 BPM | RESPIRATION RATE: 16 BRPM | BODY MASS INDEX: 36.88 KG/M2

## 2023-10-19 DIAGNOSIS — R53.83 OTHER FATIGUE: ICD-10-CM

## 2023-10-19 DIAGNOSIS — R21 RASH: Primary | ICD-10-CM

## 2023-10-19 PROCEDURE — 36415 COLL VENOUS BLD VENIPUNCTURE: CPT | Performed by: PHYSICIAN ASSISTANT

## 2023-10-19 PROCEDURE — 99213 OFFICE O/P EST LOW 20 MIN: CPT | Performed by: PHYSICIAN ASSISTANT

## 2023-10-19 RX ORDER — PREDNISONE 20 MG/1
20 TABLET ORAL 2 TIMES DAILY
Qty: 10 TABLET | Refills: 0 | Status: SHIPPED | OUTPATIENT
Start: 2023-10-19 | End: 2023-10-24

## 2023-10-19 ASSESSMENT — PATIENT HEALTH QUESTIONNAIRE - PHQ9
10. IF YOU CHECKED OFF ANY PROBLEMS, HOW DIFFICULT HAVE THESE PROBLEMS MADE IT FOR YOU TO DO YOUR WORK, TAKE CARE OF THINGS AT HOME, OR GET ALONG WITH OTHER PEOPLE: 1
9. THOUGHTS THAT YOU WOULD BE BETTER OFF DEAD, OR OF HURTING YOURSELF: 0
SUM OF ALL RESPONSES TO PHQ9 QUESTIONS 1 & 2: 3
SUM OF ALL RESPONSES TO PHQ QUESTIONS 1-9: 10
8. MOVING OR SPEAKING SO SLOWLY THAT OTHER PEOPLE COULD HAVE NOTICED. OR THE OPPOSITE, BEING SO FIGETY OR RESTLESS THAT YOU HAVE BEEN MOVING AROUND A LOT MORE THAN USUAL: 0
3. TROUBLE FALLING OR STAYING ASLEEP: 3
5. POOR APPETITE OR OVEREATING: 0
SUM OF ALL RESPONSES TO PHQ QUESTIONS 1-9: 10
6. FEELING BAD ABOUT YOURSELF - OR THAT YOU ARE A FAILURE OR HAVE LET YOURSELF OR YOUR FAMILY DOWN: 0
4. FEELING TIRED OR HAVING LITTLE ENERGY: 3
1. LITTLE INTEREST OR PLEASURE IN DOING THINGS: 3
7. TROUBLE CONCENTRATING ON THINGS, SUCH AS READING THE NEWSPAPER OR WATCHING TELEVISION: 1
2. FEELING DOWN, DEPRESSED OR HOPELESS: 0
SUM OF ALL RESPONSES TO PHQ QUESTIONS 1-9: 10
SUM OF ALL RESPONSES TO PHQ QUESTIONS 1-9: 10

## 2023-10-19 ASSESSMENT — ENCOUNTER SYMPTOMS
STRIDOR: 1
GASTROINTESTINAL NEGATIVE: 1

## 2023-10-19 ASSESSMENT — COLUMBIA-SUICIDE SEVERITY RATING SCALE - C-SSRS
4. HAVE YOU HAD THESE THOUGHTS AND HAD SOME INTENTION OF ACTING ON THEM?: NO
7. DID THIS OCCUR IN THE LAST THREE MONTHS: NO
5. HAVE YOU STARTED TO WORK OUT OR WORKED OUT THE DETAILS OF HOW TO KILL YOURSELF? DO YOU INTEND TO CARRY OUT THIS PLAN?: NO
3. HAVE YOU BEEN THINKING ABOUT HOW YOU MIGHT KILL YOURSELF?: NO

## 2023-10-19 NOTE — PROGRESS NOTES
Ellen Alexis  1980  37 y.o.  female    SUBJECTIVE:    Chief Complaint   Patient presents with    Other     Has been sick with sinus infection then started breaking out with hives felt like she scratched so much broke blood vessels . [address]  call and was given steroid cream but  still having issues       Flu Vaccine     Declined flu vaccine at this time       HPI  Pt here today for recheck. Pt states she started with rash across left buttock three weeks. No pain or burning at that time. Did Telehealth and given steroid cream. Pt states rash is more hive like, usually across left buttock but can also occur on right buttock/under right breast/right lower abdomen. Pt states rash is red/raised/hive-like. Worse with scratching . Will improve at time but returns frequently. Pt states she has had no new exposures with soap/shampoo/detergent/etc... does report being very stressed over past few months. Would like TSH done today also for increased fatigue/feeling tired constantly. 10/19/2023     1:28 PM 8/1/2023     3:09 PM 4/4/2023     7:58 AM 12/20/2022     2:37 PM 3/3/2022     8:10 AM 7/13/2021     3:05 PM 6/22/2021    10:46 AM   PHQ Scores   PHQ2 Score 3 0 0 4 6 6 0   PHQ9 Score 10 4 0 19 14 18 0     Interpretation of Total Score Depression Severity: 1-4 = Minimal depression, 5-9 = Mild depression, 10-14 = Moderate depression, 15-19 = Moderately severe depression, 20-27 = Severe depression     Current Outpatient Medications on File Prior to Visit   Medication Sig Dispense Refill    triamcinolone (KENALOG) 0.1 % cream Apply topically 2 times daily. 45 g 1    ibuprofen (ADVIL;MOTRIN) 600 MG tablet TAKE 1 TABLET BY MOUTH THREE TIMES A DAY AS NEEDED FOR PAIN 60 tablet 1    butalbital-acetaminophen-caffeine (FIORICET, ESGIC) -40 MG per tablet TAKE 1 TABLET BY MOUTH EVERY 6 HOURS AS NEEDED FOR MIGRAINE.  MAX OF 4 PER DAY 15 tablet 3    Atogepant (QULIPTA) 60 MG TABS Take 60 mg by mouth daily 30

## 2023-10-19 NOTE — ASSESSMENT & PLAN NOTE
Steroid as directed, pepcid 20 mg bid, atarax prn-pt has at home.  If not improving in next 1-2 weeks, f/u for recheck

## 2023-10-19 NOTE — PATIENT INSTRUCTIONS
We are committed to providing you the best care possible. If you receive a survey after visiting one of our offices, please take time to share your experience concerning your physician office visit. These surveys are confidential and no health information about you is shared. We are eager to improve for you and continue to give you satisfactory care, we are counting on your feedback to help make that happen. Welcome to [address] [place] [address] and [facility]:    Did you know we now have a faster way for you to move through your appointment? For your convenience, we now have digital registration available. When you schedule your next appointment, you will receive a link via your email as well as a text message that will allow you to complete any paperwork digitally before your appointment. We are committed to providing you the best care possible. If you receive a survey after visiting one of our offices, please take time to share your experience concerning your physician office visit. These surveys are confidential and no health information about you is shared. We are eager to improve for you and continue to give you satisfactory care, we are counting on your feedback to help make that happen. Welcome to [address] [place] [address] and [facility]:    Did you know we now have a faster way for you to move through your appointment? For your convenience, we now have digital registration available. When you schedule your next appointment, you will receive a link via your email as well as a text message that will allow you to complete any paperwork digitally before your appointment.

## 2023-10-20 DIAGNOSIS — R40.0 DAYTIME SOMNOLENCE: Primary | ICD-10-CM

## 2023-10-20 LAB — TSH SERPL DL<=0.005 MIU/L-ACNC: 1.68 UIU/ML (ref 0.27–4.2)

## 2023-11-01 ENCOUNTER — OFFICE VISIT (OUTPATIENT)
Dept: FAMILY MEDICINE CLINIC | Age: 43
End: 2023-11-01
Payer: COMMERCIAL

## 2023-11-01 VITALS
SYSTOLIC BLOOD PRESSURE: 128 MMHG | HEIGHT: 64 IN | BODY MASS INDEX: 37.8 KG/M2 | HEART RATE: 76 BPM | WEIGHT: 221.4 LBS | RESPIRATION RATE: 16 BRPM | OXYGEN SATURATION: 96 % | DIASTOLIC BLOOD PRESSURE: 72 MMHG

## 2023-11-01 DIAGNOSIS — G89.29 CHRONIC NONINTRACTABLE HEADACHE, UNSPECIFIED HEADACHE TYPE: ICD-10-CM

## 2023-11-01 DIAGNOSIS — F41.9 ANXIETY: Primary | ICD-10-CM

## 2023-11-01 DIAGNOSIS — R51.9 CHRONIC NONINTRACTABLE HEADACHE, UNSPECIFIED HEADACHE TYPE: ICD-10-CM

## 2023-11-01 PROCEDURE — 99213 OFFICE O/P EST LOW 20 MIN: CPT | Performed by: PHYSICIAN ASSISTANT

## 2023-11-01 RX ORDER — ALPRAZOLAM 1 MG/1
1 TABLET ORAL
COMMUNITY
Start: 2023-09-17 | End: 2023-11-01 | Stop reason: SDUPTHER

## 2023-11-01 RX ORDER — ALPRAZOLAM 1 MG/1
1 TABLET ORAL 2 TIMES DAILY PRN
Qty: 60 TABLET | Refills: 2 | Status: SHIPPED | OUTPATIENT
Start: 2023-11-01 | End: 2024-01-30

## 2023-11-01 RX ORDER — IBUPROFEN 600 MG/1
600 TABLET ORAL EVERY 6 HOURS PRN
Qty: 120 TABLET | Refills: 5 | Status: SHIPPED | OUTPATIENT
Start: 2023-11-01

## 2023-11-01 ASSESSMENT — PATIENT HEALTH QUESTIONNAIRE - PHQ9
SUM OF ALL RESPONSES TO PHQ QUESTIONS 1-9: 9
SUM OF ALL RESPONSES TO PHQ9 QUESTIONS 1 & 2: 0
9. THOUGHTS THAT YOU WOULD BE BETTER OFF DEAD, OR OF HURTING YOURSELF: 0
SUM OF ALL RESPONSES TO PHQ QUESTIONS 1-9: 9
4. FEELING TIRED OR HAVING LITTLE ENERGY: 3
7. TROUBLE CONCENTRATING ON THINGS, SUCH AS READING THE NEWSPAPER OR WATCHING TELEVISION: 0
1. LITTLE INTEREST OR PLEASURE IN DOING THINGS: 0
5. POOR APPETITE OR OVEREATING: 3
2. FEELING DOWN, DEPRESSED OR HOPELESS: 0
SUM OF ALL RESPONSES TO PHQ QUESTIONS 1-9: 9
6. FEELING BAD ABOUT YOURSELF - OR THAT YOU ARE A FAILURE OR HAVE LET YOURSELF OR YOUR FAMILY DOWN: 0
8. MOVING OR SPEAKING SO SLOWLY THAT OTHER PEOPLE COULD HAVE NOTICED. OR THE OPPOSITE, BEING SO FIGETY OR RESTLESS THAT YOU HAVE BEEN MOVING AROUND A LOT MORE THAN USUAL: 0
3. TROUBLE FALLING OR STAYING ASLEEP: 3
10. IF YOU CHECKED OFF ANY PROBLEMS, HOW DIFFICULT HAVE THESE PROBLEMS MADE IT FOR YOU TO DO YOUR WORK, TAKE CARE OF THINGS AT HOME, OR GET ALONG WITH OTHER PEOPLE: 0
SUM OF ALL RESPONSES TO PHQ QUESTIONS 1-9: 9

## 2023-11-01 ASSESSMENT — COLUMBIA-SUICIDE SEVERITY RATING SCALE - C-SSRS
3. HAVE YOU BEEN THINKING ABOUT HOW YOU MIGHT KILL YOURSELF?: NO
5. HAVE YOU STARTED TO WORK OUT OR WORKED OUT THE DETAILS OF HOW TO KILL YOURSELF? DO YOU INTEND TO CARRY OUT THIS PLAN?: NO
4. HAVE YOU HAD THESE THOUGHTS AND HAD SOME INTENTION OF ACTING ON THEM?: NO
7. DID THIS OCCUR IN THE LAST THREE MONTHS: NO

## 2023-11-01 ASSESSMENT — ENCOUNTER SYMPTOMS: RESPIRATORY NEGATIVE: 1

## 2023-11-01 NOTE — PROGRESS NOTES
Lindy Acosta  1980  37 y.o.  female    SUBJECTIVE:    Chief Complaint   Patient presents with    Follow-up     No questions or concerns     Flu Vaccine     Declined flu vaccine        HPI  Anxiety-chronic, using xanax prn. Aware of risks/side effects    Headaches-chronic, ibuprofen helpful        11/1/2023     7:52 AM 10/19/2023     1:28 PM 8/1/2023     3:09 PM 4/4/2023     7:58 AM 12/20/2022     2:37 PM 3/3/2022     8:10 AM 7/13/2021     3:05 PM   PHQ Scores   PHQ2 Score 0 3 0 0 4 6 6   PHQ9 Score 9 10 4 0 19 14 18     Interpretation of Total Score Depression Severity: 1-4 = Minimal depression, 5-9 = Mild depression, 10-14 = Moderate depression, 15-19 = Moderately severe depression, 20-27 = Severe depression     Current Outpatient Medications on File Prior to Visit   Medication Sig Dispense Refill    triamcinolone (KENALOG) 0.1 % cream Apply topically 2 times daily. 45 g 1    butalbital-acetaminophen-caffeine (FIORICET, ESGIC) -40 MG per tablet TAKE 1 TABLET BY MOUTH EVERY 6 HOURS AS NEEDED FOR MIGRAINE. MAX OF 4 PER DAY 15 tablet 3    Atogepant (QULIPTA) 60 MG TABS Take 60 mg by mouth daily 30 tablet 11    sertraline (ZOLOFT) 100 MG tablet TAKE 1 TABLET BY MOUTH EVERY DAY 90 tablet 1    tiZANidine (ZANAFLEX) 4 MG tablet Take 1.5 tablets by mouth nightly 45 tablet 4    Spacer/Aero-Holding Chambers WILLIAM 1 Device by Does not apply route daily as needed (with inhaler) 1 each 0    budesonide (PULMICORT FLEXHALER) 180 MCG/ACT AEPB inhaler Inhale 2 puffs into the lungs in the morning and 2 puffs in the evening. 1 each 5    albuterol sulfate HFA (VENTOLIN HFA) 108 (90 Base) MCG/ACT inhaler Inhale 2 puffs into the lungs 4 times daily as needed for Wheezing (Patient taking differently: Inhale 2 puffs into the lungs 4 times daily as needed for Wheezing prn) 18 g 0     No current facility-administered medications on file prior to visit.        No Known Allergies    Past Medical History:   Diagnosis Date    Acute

## 2023-11-01 NOTE — PATIENT INSTRUCTIONS
We are committed to providing you the best care possible. If you receive a survey after visiting one of our offices, please take time to share your experience concerning your physician office visit. These surveys are confidential and no health information about you is shared. We are eager to improve for you and continue to give you satisfactory care, we are counting on your feedback to help make that happen. Welcome to [address] [place] [address] and Pediatrics:    Did you know we now have a faster way for you to move through your appointment? For your convenience, we now have digital registration available. When you schedule your next appointment, you will receive a link via your email as well as a text message that will allow you to complete any paperwork digitally before your appointment.

## 2023-11-10 DIAGNOSIS — G89.29 CHRONIC NONINTRACTABLE HEADACHE, UNSPECIFIED HEADACHE TYPE: ICD-10-CM

## 2023-11-10 DIAGNOSIS — R51.9 CHRONIC NONINTRACTABLE HEADACHE, UNSPECIFIED HEADACHE TYPE: ICD-10-CM

## 2023-11-10 RX ORDER — BUTALBITAL, ACETAMINOPHEN AND CAFFEINE 50; 325; 40 MG/1; MG/1; MG/1
TABLET ORAL
Qty: 15 TABLET | Refills: 3 | Status: SHIPPED | OUTPATIENT
Start: 2023-11-10

## 2023-11-13 ENCOUNTER — HOSPITAL ENCOUNTER (EMERGENCY)
Age: 43
Discharge: HOME | End: 2023-11-13
Attending: EMERGENCY MEDICINE
Payer: COMMERCIAL

## 2023-11-13 ENCOUNTER — APPOINTMENT (OUTPATIENT)
Dept: CT IMAGING | Age: 43
End: 2023-11-13
Attending: EMERGENCY MEDICINE
Payer: COMMERCIAL

## 2023-11-13 VITALS
HEIGHT: 55 IN | RESPIRATION RATE: 23 BRPM | OXYGEN SATURATION: 97 % | DIASTOLIC BLOOD PRESSURE: 48 MMHG | HEART RATE: 67 BPM | SYSTOLIC BLOOD PRESSURE: 95 MMHG | TEMPERATURE: 97.6 F | WEIGHT: 293 LBS | BODY MASS INDEX: 67.81 KG/M2

## 2023-11-13 DIAGNOSIS — R47.1 DYSARTHRIA: Primary | ICD-10-CM

## 2023-11-13 DIAGNOSIS — Z79.899 POLYPHARMACY: ICD-10-CM

## 2023-11-13 DIAGNOSIS — I95.9 HYPOTENSION, UNSPECIFIED HYPOTENSION TYPE: ICD-10-CM

## 2023-11-13 LAB
ACETAMINOPHEN LEVEL: <5 UG/ML (ref 15–30)
ALBUMIN SERPL-MCNC: 3.4 GM/DL (ref 3.4–5)
ALCOHOL SCREEN SERUM: <0.01 %WT/VOL
ALP BLD-CCNC: 107 IU/L (ref 40–129)
ALT SERPL-CCNC: 29 U/L (ref 10–40)
AMPHETAMINES: NEGATIVE
ANION GAP SERPL CALCULATED.3IONS-SCNC: 11 MMOL/L (ref 4–16)
AST SERPL-CCNC: 12 IU/L (ref 15–37)
BACTERIA: ABNORMAL /HPF
BARBITURATE SCREEN URINE: ABNORMAL
BASOPHILS ABSOLUTE: 0.1 K/CU MM
BASOPHILS RELATIVE PERCENT: 0.4 % (ref 0–1)
BENZODIAZEPINE SCREEN, URINE: ABNORMAL
BILIRUB SERPL-MCNC: 0.2 MG/DL (ref 0–1)
BILIRUBIN URINE: NEGATIVE MG/DL
BLOOD, URINE: NEGATIVE
BUN SERPL-MCNC: 15 MG/DL (ref 6–23)
CALCIUM SERPL-MCNC: 8.6 MG/DL (ref 8.3–10.6)
CANNABINOID SCREEN URINE: NEGATIVE
CAST TYPE: ABNORMAL /HPF
CHLORIDE BLD-SCNC: 106 MMOL/L (ref 99–110)
CLARITY: CLEAR
CO2: 22 MMOL/L (ref 21–32)
COCAINE METABOLITE: NEGATIVE
COLOR: YELLOW
CREAT SERPL-MCNC: 0.7 MG/DL (ref 0.6–1.1)
CRYSTAL TYPE: ABNORMAL /HPF
DIFFERENTIAL TYPE: ABNORMAL
DOSE AMOUNT: ABNORMAL
DOSE AMOUNT: ABNORMAL
DOSE TIME: ABNORMAL
DOSE TIME: ABNORMAL
EKG ATRIAL RATE: 62 BPM
EKG DIAGNOSIS: NORMAL
EKG P AXIS: 28 DEGREES
EKG P-R INTERVAL: 174 MS
EKG Q-T INTERVAL: 430 MS
EKG QRS DURATION: 86 MS
EKG QTC CALCULATION (BAZETT): 436 MS
EKG R AXIS: 38 DEGREES
EKG T AXIS: 39 DEGREES
EKG VENTRICULAR RATE: 62 BPM
EOSINOPHILS ABSOLUTE: 0.3 K/CU MM
EOSINOPHILS RELATIVE PERCENT: 2.5 % (ref 0–3)
EPITHELIAL CELLS, UA: 8 /HPF
FENTANYL URINE: NEGATIVE
GFR SERPL CREATININE-BSD FRML MDRD: >60 ML/MIN/1.73M2
GLUCOSE SERPL-MCNC: 110 MG/DL (ref 70–99)
GLUCOSE, URINE: NEGATIVE MG/DL
HCT VFR BLD CALC: 39.9 % (ref 37–47)
HEMOGLOBIN: 13 GM/DL (ref 12.5–16)
IMMATURE NEUTROPHIL %: 0.7 % (ref 0–0.43)
INTERPRETATION: NORMAL
KETONES, URINE: NEGATIVE MG/DL
LACTIC ACID, SEPSIS: 1.4 MMOL/L (ref 0.4–2)
LEUKOCYTE ESTERASE, URINE: ABNORMAL
LYMPHOCYTES ABSOLUTE: 2.4 K/CU MM
LYMPHOCYTES RELATIVE PERCENT: 20.1 % (ref 24–44)
MCH RBC QN AUTO: 29.7 PG (ref 27–31)
MCHC RBC AUTO-ENTMCNC: 32.6 % (ref 32–36)
MCV RBC AUTO: 91.1 FL (ref 78–100)
MONOCYTES ABSOLUTE: 0.5 K/CU MM
MONOCYTES RELATIVE PERCENT: 4.2 % (ref 0–4)
NITRITE URINE, QUANTITATIVE: NEGATIVE
OPIATES, URINE: NEGATIVE
OXYCODONE: NEGATIVE
PDW BLD-RTO: 13.4 % (ref 11.7–14.9)
PH, URINE: 6 (ref 5–8)
PLATELET # BLD: 276 K/CU MM (ref 140–440)
PMV BLD AUTO: 9.8 FL (ref 7.5–11.1)
POTASSIUM SERPL-SCNC: 3.7 MMOL/L (ref 3.5–5.1)
PREGNANCY, URINE: NEGATIVE
PROTEIN UA: ABNORMAL MG/DL
RBC # BLD: 4.38 M/CU MM (ref 4.2–5.4)
RBC URINE: 0 /HPF (ref 0–6)
SALICYLATE LEVEL: <0.3 MG/DL (ref 15–30)
SEGMENTED NEUTROPHILS ABSOLUTE COUNT: 8.5 K/CU MM
SEGMENTED NEUTROPHILS RELATIVE PERCENT: 72.1 % (ref 36–66)
SODIUM BLD-SCNC: 139 MMOL/L (ref 135–145)
SPECIFIC GRAVITY UA: 1.02 (ref 1–1.03)
TOTAL IMMATURE NEUTOROPHIL: 0.08 K/CU MM
TOTAL PROTEIN: 6.2 GM/DL (ref 6.4–8.2)
UROBILINOGEN, URINE: 0.2 MG/DL (ref 0.2–1)
WBC # BLD: 11.7 K/CU MM (ref 4–10.5)
WBC UA: 6 /HPF (ref 0–5)

## 2023-11-13 PROCEDURE — G0480 DRUG TEST DEF 1-7 CLASSES: HCPCS

## 2023-11-13 PROCEDURE — 70450 CT HEAD/BRAIN W/O DYE: CPT

## 2023-11-13 PROCEDURE — 80053 COMPREHEN METABOLIC PANEL: CPT

## 2023-11-13 PROCEDURE — 85025 COMPLETE CBC W/AUTO DIFF WBC: CPT

## 2023-11-13 PROCEDURE — 99284 EMERGENCY DEPT VISIT MOD MDM: CPT

## 2023-11-13 PROCEDURE — 81025 URINE PREGNANCY TEST: CPT

## 2023-11-13 PROCEDURE — 80307 DRUG TEST PRSMV CHEM ANLYZR: CPT

## 2023-11-13 PROCEDURE — 81001 URINALYSIS AUTO W/SCOPE: CPT

## 2023-11-13 PROCEDURE — 93010 ELECTROCARDIOGRAM REPORT: CPT | Performed by: INTERNAL MEDICINE

## 2023-11-13 PROCEDURE — 2580000003 HC RX 258: Performed by: EMERGENCY MEDICINE

## 2023-11-13 PROCEDURE — 93005 ELECTROCARDIOGRAM TRACING: CPT | Performed by: EMERGENCY MEDICINE

## 2023-11-13 PROCEDURE — 83605 ASSAY OF LACTIC ACID: CPT

## 2023-11-13 RX ORDER — SODIUM CHLORIDE, SODIUM LACTATE, POTASSIUM CHLORIDE, CALCIUM CHLORIDE 600; 310; 30; 20 MG/100ML; MG/100ML; MG/100ML; MG/100ML
1000 INJECTION, SOLUTION INTRAVENOUS ONCE
Status: COMPLETED | OUTPATIENT
Start: 2023-11-13 | End: 2023-11-13

## 2023-11-13 RX ADMIN — SODIUM CHLORIDE, POTASSIUM CHLORIDE, SODIUM LACTATE AND CALCIUM CHLORIDE 1000 ML: 600; 310; 30; 20 INJECTION, SOLUTION INTRAVENOUS at 10:48

## 2023-11-13 ASSESSMENT — PATIENT HEALTH QUESTIONNAIRE - PHQ9
SUM OF ALL RESPONSES TO PHQ9 QUESTIONS 1 & 2: 0
SUM OF ALL RESPONSES TO PHQ QUESTIONS 1-9: 0
2. FEELING DOWN, DEPRESSED OR HOPELESS: 0
1. LITTLE INTEREST OR PLEASURE IN DOING THINGS: 0

## 2023-11-13 ASSESSMENT — LIFESTYLE VARIABLES
HOW MANY STANDARD DRINKS CONTAINING ALCOHOL DO YOU HAVE ON A TYPICAL DAY: 1 OR 2
HOW OFTEN DO YOU HAVE A DRINK CONTAINING ALCOHOL: MONTHLY OR LESS

## 2023-11-13 ASSESSMENT — PAIN - FUNCTIONAL ASSESSMENT: PAIN_FUNCTIONAL_ASSESSMENT: NONE - DENIES PAIN

## 2023-11-13 NOTE — ED PROVIDER NOTES
Triage Chief Complaint:   Dizziness (C/o dizziness , slurred words and weakness off and on since Tuesday. Pt states this episode started after she got to work around 8:30. Alert to time and place)    CRISTIANE Pulido is a 37 y.o. female that presents to the ED with complaint of being lightheaded nests and problems speaking. Patient is a 57-year-old female take Xanax milligram twice daily for several years however she tells me she does not take and it on a regular basis although she does fill it every month she is also on Xanax and she takes Fioricet. Patient is somnolent slurring her words. She has no complaint of headache no problems with numbness tingling or weakness. No known history of TIA CVA. Patient denies being depressed she became upset when he questioned if she was taking meds to harm herself.   Despite being on multiple meds for anxiety she is never been sent for counseling interestingly        Past Medical History:   Diagnosis Date    Acute bronchitis due to infection 12/21/2022    Acute suppurative otitis media without spontaneous rupture of ear drum, unspecified ear 11/19/2021    Anxiety disorder, unspecified 10/19/2020    Headache     migraines    Impacted cerumen, right ear 11/5/2021    Lumbar sprain      Past Surgical History:   Procedure Laterality Date    ABDOMINAL EXPLORATION SURGERY      BUNIONECTOMY Right     EAR SURGERY      EAR SURGERY Right     reconstrution    TUBAL LIGATION       Family History   Problem Relation Age of Onset    High Blood Pressure Mother      Social History     Socioeconomic History    Marital status:      Spouse name: Not on file    Number of children: Not on file    Years of education: Not on file    Highest education level: Not on file   Occupational History    Not on file   Tobacco Use    Smoking status: Former     Packs/day: 0.25     Years: 24.00     Additional pack years: 0.00     Total pack years: 6.00     Types: Cigarettes     Quit date:

## 2023-11-29 ENCOUNTER — OFFICE VISIT (OUTPATIENT)
Dept: NEUROLOGY | Age: 43
End: 2023-11-29
Payer: COMMERCIAL

## 2023-11-29 VITALS
HEART RATE: 76 BPM | WEIGHT: 218.2 LBS | OXYGEN SATURATION: 98 % | HEIGHT: 64 IN | BODY MASS INDEX: 37.25 KG/M2 | SYSTOLIC BLOOD PRESSURE: 128 MMHG | DIASTOLIC BLOOD PRESSURE: 78 MMHG

## 2023-11-29 DIAGNOSIS — G89.29 CHRONIC NONINTRACTABLE HEADACHE, UNSPECIFIED HEADACHE TYPE: Primary | ICD-10-CM

## 2023-11-29 DIAGNOSIS — M54.81 BILATERAL OCCIPITAL NEURALGIA: ICD-10-CM

## 2023-11-29 DIAGNOSIS — R51.9 CHRONIC NONINTRACTABLE HEADACHE, UNSPECIFIED HEADACHE TYPE: Primary | ICD-10-CM

## 2023-11-29 DIAGNOSIS — G43.709 CHRONIC MIGRAINE WITHOUT AURA WITHOUT STATUS MIGRAINOSUS, NOT INTRACTABLE: ICD-10-CM

## 2023-11-29 PROCEDURE — 99213 OFFICE O/P EST LOW 20 MIN: CPT | Performed by: NURSE PRACTITIONER

## 2023-11-29 RX ORDER — TIZANIDINE 4 MG/1
6 TABLET ORAL NIGHTLY
Qty: 45 TABLET | Refills: 11 | Status: SHIPPED | OUTPATIENT
Start: 2023-11-29

## 2023-11-29 NOTE — PROGRESS NOTES
11/29/23    Orlena Schwab  1980    Chief Complaint   Patient presents with    Follow-up     Pt presents for follow-up for headache. History of Present Illness  Chas Joyce is a 37 y.o. female presenting today for follow-up of: Chronic migraine with superimposed bilateral occipital neuralgia. She remains on tizanidine 6 mg at bedtime for preventative therapy and Fioricet for migraine abortive therapy. She did call in with worsening migraines, she was started on Qulipta 60 mg daily for further migraine prevention. Today, she tells me her migraines are well controlled. She did have to get her prosthesis in her ear replaced a few weeks ago. Since she has had this procedure, she has been migraine free. Current Outpatient Medications   Medication Sig Dispense Refill    butalbital-acetaminophen-caffeine (FIORICET, ESGIC) -40 MG per tablet TAKE 1 TABLET BY MOUTH EVERY 6 HOURS AS NEEDED FOR MIGRAINE. MAX OF 4 PER DAY 15 tablet 3    ibuprofen (ADVIL;MOTRIN) 600 MG tablet Take 1 tablet by mouth every 6 hours as needed for Pain 120 tablet 5    ALPRAZolam (XANAX) 1 MG tablet Take 1 tablet by mouth 2 times daily as needed for Anxiety for up to 90 days. Max Daily Amount: 2 mg 60 tablet 2    triamcinolone (KENALOG) 0.1 % cream Apply topically 2 times daily. 45 g 1    Atogepant (QULIPTA) 60 MG TABS Take 60 mg by mouth daily 30 tablet 11    sertraline (ZOLOFT) 100 MG tablet TAKE 1 TABLET BY MOUTH EVERY DAY 90 tablet 1    tiZANidine (ZANAFLEX) 4 MG tablet Take 1.5 tablets by mouth nightly 45 tablet 4    Spacer/Aero-Holding Chambers WILLIAM 1 Device by Does not apply route daily as needed (with inhaler) 1 each 0    budesonide (PULMICORT FLEXHALER) 180 MCG/ACT AEPB inhaler Inhale 2 puffs into the lungs in the morning and 2 puffs in the evening.  1 each 5    albuterol sulfate HFA (VENTOLIN HFA) 108 (90 Base) MCG/ACT inhaler Inhale 2 puffs into the lungs 4 times daily as needed for Wheezing (Patient taking

## 2023-12-04 RX ORDER — PREDNISONE 20 MG/1
TABLET ORAL
Qty: 10 TABLET | Refills: 0 | OUTPATIENT
Start: 2023-12-04

## 2024-01-08 DIAGNOSIS — F33.1 MODERATE EPISODE OF RECURRENT MAJOR DEPRESSIVE DISORDER (HCC): ICD-10-CM

## 2024-01-08 DIAGNOSIS — G89.29 CHRONIC NONINTRACTABLE HEADACHE, UNSPECIFIED HEADACHE TYPE: ICD-10-CM

## 2024-01-08 DIAGNOSIS — R51.9 CHRONIC NONINTRACTABLE HEADACHE, UNSPECIFIED HEADACHE TYPE: ICD-10-CM

## 2024-01-08 DIAGNOSIS — F41.1 GAD (GENERALIZED ANXIETY DISORDER): ICD-10-CM

## 2024-01-08 RX ORDER — SERTRALINE HYDROCHLORIDE 100 MG/1
TABLET, FILM COATED ORAL
Qty: 90 TABLET | Refills: 0 | Status: SHIPPED | OUTPATIENT
Start: 2024-01-08

## 2024-01-08 RX ORDER — BUTALBITAL, ACETAMINOPHEN AND CAFFEINE 50; 325; 40 MG/1; MG/1; MG/1
TABLET ORAL
Qty: 15 TABLET | Refills: 3 | OUTPATIENT
Start: 2024-01-08

## 2024-01-09 DIAGNOSIS — G89.29 CHRONIC NONINTRACTABLE HEADACHE, UNSPECIFIED HEADACHE TYPE: ICD-10-CM

## 2024-01-09 DIAGNOSIS — R51.9 CHRONIC NONINTRACTABLE HEADACHE, UNSPECIFIED HEADACHE TYPE: ICD-10-CM

## 2024-01-09 NOTE — TELEPHONE ENCOUNTER
Please review. Last rx written 11/10/23 #15 x 3 refills. Rx pending approval. Note changed stating intended for 30 day supply.     Requested Prescriptions     Pending Prescriptions Disp Refills    butalbital-acetaminophen-caffeine (FIORICET, ESGIC) -40 MG per tablet [Pharmacy Med Name: [id number] -40] 15 tablet 3     Sig: TAKE 1 TABLET BY MOUTH EVERY 6 HOURS AS NEEDED FOR MIGRAINE. MAX OF 4 PER DAY

## 2024-01-10 RX ORDER — BUTALBITAL, ACETAMINOPHEN AND CAFFEINE 50; 325; 40 MG/1; MG/1; MG/1
TABLET ORAL
Qty: 15 TABLET | Refills: 3 | Status: SHIPPED | OUTPATIENT
Start: 2024-01-10

## 2024-02-05 ENCOUNTER — TELEPHONE (OUTPATIENT)
Dept: FAMILY MEDICINE CLINIC | Age: 44
End: 2024-02-05

## 2024-02-05 NOTE — TELEPHONE ENCOUNTER
Pt. Would like to be advised about the weight loss shot. Pt. Requesting referral and or information of where she can go to do this. Pt. Advised provider out of office this week and will follow up upon return.

## 2024-02-12 ENCOUNTER — TELEPHONE (OUTPATIENT)
Dept: FAMILY MEDICINE CLINIC | Age: 44
End: 2024-02-12

## 2024-02-12 DIAGNOSIS — R63.5 WEIGHT GAIN: Primary | ICD-10-CM

## 2024-02-12 DIAGNOSIS — R73.9 HYPERGLYCEMIA: ICD-10-CM

## 2024-02-12 NOTE — TELEPHONE ENCOUNTER
Pt called stating her weight gain is getting out of control. She has been watching what she eats and walking every day, however, she is still gaining weight. Pt is wondering if we are able to recheck her A1C or possibly start her on weight loss medication. Please advise.

## 2024-02-12 NOTE — TELEPHONE ENCOUNTER
Spoke with patient regarding this. Pt states she would like the referral sent over as well as having the blood work done if possible.

## 2024-02-13 ENCOUNTER — TELEPHONE (OUTPATIENT)
Dept: BARIATRICS/WEIGHT MGMT | Age: 44
End: 2024-02-13

## 2024-02-16 ENCOUNTER — OFFICE VISIT MH/BH (OUTPATIENT)
Dept: BARIATRICS/WEIGHT MGMT | Age: 44
End: 2024-02-16
Payer: COMMERCIAL

## 2024-02-16 VITALS
OXYGEN SATURATION: 97 % | SYSTOLIC BLOOD PRESSURE: 106 MMHG | HEART RATE: 92 BPM | BODY MASS INDEX: 39.57 KG/M2 | HEIGHT: 64 IN | DIASTOLIC BLOOD PRESSURE: 80 MMHG | WEIGHT: 231.8 LBS

## 2024-02-16 DIAGNOSIS — Z79.899 MEDICATION MANAGEMENT: ICD-10-CM

## 2024-02-16 DIAGNOSIS — E66.9 OBESITY (BMI 30-39.9): Primary | ICD-10-CM

## 2024-02-16 PROCEDURE — 99204 OFFICE O/P NEW MOD 45 MIN: CPT | Performed by: NURSE PRACTITIONER

## 2024-02-16 RX ORDER — ALPRAZOLAM 1 MG/1
1 TABLET ORAL 2 TIMES DAILY PRN
COMMUNITY
End: 2024-02-23 | Stop reason: SDUPTHER

## 2024-02-16 RX ORDER — PHENTERMINE HYDROCHLORIDE 37.5 MG/1
37.5 TABLET ORAL
Qty: 30 TABLET | Refills: 0 | Status: SHIPPED | OUTPATIENT
Start: 2024-02-16 | End: 2024-03-17

## 2024-02-16 NOTE — PROGRESS NOTES
tablet Take 1 tablet by mouth every 6 hours as needed for Pain 120 tablet 5    triamcinolone (KENALOG) 0.1 % cream Apply topically 2 times daily. 45 g 1    Atogepant (QULIPTA) 60 MG TABS Take 60 mg by mouth daily 30 tablet 11    albuterol sulfate HFA (VENTOLIN HFA) 108 (90 Base) MCG/ACT inhaler Inhale 2 puffs into the lungs 4 times daily as needed for Wheezing (Patient taking differently: Inhale 2 puffs into the lungs 4 times daily as needed for Wheezing prn) 18 g 0    Spacer/Aero-Holding Chambers WILLIAM 1 Device by Does not apply route daily as needed (with inhaler) 1 each 0    budesonide (PULMICORT FLEXHALER) 180 MCG/ACT AEPB inhaler Inhale 2 puffs into the lungs in the morning and 2 puffs in the evening. (Patient not taking: Reported on 2/16/2024) 1 each 5     No current facility-administered medications for this visit.        No Known Allergies    Review of Systems:         Review of Systems   Psychiatric/Behavioral:  The patient is nervous/anxious.    All other systems reviewed and are negative.        OBJECTIVE:  Physical Exam:    /80 (Site: Right Upper Arm, Position: Sitting, Cuff Size: Large Adult)   Pulse 92   Ht 1.626 m (5' 4\")   Wt 105.1 kg (231 lb 12.8 oz)   SpO2 97%   BMI 39.79 kg/m²      Physical Exam  Constitutional:       Appearance: Normal appearance. She is obese.   HENT:      Head: Normocephalic.      Nose: Nose normal.      Mouth/Throat:      Pharynx: Oropharynx is clear.   Eyes:      Pupils: Pupils are equal, round, and reactive to light.   Cardiovascular:      Rate and Rhythm: Normal rate.      Pulses: Normal pulses.   Pulmonary:      Effort: Pulmonary effort is normal.   Musculoskeletal:         General: Normal range of motion.      Cervical back: Normal range of motion.   Skin:     General: Skin is warm and dry.      Capillary Refill: Capillary refill takes less than 2 seconds.   Neurological:      General: No focal deficit present.      Mental Status: She is alert and oriented to

## 2024-02-22 DIAGNOSIS — F41.9 ANXIETY DISORDER, UNSPECIFIED: ICD-10-CM

## 2024-02-22 RX ORDER — ALPRAZOLAM 1 MG/1
TABLET ORAL
Qty: 60 TABLET | Refills: 2 | OUTPATIENT
Start: 2024-02-22

## 2024-02-23 DIAGNOSIS — F41.9 ANXIETY: Primary | ICD-10-CM

## 2024-02-23 RX ORDER — ALPRAZOLAM 1 MG/1
1 TABLET ORAL 2 TIMES DAILY PRN
Qty: 36 TABLET | Refills: 0 | Status: SHIPPED | OUTPATIENT
Start: 2024-02-23 | End: 2024-03-12

## 2024-02-23 NOTE — TELEPHONE ENCOUNTER
Called and spoke with patient regarding this. Pt states she is taking this twice a day and only has 4 days left of her current prescription. Pt is hoping to either be able to get in sooner or get a refill to last until this appointment. Please advise.

## 2024-02-23 NOTE — TELEPHONE ENCOUNTER
Called patient and reviewed providers note.  Patient voices understanding.  No questions or concerns at this time

## 2024-02-29 ENCOUNTER — OFFICE VISIT (OUTPATIENT)
Dept: BARIATRICS/WEIGHT MGMT | Age: 44
End: 2024-02-29

## 2024-02-29 VITALS — WEIGHT: 223.7 LBS | HEIGHT: 64 IN | BODY MASS INDEX: 38.19 KG/M2

## 2024-02-29 DIAGNOSIS — E66.9 OBESITY (BMI 30-39.9): Primary | ICD-10-CM

## 2024-02-29 PROCEDURE — NBSRV NON-BILLABLE SERVICE: Performed by: NURSE PRACTITIONER

## 2024-02-29 NOTE — PROGRESS NOTES
Outpatient Nutrition Counseling - Medication Weight Loss Program    REASON FOR VISIT:    Chief Complaint:    Chief Complaint   Patient presents with    Weight Management       SUBJECTIVE:  Pt here for nutrition consult in medication weight management program. She has recently stopped drinking sugar filled energy drinks and has lost 8 lb.  The patient is a 43 y.o. female being seen for obesity, enrolled in Medication Weight Loss Program; Live's, Height: 162.6 cm (5' 4\"), Weight - Scale: 101.5 kg (223 lb 11.2 oz), Current Body mass index is 38.4 kg/m².  patient's PCP is Josselyn Lofton PA-C     Comorbid Conditions:  Significant diseases affecting this patient are   Past Medical History:   Diagnosis Date    Acute bronchitis due to infection 12/21/2022    Acute suppurative otitis media without spontaneous rupture of ear drum, unspecified ear 11/19/2021    Anxiety disorder, unspecified 10/19/2020    Headache     migraines    Impacted cerumen, right ear 11/5/2021    Lumbar sprain    .    Review of Systems - Review of Systems  Otherwise per HPI.    Allergies:  No Known Allergies    Past Surgical History:  Past Surgical History:   Procedure Laterality Date    ABDOMINAL EXPLORATION SURGERY      BUNIONECTOMY Right     EAR SURGERY      EAR SURGERY Right     reconstrution    TUBAL LIGATION         Family History:  Family History   Problem Relation Age of Onset    High Blood Pressure Mother     COPD Father     Emphysema Father        Social History:  Social History     Socioeconomic History    Marital status:      Spouse name: Not on file    Number of children: Not on file    Years of education: Not on file    Highest education level: Not on file   Occupational History    Not on file   Tobacco Use    Smoking status: Former     Current packs/day: 0.00     Average packs/day: 0.2 packs/day for 26.0 years (6.0 ttl pk-yrs)     Types: Cigarettes     Start date: 10/20/1997     Quit date: 10/20/2023     Years since quitting:

## 2024-03-12 ENCOUNTER — OFFICE VISIT (OUTPATIENT)
Age: 44
End: 2024-03-12
Payer: COMMERCIAL

## 2024-03-12 VITALS
OXYGEN SATURATION: 97 % | SYSTOLIC BLOOD PRESSURE: 110 MMHG | RESPIRATION RATE: 18 BRPM | HEART RATE: 78 BPM | BODY MASS INDEX: 37.9 KG/M2 | WEIGHT: 220.8 LBS | DIASTOLIC BLOOD PRESSURE: 76 MMHG

## 2024-03-12 DIAGNOSIS — F41.1 GAD (GENERALIZED ANXIETY DISORDER): ICD-10-CM

## 2024-03-12 DIAGNOSIS — F33.1 MODERATE EPISODE OF RECURRENT MAJOR DEPRESSIVE DISORDER (HCC): Primary | ICD-10-CM

## 2024-03-12 DIAGNOSIS — G89.29 CHRONIC NONINTRACTABLE HEADACHE, UNSPECIFIED HEADACHE TYPE: ICD-10-CM

## 2024-03-12 DIAGNOSIS — E66.09 CLASS 2 OBESITY DUE TO EXCESS CALORIES WITHOUT SERIOUS COMORBIDITY WITH BODY MASS INDEX (BMI) OF 37.0 TO 37.9 IN ADULT: ICD-10-CM

## 2024-03-12 DIAGNOSIS — Z02.83 ENCOUNTER FOR DRUG SCREENING: ICD-10-CM

## 2024-03-12 DIAGNOSIS — R51.9 CHRONIC NONINTRACTABLE HEADACHE, UNSPECIFIED HEADACHE TYPE: ICD-10-CM

## 2024-03-12 PROBLEM — R21 RASH: Status: RESOLVED | Noted: 2023-10-19 | Resolved: 2024-03-12

## 2024-03-12 PROBLEM — H69.83 OTHER SPECIFIED DISORDERS OF EUSTACHIAN TUBE, BILATERAL: Status: RESOLVED | Noted: 2021-12-16 | Resolved: 2024-03-12

## 2024-03-12 PROBLEM — F33.2 SEVERE EPISODE OF RECURRENT MAJOR DEPRESSIVE DISORDER, WITHOUT PSYCHOTIC FEATURES (HCC): Status: RESOLVED | Noted: 2022-12-21 | Resolved: 2024-03-12

## 2024-03-12 PROBLEM — R53.83 OTHER FATIGUE: Status: RESOLVED | Noted: 2023-10-19 | Resolved: 2024-03-12

## 2024-03-12 LAB
ALCOHOL URINE: ABNORMAL
AMPHETAMINE SCREEN, URINE: POSITIVE
BARBITURATE SCREEN, URINE: ABNORMAL
BENZODIAZEPINE SCREEN, URINE: POSITIVE
BUPRENORPHINE URINE: NEGATIVE
COCAINE METABOLITE SCREEN URINE: NEGATIVE
FENTANYL SCREEN, URINE: ABNORMAL
GABAPENTIN SCREEN, URINE: ABNORMAL
MDMA URINE: NEGATIVE
METHADONE SCREEN, URINE: NEGATIVE
METHAMPHETAMINE, URINE: NEGATIVE
OPIATE SCREEN URINE: NEGATIVE
OXYCODONE SCREEN URINE: NEGATIVE
PHENCYCLIDINE SCREEN URINE: NEGATIVE
PROPOXYPHENE SCREEN, URINE: ABNORMAL
SYNTHETIC CANNABINOIDS(K2) SCREEN, URINE: ABNORMAL
THC SCREEN, URINE: NEGATIVE
TRAMADOL SCREEN URINE: NEGATIVE
TRICYCLIC ANTIDEPRESSANTS, UR: NEGATIVE

## 2024-03-12 PROCEDURE — 80305 DRUG TEST PRSMV DIR OPT OBS: CPT | Performed by: PHYSICIAN ASSISTANT

## 2024-03-12 PROCEDURE — 99214 OFFICE O/P EST MOD 30 MIN: CPT | Performed by: PHYSICIAN ASSISTANT

## 2024-03-12 RX ORDER — SERTRALINE HYDROCHLORIDE 100 MG/1
100 TABLET, FILM COATED ORAL DAILY
Qty: 90 TABLET | Refills: 3 | Status: SHIPPED | OUTPATIENT
Start: 2024-03-12

## 2024-03-12 RX ORDER — IBUPROFEN 600 MG/1
600 TABLET ORAL EVERY 6 HOURS PRN
Qty: 360 TABLET | Refills: 3 | Status: SHIPPED | OUTPATIENT
Start: 2024-03-12

## 2024-03-12 RX ORDER — ALPRAZOLAM 1 MG/1
1 TABLET ORAL 2 TIMES DAILY PRN
Qty: 60 TABLET | Refills: 2 | Status: SHIPPED | OUTPATIENT
Start: 2024-03-12 | End: 2024-06-10

## 2024-03-12 ASSESSMENT — PATIENT HEALTH QUESTIONNAIRE - PHQ9
SUM OF ALL RESPONSES TO PHQ9 QUESTIONS 1 & 2: 0
2. FEELING DOWN, DEPRESSED OR HOPELESS: 0
8. MOVING OR SPEAKING SO SLOWLY THAT OTHER PEOPLE COULD HAVE NOTICED. OR THE OPPOSITE, BEING SO FIGETY OR RESTLESS THAT YOU HAVE BEEN MOVING AROUND A LOT MORE THAN USUAL: 0
4. FEELING TIRED OR HAVING LITTLE ENERGY: 0
10. IF YOU CHECKED OFF ANY PROBLEMS, HOW DIFFICULT HAVE THESE PROBLEMS MADE IT FOR YOU TO DO YOUR WORK, TAKE CARE OF THINGS AT HOME, OR GET ALONG WITH OTHER PEOPLE: 0
SUM OF ALL RESPONSES TO PHQ QUESTIONS 1-9: 4
SUM OF ALL RESPONSES TO PHQ QUESTIONS 1-9: 4
6. FEELING BAD ABOUT YOURSELF - OR THAT YOU ARE A FAILURE OR HAVE LET YOURSELF OR YOUR FAMILY DOWN: 0
SUM OF ALL RESPONSES TO PHQ QUESTIONS 1-9: 4
SUM OF ALL RESPONSES TO PHQ QUESTIONS 1-9: 4
1. LITTLE INTEREST OR PLEASURE IN DOING THINGS: 0
5. POOR APPETITE OR OVEREATING: 3
7. TROUBLE CONCENTRATING ON THINGS, SUCH AS READING THE NEWSPAPER OR WATCHING TELEVISION: 0
3. TROUBLE FALLING OR STAYING ASLEEP: 1
9. THOUGHTS THAT YOU WOULD BE BETTER OFF DEAD, OR OF HURTING YOURSELF: 0

## 2024-03-12 ASSESSMENT — ENCOUNTER SYMPTOMS
GASTROINTESTINAL NEGATIVE: 1
RESPIRATORY NEGATIVE: 1

## 2024-03-12 NOTE — PATIENT INSTRUCTIONS
We are committed to providing you the best care possible.    If you receive a survey after visiting one of our offices, please take time to share your experience concerning your physician office visit.  These surveys are confidential and no health information about you is shared.    We are eager to improve for you and continue to give you satisfactory care, we are counting on your feedback to help make that happen.            Welcome to [facility] and [facility]:    Did you know we now have a faster way for you to move through your appointment? For your convenience, we now have digital registration available. When you schedule your next appointment, you will receive a link via your email as well as a text message that will allow you to complete any paperwork digitally before your appointment.    [address] Exp Problem Focused - Mod. Complex

## 2024-03-12 NOTE — PROGRESS NOTES
Live Torres  1980  43 y.o.  female    SUBJECTIVE:    Chief Complaint   Patient presents with    Follow-up     No question or concerns     Flu Vaccine     Declined flu vaccine       HPI  Anxiety-chronic, using xanax prn. Aware of risks/side effects. Does tend to take it once a day during week, sometimes not at all during weekends unless she picks up extra hours. She is also on zoloft for management for her depression/anxiety.     Obesity-following at weight loss clinic currently. She is on adipex and has lost 11# so far her first month. Weighs in later this week-no adverse SE at this time.      Headaches-chronic, ibuprofen helpful. Follows with neurology at this time. Also uses fioricet prn      3/12/2024     8:17 AM 11/13/2023     9:51 AM 11/1/2023     7:52 AM 10/19/2023     1:28 PM 8/1/2023     3:09 PM 4/4/2023     7:58 AM 12/20/2022     2:37 PM   PHQ Scores   PHQ2 Score 0 0 0 3 0 0 4   PHQ9 Score 4 0 9 10 4 0 19     Interpretation of Total Score Depression Severity: 1-4 = Minimal depression, 5-9 = Mild depression, 10-14 = Moderate depression, 15-19 = Moderately severe depression, 20-27 = Severe depression     Current Outpatient Medications on File Prior to Visit   Medication Sig Dispense Refill    phentermine (ADIPEX-P) 37.5 MG tablet Take 1 tablet by mouth every morning (before breakfast) for 30 days. BMI 44. Max Daily Amount: 37.5 mg 30 tablet 0    butalbital-acetaminophen-caffeine (FIORICET, ESGIC) -40 MG per tablet TAKE 1 TABLET BY MOUTH EVERY 6 HOURS AS NEEDED FOR MIGRAINE. MAX OF 4 PER DAY 15 tablet 3    tiZANidine (ZANAFLEX) 4 MG tablet Take 1.5 tablets by mouth nightly 45 tablet 11    Atogepant (QULIPTA) 60 MG TABS Take 60 mg by mouth daily 30 tablet 11    Spacer/Aero-Holding Chambers WILLIAM 1 Device by Does not apply route daily as needed (with inhaler) 1 each 0    albuterol sulfate HFA (VENTOLIN HFA) 108 (90 Base) MCG/ACT inhaler Inhale 2 puffs into the lungs 4 times daily as needed for

## 2024-03-15 ENCOUNTER — OFFICE VISIT (OUTPATIENT)
Dept: BARIATRICS/WEIGHT MGMT | Age: 44
End: 2024-03-15
Payer: COMMERCIAL

## 2024-03-15 VITALS
HEIGHT: 64 IN | HEART RATE: 81 BPM | SYSTOLIC BLOOD PRESSURE: 124 MMHG | DIASTOLIC BLOOD PRESSURE: 74 MMHG | BODY MASS INDEX: 38.19 KG/M2 | OXYGEN SATURATION: 98 % | WEIGHT: 223.7 LBS

## 2024-03-15 DIAGNOSIS — Z79.899 MEDICATION MANAGEMENT: ICD-10-CM

## 2024-03-15 DIAGNOSIS — E66.9 OBESITY (BMI 30-39.9): Primary | ICD-10-CM

## 2024-03-15 PROCEDURE — 99214 OFFICE O/P EST MOD 30 MIN: CPT | Performed by: NURSE PRACTITIONER

## 2024-03-15 RX ORDER — SENNOSIDES A AND B 8.6 MG/1
1 TABLET, FILM COATED ORAL 2 TIMES DAILY
Qty: 60 TABLET | Refills: 11 | Status: SHIPPED | OUTPATIENT
Start: 2024-03-15 | End: 2025-03-15

## 2024-03-15 RX ORDER — PHENTERMINE HYDROCHLORIDE 37.5 MG/1
37.5 TABLET ORAL
Qty: 30 TABLET | Refills: 0 | Status: SHIPPED | OUTPATIENT
Start: 2024-03-15 | End: 2024-04-14

## 2024-03-15 ASSESSMENT — ENCOUNTER SYMPTOMS: CONSTIPATION: 1

## 2024-03-15 NOTE — PROGRESS NOTES
days. BMI 44. Max Daily Amount: 37.5 mg     Dispense:  30 tablet     Refill:  0    senna (SENOKOT) 8.6 MG tablet     Sig: Take 1 tablet by mouth 2 times daily     Dispense:  60 tablet     Refill:  11        Follow Up:  Return in about 1 month (around 4/15/2024).      Lashon Fajardo, APRN - CNP

## 2024-04-15 ENCOUNTER — TELEPHONE (OUTPATIENT)
Dept: SURGERY | Age: 44
End: 2024-04-15

## 2024-04-15 NOTE — TELEPHONE ENCOUNTER
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 Office offered another appt - pt stated that she could not make that either. - stefany advised - pt will have to wean off medication  1/2 pill every other day  until completed  pt can restart in 6 mo's

## 2024-04-18 DIAGNOSIS — R51.9 CHRONIC NONINTRACTABLE HEADACHE, UNSPECIFIED HEADACHE TYPE: ICD-10-CM

## 2024-04-18 DIAGNOSIS — G89.29 CHRONIC NONINTRACTABLE HEADACHE, UNSPECIFIED HEADACHE TYPE: ICD-10-CM

## 2024-04-18 RX ORDER — BENZOCAINE/MENTHOL 6 MG-10 MG
LOZENGE MUCOUS MEMBRANE
Qty: 30 G | Refills: 1 | Status: SHIPPED | OUTPATIENT
Start: 2024-04-18 | End: 2024-04-25

## 2024-04-18 NOTE — TELEPHONE ENCOUNTER
Last ov 11/29/23, next ov due in November 2024. Rx pending.     Requested Prescriptions     Pending Prescriptions Disp Refills    butalbital-acetaminophen-caffeine (FIORICET, ESGIC) -40 MG per tablet [Pharmacy Med Name: [id number] -40] 15 tablet 3     Sig: TAKE 1 TABLET BY MOUTH EVERY 6 HOURS AS NEEDED FOR MIGRAINE. MAX OF 4 PER DAY

## 2024-04-19 RX ORDER — BUTALBITAL, ACETAMINOPHEN AND CAFFEINE 50; 325; 40 MG/1; MG/1; MG/1
TABLET ORAL
Qty: 15 TABLET | Refills: 3 | Status: SHIPPED | OUTPATIENT
Start: 2024-04-19

## 2024-05-14 DIAGNOSIS — K92.1 BLOODY STOOLS: Primary | ICD-10-CM

## 2024-05-14 DIAGNOSIS — K64.9 HEMORRHOIDS, UNSPECIFIED HEMORRHOID TYPE: ICD-10-CM

## 2024-05-20 ENCOUNTER — TELEPHONE (OUTPATIENT)
Dept: GASTROENTEROLOGY | Age: 44
End: 2024-05-20

## 2024-05-20 NOTE — TELEPHONE ENCOUNTER
Called pt. In regards to a referral for blood in stool and rectal pain due to hemorrhoids. Made appt for pt to see dr. Anthony on 6/19/24 @8:15am

## 2024-05-28 DIAGNOSIS — G89.29 CHRONIC NONINTRACTABLE HEADACHE, UNSPECIFIED HEADACHE TYPE: ICD-10-CM

## 2024-05-28 DIAGNOSIS — R51.9 CHRONIC NONINTRACTABLE HEADACHE, UNSPECIFIED HEADACHE TYPE: ICD-10-CM

## 2024-05-28 RX ORDER — BUTALBITAL, ACETAMINOPHEN AND CAFFEINE 50; 325; 40 MG/1; MG/1; MG/1
TABLET ORAL
Qty: 15 TABLET | Refills: 3 | OUTPATIENT
Start: 2024-05-28

## 2024-05-29 DIAGNOSIS — R51.9 CHRONIC NONINTRACTABLE HEADACHE, UNSPECIFIED HEADACHE TYPE: ICD-10-CM

## 2024-05-29 DIAGNOSIS — Z12.4 CERVICAL CANCER SCREENING: Primary | ICD-10-CM

## 2024-05-29 DIAGNOSIS — G89.29 CHRONIC NONINTRACTABLE HEADACHE, UNSPECIFIED HEADACHE TYPE: ICD-10-CM

## 2024-05-29 RX ORDER — BUTALBITAL, ACETAMINOPHEN AND CAFFEINE 50; 325; 40 MG/1; MG/1; MG/1
TABLET ORAL
Qty: 15 TABLET | Refills: 3 | OUTPATIENT
Start: 2024-05-29

## 2024-06-02 ENCOUNTER — HOSPITAL ENCOUNTER (EMERGENCY)
Age: 44
Discharge: HOME | End: 2024-06-02
Attending: EMERGENCY MEDICINE
Payer: COMMERCIAL

## 2024-06-02 VITALS
HEIGHT: 64 IN | OXYGEN SATURATION: 99 % | TEMPERATURE: 97.9 F | BODY MASS INDEX: 35 KG/M2 | HEART RATE: 79 BPM | SYSTOLIC BLOOD PRESSURE: 112 MMHG | RESPIRATION RATE: 16 BRPM | DIASTOLIC BLOOD PRESSURE: 79 MMHG | WEIGHT: 205 LBS

## 2024-06-02 DIAGNOSIS — K62.5 RECTAL BLEED: ICD-10-CM

## 2024-06-02 DIAGNOSIS — K60.2 ANAL FISSURE: Primary | ICD-10-CM

## 2024-06-02 DIAGNOSIS — K62.89 RECTAL PAIN: ICD-10-CM

## 2024-06-02 PROCEDURE — 99283 EMERGENCY DEPT VISIT LOW MDM: CPT

## 2024-06-02 PROCEDURE — 6370000000 HC RX 637 (ALT 250 FOR IP): Performed by: EMERGENCY MEDICINE

## 2024-06-02 RX ORDER — NITROGLYCERIN 4 MG/G
1 OINTMENT RECTAL EVERY 12 HOURS
Qty: 30 G | Refills: 0 | Status: SHIPPED | OUTPATIENT
Start: 2024-06-02 | End: 2024-06-30

## 2024-06-02 RX ADMIN — NITROGLYCERIN 0.5 INCH: 20 OINTMENT TOPICAL at 01:54

## 2024-06-02 ASSESSMENT — PAIN DESCRIPTION - ORIENTATION: ORIENTATION: LEFT;OUTER;INNER

## 2024-06-02 ASSESSMENT — PAIN - FUNCTIONAL ASSESSMENT
PAIN_FUNCTIONAL_ASSESSMENT: PREVENTS OR INTERFERES SOME ACTIVE ACTIVITIES AND ADLS
PAIN_FUNCTIONAL_ASSESSMENT: 0-10

## 2024-06-02 ASSESSMENT — PAIN SCALES - GENERAL
PAINLEVEL_OUTOF10: 4
PAINLEVEL_OUTOF10: 6

## 2024-06-02 ASSESSMENT — PAIN DESCRIPTION - DESCRIPTORS: DESCRIPTORS: PATIENT UNABLE TO DESCRIBE

## 2024-06-02 ASSESSMENT — PAIN DESCRIPTION - LOCATION
LOCATION: BUTTOCKS
LOCATION: RECTUM

## 2024-06-02 NOTE — ED PROVIDER NOTES
CHIEF COMPLAINT    Chief Complaint   Patient presents with    Rectal Bleeding    Rectal Pain     HPI  Live Torres is a 44 y.o. female who presents to the ED with complaints of rectal pain and rectal bleeding.  Patient states that she was prescribed Adipex approximately 3 months ago to help with weight loss.  She noticed shortly after starting this she was dealing with some episode of constipation and was prescribed senna but despite taking this she began to notice some rectal pain and episodes of rectal bleeding.  She stopped taking antibiotics and has been in contact with her primary care provider regarding the rectal pain and rectal bleeding and has been prescribed hydrocortisone cream as well as Tucks pads for the pain and discomfort.  She had referral sent to gastroenterology and she has an appointment with gastroenterology on 06/19.  Patient's pain is described as a stabbing and throbbing pain that is mild at baseline but becomes severe with palpation or sitting directly onto the area with pressure.      REVIEW OF SYSTEMS  Constitutional: No fever, chills   Eye: No visual changes  HENT: No earache or sore throat.  Resp: No SOB or productive cough.  Cardio: No chest pain or palpitations.  GI: Complains of rectal pain and rectal bleeding. no abdominal pain, nausea, vomiting, constipation or diarrhea.   : No dysuria, urgency or frequency.  Endocrine: No heat intolerance, no cold intolerance, no polydipsia   Lymphatics: No adenopathy  Musculoskeletal: No new muscle aches or joint pain.  Neuro: No headaches.  Psych: No homicidal or suicidal thoughts  Skin: No rash, No itching.  ?  ?  PAST MEDICAL HISTORY  Past Medical History:   Diagnosis Date    Acute bronchitis due to infection 12/21/2022    Acute suppurative otitis media without spontaneous rupture of ear drum, unspecified ear 11/19/2021    Anxiety disorder, unspecified 10/19/2020    Headache     migraines    Impacted cerumen, right ear 11/5/2021    Lumbar

## 2024-06-12 DIAGNOSIS — G89.29 CHRONIC NONINTRACTABLE HEADACHE, UNSPECIFIED HEADACHE TYPE: ICD-10-CM

## 2024-06-12 DIAGNOSIS — R51.9 CHRONIC NONINTRACTABLE HEADACHE, UNSPECIFIED HEADACHE TYPE: ICD-10-CM

## 2024-06-12 NOTE — TELEPHONE ENCOUNTER
I spoke with divine at the pharmacy regarding this pt and she stated pt picked up Fioricet 5/19,5/30,6/5,613. Divine stated this would be all four refills that was on script. I stated the 15 pills should have been a month supply. Divine stated that it was no where mentioned on the script that the 15 pills should last 30 days. She said patient can have 4 a day at max and that only last so many days.

## 2024-06-13 DIAGNOSIS — F41.1 GAD (GENERALIZED ANXIETY DISORDER): ICD-10-CM

## 2024-06-13 RX ORDER — BUTALBITAL, ACETAMINOPHEN AND CAFFEINE 50; 325; 40 MG/1; MG/1; MG/1
TABLET ORAL
Qty: 15 TABLET | Refills: 3 | Status: SHIPPED | OUTPATIENT
Start: 2024-06-13

## 2024-06-13 RX ORDER — ALPRAZOLAM 1 MG/1
1 TABLET ORAL 2 TIMES DAILY PRN
Qty: 60 TABLET | Refills: 2 | OUTPATIENT
Start: 2024-06-13 | End: 2024-09-11

## 2024-06-13 NOTE — TELEPHONE ENCOUNTER
Spoke with pt. Advised of message from PCP. Pt. States she has appt. 7/11 and will have enough medication to make it until then. No further action.

## 2024-07-10 SDOH — ECONOMIC STABILITY: FOOD INSECURITY: WITHIN THE PAST 12 MONTHS, THE FOOD YOU BOUGHT JUST DIDN'T LAST AND YOU DIDN'T HAVE MONEY TO GET MORE.: NEVER TRUE

## 2024-07-10 SDOH — ECONOMIC STABILITY: INCOME INSECURITY: HOW HARD IS IT FOR YOU TO PAY FOR THE VERY BASICS LIKE FOOD, HOUSING, MEDICAL CARE, AND HEATING?: NOT HARD AT ALL

## 2024-07-10 SDOH — ECONOMIC STABILITY: TRANSPORTATION INSECURITY
IN THE PAST 12 MONTHS, HAS LACK OF TRANSPORTATION KEPT YOU FROM MEETINGS, WORK, OR FROM GETTING THINGS NEEDED FOR DAILY LIVING?: NO

## 2024-07-10 SDOH — ECONOMIC STABILITY: FOOD INSECURITY: WITHIN THE PAST 12 MONTHS, YOU WORRIED THAT YOUR FOOD WOULD RUN OUT BEFORE YOU GOT MONEY TO BUY MORE.: NEVER TRUE

## 2024-07-11 ENCOUNTER — OFFICE VISIT (OUTPATIENT)
Age: 44
End: 2024-07-11
Payer: COMMERCIAL

## 2024-07-11 VITALS
OXYGEN SATURATION: 97 % | SYSTOLIC BLOOD PRESSURE: 128 MMHG | HEART RATE: 80 BPM | HEIGHT: 64 IN | BODY MASS INDEX: 38.28 KG/M2 | DIASTOLIC BLOOD PRESSURE: 74 MMHG | RESPIRATION RATE: 18 BRPM | WEIGHT: 224.2 LBS

## 2024-07-11 DIAGNOSIS — Z00.00 WELL ADULT EXAM: ICD-10-CM

## 2024-07-11 DIAGNOSIS — R51.9 CHRONIC NONINTRACTABLE HEADACHE, UNSPECIFIED HEADACHE TYPE: ICD-10-CM

## 2024-07-11 DIAGNOSIS — F33.1 MODERATE EPISODE OF RECURRENT MAJOR DEPRESSIVE DISORDER (HCC): ICD-10-CM

## 2024-07-11 DIAGNOSIS — G89.29 CHRONIC NONINTRACTABLE HEADACHE, UNSPECIFIED HEADACHE TYPE: ICD-10-CM

## 2024-07-11 DIAGNOSIS — F41.1 GAD (GENERALIZED ANXIETY DISORDER): ICD-10-CM

## 2024-07-11 DIAGNOSIS — Z12.31 ENCOUNTER FOR SCREENING MAMMOGRAM FOR BREAST CANCER: Primary | ICD-10-CM

## 2024-07-11 PROCEDURE — 99214 OFFICE O/P EST MOD 30 MIN: CPT

## 2024-07-11 RX ORDER — SERTRALINE HYDROCHLORIDE 100 MG/1
100 TABLET, FILM COATED ORAL DAILY
Qty: 90 TABLET | Refills: 3 | Status: SHIPPED | OUTPATIENT
Start: 2024-07-11

## 2024-07-11 RX ORDER — ALPRAZOLAM 1 MG/1
1 TABLET ORAL 2 TIMES DAILY
Qty: 60 TABLET | Refills: 0 | Status: SHIPPED | OUTPATIENT
Start: 2024-09-09 | End: 2024-10-09

## 2024-07-11 RX ORDER — ALPRAZOLAM 1 MG/1
1 TABLET ORAL 2 TIMES DAILY
Qty: 60 TABLET | Refills: 0 | Status: SHIPPED | OUTPATIENT
Start: 2024-07-11 | End: 2024-08-10

## 2024-07-11 RX ORDER — ALPRAZOLAM 1 MG/1
1 TABLET ORAL 2 TIMES DAILY
COMMUNITY
End: 2024-07-11 | Stop reason: SDUPTHER

## 2024-07-11 RX ORDER — IBUPROFEN 600 MG/1
600 TABLET ORAL EVERY 6 HOURS PRN
Qty: 360 TABLET | Refills: 3 | Status: SHIPPED | OUTPATIENT
Start: 2024-07-11

## 2024-07-11 RX ORDER — ALPRAZOLAM 1 MG/1
1 TABLET ORAL 2 TIMES DAILY
Qty: 60 TABLET | Refills: 0 | Status: SHIPPED | OUTPATIENT
Start: 2024-08-10 | End: 2024-09-09

## 2024-07-11 SDOH — ECONOMIC STABILITY: FOOD INSECURITY: WITHIN THE PAST 12 MONTHS, THE FOOD YOU BOUGHT JUST DIDN'T LAST AND YOU DIDN'T HAVE MONEY TO GET MORE.: NEVER TRUE

## 2024-07-11 SDOH — ECONOMIC STABILITY: FOOD INSECURITY: WITHIN THE PAST 12 MONTHS, YOU WORRIED THAT YOUR FOOD WOULD RUN OUT BEFORE YOU GOT MONEY TO BUY MORE.: NEVER TRUE

## 2024-07-11 SDOH — ECONOMIC STABILITY: INCOME INSECURITY: HOW HARD IS IT FOR YOU TO PAY FOR THE VERY BASICS LIKE FOOD, HOUSING, MEDICAL CARE, AND HEATING?: NOT HARD AT ALL

## 2024-07-11 ASSESSMENT — PATIENT HEALTH QUESTIONNAIRE - PHQ9
SUM OF ALL RESPONSES TO PHQ9 QUESTIONS 1 & 2: 0
SUM OF ALL RESPONSES TO PHQ QUESTIONS 1-9: 6
7. TROUBLE CONCENTRATING ON THINGS, SUCH AS READING THE NEWSPAPER OR WATCHING TELEVISION: NOT AT ALL
3. TROUBLE FALLING OR STAYING ASLEEP: NEARLY EVERY DAY
1. LITTLE INTEREST OR PLEASURE IN DOING THINGS: NOT AT ALL
6. FEELING BAD ABOUT YOURSELF - OR THAT YOU ARE A FAILURE OR HAVE LET YOURSELF OR YOUR FAMILY DOWN: NOT AT ALL
SUM OF ALL RESPONSES TO PHQ QUESTIONS 1-9: 6
2. FEELING DOWN, DEPRESSED OR HOPELESS: NOT AT ALL
SUM OF ALL RESPONSES TO PHQ QUESTIONS 1-9: 6
10. IF YOU CHECKED OFF ANY PROBLEMS, HOW DIFFICULT HAVE THESE PROBLEMS MADE IT FOR YOU TO DO YOUR WORK, TAKE CARE OF THINGS AT HOME, OR GET ALONG WITH OTHER PEOPLE: SOMEWHAT DIFFICULT
5. POOR APPETITE OR OVEREATING: NOT AT ALL
SUM OF ALL RESPONSES TO PHQ QUESTIONS 1-9: 6
8. MOVING OR SPEAKING SO SLOWLY THAT OTHER PEOPLE COULD HAVE NOTICED. OR THE OPPOSITE, BEING SO FIGETY OR RESTLESS THAT YOU HAVE BEEN MOVING AROUND A LOT MORE THAN USUAL: NOT AT ALL
9. THOUGHTS THAT YOU WOULD BE BETTER OFF DEAD, OR OF HURTING YOURSELF: NOT AT ALL
4. FEELING TIRED OR HAVING LITTLE ENERGY: NEARLY EVERY DAY

## 2024-07-11 ASSESSMENT — ENCOUNTER SYMPTOMS
RESPIRATORY NEGATIVE: 1
GASTROINTESTINAL NEGATIVE: 1

## 2024-07-11 NOTE — PROGRESS NOTES
butalbital-acetaminophen-caffeine (FIORICET, ESGIC) -40 MG per tablet TAKE 1 TABLET BY MOUTH EVERY 6 HOURS AS NEEDED FOR MIGRAINE. MAX OF 4 PER DAY, no more than 15 tablets per month 15 tablet 3    senna (SENOKOT) 8.6 MG tablet Take 1 tablet by mouth 2 times daily 60 tablet 11    tiZANidine (ZANAFLEX) 4 MG tablet Take 1.5 tablets by mouth nightly 45 tablet 11    Atogepant (QULIPTA) 60 MG TABS Take 60 mg by mouth daily 30 tablet 11    albuterol sulfate HFA (VENTOLIN HFA) 108 (90 Base) MCG/ACT inhaler Inhale 2 puffs into the lungs 4 times daily as needed for Wheezing (Patient taking differently: Inhale 2 puffs into the lungs 4 times daily as needed for Wheezing prn) 18 g 0     No current facility-administered medications for this visit.       /74 (Site: Left Upper Arm, Position: Sitting, Cuff Size: Large Adult)   Pulse 80   Resp 18   Ht 1.626 m (5' 4\")   Wt 101.7 kg (224 lb 3.2 oz)   SpO2 97%   BMI 38.48 kg/m²     Review of Systems   Constitutional: Negative.    Respiratory: Negative.     Cardiovascular: Negative.    Gastrointestinal: Negative.    Endocrine: Negative.    Neurological: Negative.    Psychiatric/Behavioral: Negative.  Negative for suicidal ideas.           Objective   Physical Exam  Vitals and nursing note reviewed.   Constitutional:       General: She is not in acute distress.     Appearance: Normal appearance. She is not ill-appearing or toxic-appearing.   Cardiovascular:      Rate and Rhythm: Normal rate and regular rhythm.      Pulses: Normal pulses.      Heart sounds: Normal heart sounds. No murmur heard.  Pulmonary:      Effort: Pulmonary effort is normal. No respiratory distress.      Breath sounds: Normal breath sounds. No wheezing or rhonchi.   Neurological:      General: No focal deficit present.      Mental Status: She is alert and oriented to person, place, and time.   Psychiatric:         Mood and Affect: Mood normal.         Behavior: Behavior normal.            Assessment  16.7

## 2024-07-11 NOTE — PATIENT INSTRUCTIONS
We are committed to providing you the best care possible.    If you receive a survey after visiting one of our offices, please take time to share your experience concerning your physician office visit.  These surveys are confidential and no health information about you is shared.    We are eager to improve for you and continue to give you satisfactory care, we are counting on your feedback to help make that happen.            Welcome to [facility]:    Did you know we now have a faster way for you to move through your appointment? For your convenience, we now have digital registration available. When you schedule your next appointment, you will receive a link via your email as well as a text message that will allow you to complete any paperwork digitally before your appointment.

## 2024-07-12 LAB
ALBUMIN SERPL-MCNC: 3.9 G/DL (ref 3.4–5)
ALBUMIN/GLOB SERPL: 1.7 {RATIO} (ref 1.1–2.2)
ALP SERPL-CCNC: 121 U/L (ref 40–129)
ALT SERPL-CCNC: 21 U/L (ref 10–40)
ANION GAP SERPL CALCULATED.3IONS-SCNC: 12 MMOL/L (ref 3–16)
AST SERPL-CCNC: 14 U/L (ref 15–37)
BILIRUB SERPL-MCNC: <0.2 MG/DL (ref 0–1)
BUN SERPL-MCNC: 16 MG/DL (ref 7–20)
CALCIUM SERPL-MCNC: 8.8 MG/DL (ref 8.3–10.6)
CHLORIDE SERPL-SCNC: 104 MMOL/L (ref 99–110)
CHOLEST SERPL-MCNC: 199 MG/DL (ref 0–199)
CO2 SERPL-SCNC: 22 MMOL/L (ref 21–32)
CREAT SERPL-MCNC: 0.6 MG/DL (ref 0.6–1.1)
EST. AVERAGE GLUCOSE BLD GHB EST-MCNC: 114 MG/DL
GFR SERPLBLD CREATININE-BSD FMLA CKD-EPI: >90 ML/MIN/{1.73_M2}
GLUCOSE SERPL-MCNC: 101 MG/DL (ref 70–99)
HBA1C MFR BLD: 5.6 %
HDLC SERPL-MCNC: 41 MG/DL (ref 40–60)
LDL CHOLESTEROL: 124 MG/DL
POTASSIUM SERPL-SCNC: 4.1 MMOL/L (ref 3.5–5.1)
PROT SERPL-MCNC: 6.2 G/DL (ref 6.4–8.2)
SODIUM SERPL-SCNC: 138 MMOL/L (ref 136–145)
TRIGL SERPL-MCNC: 170 MG/DL (ref 0–150)
TSH SERPL DL<=0.005 MIU/L-ACNC: 1.89 UIU/ML (ref 0.27–4.2)
VLDLC SERPL CALC-MCNC: 34 MG/DL

## 2024-07-18 ENCOUNTER — INITIAL CONSULT (OUTPATIENT)
Age: 44
End: 2024-07-18
Payer: COMMERCIAL

## 2024-07-18 ENCOUNTER — HOSPITAL ENCOUNTER (OUTPATIENT)
Age: 44
Setting detail: SPECIMEN
Discharge: HOME | End: 2024-07-18
Payer: COMMERCIAL

## 2024-07-18 VITALS
HEIGHT: 64 IN | DIASTOLIC BLOOD PRESSURE: 80 MMHG | WEIGHT: 221 LBS | BODY MASS INDEX: 37.73 KG/M2 | SYSTOLIC BLOOD PRESSURE: 118 MMHG

## 2024-07-18 DIAGNOSIS — Z11.51 SPECIAL SCREENING EXAMINATION FOR HUMAN PAPILLOMAVIRUS (HPV): ICD-10-CM

## 2024-07-18 DIAGNOSIS — Z12.4 CERVICAL CANCER SCREENING: ICD-10-CM

## 2024-07-18 DIAGNOSIS — Z01.419 WELL WOMAN EXAM WITH ROUTINE GYNECOLOGICAL EXAM: Primary | ICD-10-CM

## 2024-07-18 PROCEDURE — 88142 CYTOPATH C/V THIN LAYER: CPT

## 2024-07-18 PROCEDURE — 88305 TISSUE EXAM BY PATHOLOGIST: CPT

## 2024-07-18 PROCEDURE — 99386 PREV VISIT NEW AGE 40-64: CPT | Performed by: STUDENT IN AN ORGANIZED HEALTH CARE EDUCATION/TRAINING PROGRAM

## 2024-07-18 PROCEDURE — 87624 HPV HI-RISK TYP POOLED RSLT: CPT

## 2024-07-18 NOTE — PROGRESS NOTES
swelling or pain  Neurological: negative for headaches or dizziness  Behavioral/Psych: negative for depression/anxiety    OBJECTIVE:   /80 (Site: Left Upper Arm, Position: Sitting, Cuff Size: Large Adult)   Ht 1.626 m (5' 4\")   Wt 100.2 kg (221 lb)   LMP 06/20/2024 Comment: sterilization in 2024  BMI 37.93 kg/m²   Body mass index is 37.93 kg/m².    Constitutional: healthy appearing, well nourished, well developed  Psychiatric: oriented to time/place/person, active and alert, normal mood/affect  Skin: no rashes, no lesions  Abdomen: soft, no distention, no tenderness, no masses, no CVA tenderness  Hernia: no hernia palpated  Breast: breasts appear symmetric, no skin changes, no abnormal nipple discharge, normal nipple appearance, no tenderness, no masses  Female Genitalia:   External genitalia: bilateral labia with no masses, no atrophy, no lesions. Normal hair distribution   Bladder: no fullness, no masses, no tenderness   Urethra: no discharge, no masses, no tenderness   Urethral meatus: normal location, normal meatus, no urethral discharge, no prolapse   Vagina: normal vaginal tissue, no tenderness, no erythema, no abnormal discharge, no vesicles/ulcers, no cystocele, no rectocele, no atrophy   Cervix: grossly normal, no discharge, no cervical motion tenderness   Uterus: normal size, normal shape, midline, mobile, no tenderness, no prolapse   Adnexa: no tenderness, no mass  Lymph Nodes: no tender axillary LN, no tender inguinal LN  Musculoskeletal: no calf tenderness, no significant edema    Chaperone: Zahraa Bee MA present for exam      A/P:   Diagnosis Orders   1. Well woman exam with routine gynecological exam        2. Cervical cancer screening  PAP SMEAR      3. Special screening examination for human papillomavirus (HPV)  PAP SMEAR          -Cervical cancer screening - pap today, await results.  New pap guidelines discussed with patient.  Plan next pap pending results.  -Health

## 2024-07-21 LAB — HPV HIGH RISK: NOT DETECTED

## 2024-07-30 ENCOUNTER — TELEPHONE (OUTPATIENT)
Dept: NEUROLOGY | Age: 44
End: 2024-07-30

## 2024-07-30 NOTE — TELEPHONE ENCOUNTER
PA request qulipta submitted [employer] scripts    Your request has been approved  CaseId:[id number];Status:Approved;Review Type:Prior Auth;Coverage Start Date:07/30/2024;Coverage End Date:07/30/2025;

## 2024-08-13 NOTE — LETTER
used to contact pt.   Unable to reach Patient after 3 attempts.  Left 1 voice message at phone number given (# [phone] ) and 1 voice message at       ( #[phone]) .  Advised if condition becomes life threatening should seek immediate medical assistance by calling 911 or going to the nearest Emergency Dept for an evaluation.      Reason for Disposition   No answer.  First attempt to contact caller.  Follow-up call scheduled within 15 minutes.   Message left on unidentified voice mail. Phone number verified.   Second attempt to contact caller AND no contact made. Phone number verified.   Third attempt to contact caller AND no contact made. Phone number verified.    Protocols used: No Contact or Duplicate Contact Call-A-OH     [facility]  [address] [address]  Phone: [phone]  Fax: [phone]    ANDREW Diaz CNP        October 10, 2022     Patient: Yara Yepez   YOB: 1980   Date of Visit: 10/10/2022       To Whom It May Concern: Yara Yepez was seen in the office today 10/10/22. If you have any questions or concerns, please don't hesitate to call.     Sincerely,        ANDREW Diaz CNP

## 2024-08-14 DIAGNOSIS — G89.29 CHRONIC NONINTRACTABLE HEADACHE, UNSPECIFIED HEADACHE TYPE: ICD-10-CM

## 2024-08-14 DIAGNOSIS — R51.9 CHRONIC NONINTRACTABLE HEADACHE, UNSPECIFIED HEADACHE TYPE: ICD-10-CM

## 2024-08-14 RX ORDER — BUTALBITAL, ACETAMINOPHEN AND CAFFEINE 50; 325; 40 MG/1; MG/1; MG/1
TABLET ORAL
Qty: 15 TABLET | Refills: 3 | OUTPATIENT
Start: 2024-08-14

## 2024-08-15 ENCOUNTER — PATIENT MESSAGE (OUTPATIENT)
Dept: NEUROLOGY | Age: 44
End: 2024-08-15

## 2024-08-15 DIAGNOSIS — R51.9 CHRONIC NONINTRACTABLE HEADACHE, UNSPECIFIED HEADACHE TYPE: ICD-10-CM

## 2024-08-15 DIAGNOSIS — G89.29 CHRONIC NONINTRACTABLE HEADACHE, UNSPECIFIED HEADACHE TYPE: ICD-10-CM

## 2024-08-19 ENCOUNTER — PATIENT MESSAGE (OUTPATIENT)
Age: 44
End: 2024-08-19

## 2024-08-20 RX ORDER — BUTALBITAL, ACETAMINOPHEN AND CAFFEINE 50; 325; 40 MG/1; MG/1; MG/1
TABLET ORAL
Qty: 15 TABLET | Refills: 0 | Status: SHIPPED | OUTPATIENT
Start: 2024-08-20

## 2024-08-20 NOTE — TELEPHONE ENCOUNTER
Pt called in upset that she has not heard anything about her refill for Fioricet. I checked the script and the pt should have enough refills to get her to 10/14/24. I informed he pt of this and she said she is out. I contacted the pharmacy and they stated even tho the script clearly stated in two different spots that 15 pills is for 30 days it does not matter. They said if you want it to only be 15 pills per month, then one script a month with no refill has to be sent to the pharmacy. The refills were picked up 6/13, 6/24,7/5 and 7/12.  I called pt back and informed her Live will refill it with zero refills from now on her or the pharmacy will have to contact us for a refill when it is due.    Last Visit: 11/29/23    No follow up on file.     Rx is pending with no refills.         Requested Prescriptions     Pending Prescriptions Disp Refills    butalbital-acetaminophen-caffeine (FIORICET, ESGIC) -40 MG per tablet 15 tablet 0     Sig: TAKE 1 TABLET BY MOUTH EVERY 6 HOURS AS NEEDED FOR MIGRAINE. MAX OF 4 PER DAY, no more than 15 tablets per month

## 2024-08-21 DIAGNOSIS — G89.29 CHRONIC NONINTRACTABLE HEADACHE, UNSPECIFIED HEADACHE TYPE: ICD-10-CM

## 2024-08-21 DIAGNOSIS — R51.9 CHRONIC NONINTRACTABLE HEADACHE, UNSPECIFIED HEADACHE TYPE: ICD-10-CM

## 2024-08-21 RX ORDER — BUTALBITAL, ACETAMINOPHEN AND CAFFEINE 50; 325; 40 MG/1; MG/1; MG/1
TABLET ORAL
Qty: 15 TABLET | Refills: 0 | OUTPATIENT
Start: 2024-08-21

## 2024-08-21 NOTE — TELEPHONE ENCOUNTER
Called pharmacist manager and spoke with him and gave verbal order for 1 month no refill order.    Will address fact that they are requiring 30 day supply be on prescription otherwise they are running it through insurance at the 4 pills per day instructions and supplying patient with amounts exceeding 15 tabs per month which is in the order.

## 2024-08-21 NOTE — TELEPHONE ENCOUNTER
I called the pharmacy and spoke with the pharmacist, they did not receive script please cancel and resend.     Requested Prescriptions     Pending Prescriptions Disp Refills    butalbital-acetaminophen-caffeine (FIORICET, ESGIC) -40 MG per tablet 15 tablet 0     Sig: TAKE 1 TABLET BY MOUTH EVERY 6 HOURS AS NEEDED FOR MIGRAINE. MAX OF 4 PER DAY, no more than 15 tablets per month

## 2024-09-24 ENCOUNTER — PATIENT MESSAGE (OUTPATIENT)
Age: 44
End: 2024-09-24

## 2024-09-25 ENCOUNTER — TELEPHONE (OUTPATIENT)
Age: 44
End: 2024-09-25

## 2024-09-26 ENCOUNTER — OFFICE VISIT (OUTPATIENT)
Age: 44
End: 2024-09-26
Payer: COMMERCIAL

## 2024-09-26 VITALS
WEIGHT: 227.6 LBS | BODY MASS INDEX: 38.86 KG/M2 | RESPIRATION RATE: 18 BRPM | OXYGEN SATURATION: 98 % | HEART RATE: 84 BPM | SYSTOLIC BLOOD PRESSURE: 124 MMHG | DIASTOLIC BLOOD PRESSURE: 70 MMHG | HEIGHT: 64 IN

## 2024-09-26 DIAGNOSIS — J01.40 ACUTE NON-RECURRENT PANSINUSITIS: Primary | ICD-10-CM

## 2024-09-26 PROCEDURE — 99213 OFFICE O/P EST LOW 20 MIN: CPT

## 2024-09-26 ASSESSMENT — ENCOUNTER SYMPTOMS
DIARRHEA: 1
NAUSEA: 1
SINUS PRESSURE: 1
VOMITING: 1
SINUS PAIN: 1
COUGH: 1
SHORTNESS OF BREATH: 1
SORE THROAT: 1
RHINORRHEA: 1
ABDOMINAL PAIN: 0

## 2024-09-26 ASSESSMENT — PATIENT HEALTH QUESTIONNAIRE - PHQ9
SUM OF ALL RESPONSES TO PHQ QUESTIONS 1-9: 12
8. MOVING OR SPEAKING SO SLOWLY THAT OTHER PEOPLE COULD HAVE NOTICED. OR THE OPPOSITE, BEING SO FIGETY OR RESTLESS THAT YOU HAVE BEEN MOVING AROUND A LOT MORE THAN USUAL: SEVERAL DAYS
3. TROUBLE FALLING OR STAYING ASLEEP: NEARLY EVERY DAY
9. THOUGHTS THAT YOU WOULD BE BETTER OFF DEAD, OR OF HURTING YOURSELF: NOT AT ALL
10. IF YOU CHECKED OFF ANY PROBLEMS, HOW DIFFICULT HAVE THESE PROBLEMS MADE IT FOR YOU TO DO YOUR WORK, TAKE CARE OF THINGS AT HOME, OR GET ALONG WITH OTHER PEOPLE: NOT DIFFICULT AT ALL
SUM OF ALL RESPONSES TO PHQ9 QUESTIONS 1 & 2: 3
1. LITTLE INTEREST OR PLEASURE IN DOING THINGS: NEARLY EVERY DAY
5. POOR APPETITE OR OVEREATING: SEVERAL DAYS
7. TROUBLE CONCENTRATING ON THINGS, SUCH AS READING THE NEWSPAPER OR WATCHING TELEVISION: SEVERAL DAYS
SUM OF ALL RESPONSES TO PHQ QUESTIONS 1-9: 12
6. FEELING BAD ABOUT YOURSELF - OR THAT YOU ARE A FAILURE OR HAVE LET YOURSELF OR YOUR FAMILY DOWN: NOT AT ALL
SUM OF ALL RESPONSES TO PHQ QUESTIONS 1-9: 12
2. FEELING DOWN, DEPRESSED OR HOPELESS: NOT AT ALL
4. FEELING TIRED OR HAVING LITTLE ENERGY: NEARLY EVERY DAY
SUM OF ALL RESPONSES TO PHQ QUESTIONS 1-9: 12

## 2024-09-30 RX ORDER — ALBUTEROL SULFATE 90 UG/1
2 INHALANT RESPIRATORY (INHALATION) 4 TIMES DAILY PRN
Qty: 18 G | Refills: 0 | Status: SHIPPED | OUTPATIENT
Start: 2024-09-30

## 2024-10-02 DIAGNOSIS — G89.29 CHRONIC NONINTRACTABLE HEADACHE, UNSPECIFIED HEADACHE TYPE: ICD-10-CM

## 2024-10-02 DIAGNOSIS — R51.9 CHRONIC NONINTRACTABLE HEADACHE, UNSPECIFIED HEADACHE TYPE: ICD-10-CM

## 2024-10-02 RX ORDER — BUTALBITAL, ACETAMINOPHEN AND CAFFEINE 50; 325; 40 MG/1; MG/1; MG/1
TABLET ORAL
Qty: 15 TABLET | Refills: 0 | OUTPATIENT
Start: 2024-10-02

## 2024-10-02 RX ORDER — BUTALBITAL, ASPIRIN, AND CAFFEINE 325; 50; 40 MG/1; MG/1; MG/1
CAPSULE ORAL
Qty: 15 CAPSULE | Refills: 0 | Status: SHIPPED | OUTPATIENT
Start: 2024-10-02 | End: 2024-11-02

## 2024-10-02 RX ORDER — ATOGEPANT 60 MG/1
60 TABLET ORAL DAILY
Qty: 30 TABLET | Refills: 11 | OUTPATIENT
Start: 2024-10-02

## 2024-10-02 RX ORDER — ATOGEPANT 60 MG/1
60 TABLET ORAL DAILY
Qty: 30 TABLET | Refills: 11 | Status: SHIPPED | OUTPATIENT
Start: 2024-10-02

## 2024-10-14 ENCOUNTER — TELEPHONE (OUTPATIENT)
Age: 44
End: 2024-10-14

## 2024-10-14 NOTE — TELEPHONE ENCOUNTER
Patient called cause still sick .   Son seen provider today and tested positive for COVID .  Patient wants to know what she should do.  If she should be treated for COVID since has been around him .    Please advise

## 2024-10-21 DIAGNOSIS — G89.29 CHRONIC NONINTRACTABLE HEADACHE, UNSPECIFIED HEADACHE TYPE: ICD-10-CM

## 2024-10-21 DIAGNOSIS — R51.9 CHRONIC NONINTRACTABLE HEADACHE, UNSPECIFIED HEADACHE TYPE: ICD-10-CM

## 2024-10-22 ENCOUNTER — OFFICE VISIT (OUTPATIENT)
Dept: NEUROLOGY | Age: 44
End: 2024-10-22
Payer: COMMERCIAL

## 2024-10-22 VITALS
SYSTOLIC BLOOD PRESSURE: 118 MMHG | DIASTOLIC BLOOD PRESSURE: 76 MMHG | WEIGHT: 227.8 LBS | HEART RATE: 103 BPM | BODY MASS INDEX: 39.1 KG/M2 | OXYGEN SATURATION: 97 %

## 2024-10-22 DIAGNOSIS — R51.9 CHRONIC NONINTRACTABLE HEADACHE, UNSPECIFIED HEADACHE TYPE: ICD-10-CM

## 2024-10-22 DIAGNOSIS — G89.29 CHRONIC NONINTRACTABLE HEADACHE, UNSPECIFIED HEADACHE TYPE: ICD-10-CM

## 2024-10-22 PROCEDURE — 99213 OFFICE O/P EST LOW 20 MIN: CPT | Performed by: NURSE PRACTITIONER

## 2024-10-22 RX ORDER — ALPRAZOLAM 1 MG/1
TABLET ORAL
COMMUNITY
Start: 2024-10-02

## 2024-10-22 RX ORDER — BUTALBITAL, ASPIRIN, AND CAFFEINE 325; 50; 40 MG/1; MG/1; MG/1
CAPSULE ORAL
Qty: 15 CAPSULE | Refills: 0 | Status: SHIPPED | OUTPATIENT
Start: 2024-10-22 | End: 2024-11-22

## 2024-10-22 NOTE — PROGRESS NOTES
10/22/24    Live Brian  1980    Chief Complaint   Patient presents with    Follow-up     Following up for Chronic nonintractable headache, unspecified headache type       History of Present Illness  Live is a 44 y.o. female presenting today for follow-up of:Chronic migraine with superimposed bilateral occipital neuralgia.  She remains on Qulipta 60 mg, tizanidine 6 mg at bedtime for preventative therapy and Fioricet for migraine abortive therapy.  Since adding Qulipta, she remains satisfied with the control she has over her migraines.       Current preventative: Zoloft, Qulipta, tizanidine  Preventative tried: Effexor  Current abortive: Fiorcet, ibuprofen  Abortive tried: Maxalt, Imitrex, Nurtec  Current Outpatient Medications   Medication Sig Dispense Refill    ALPRAZolam (XANAX) 1 MG tablet PLEASE SEE ATTACHED FOR DETAILED DIRECTIONS      butalbital-aspirin-caffeine (FIORINAL) -40 MG capsule TAKE 1 CAPSULE BY MOUTH EVERY 6 HOURS AS NEEDED FOR MIGRAINE. MAX 4TABS/DAY. NO MORE THAN 15 TABLETS PER MONTH, Disp-15 capsule, R-0 15 capsule 0    tiZANidine (ZANAFLEX) 4 MG tablet Take 1.5 tablets by mouth at bedtime 135 tablet 3    QULIPTA 60 MG TABS TAKE 60 MG BY MOUTH DAILY 30 tablet 11    albuterol sulfate HFA (VENTOLIN HFA) 108 (90 Base) MCG/ACT inhaler Inhale 2 puffs into the lungs 4 times daily as needed for Wheezing 18 g 0    butalbital-acetaminophen-caffeine (FIORICET, ESGIC) -40 MG per tablet TAKE 1 TABLET BY MOUTH EVERY 6 HOURS AS NEEDED FOR MIGRAINE. MAX OF 4 PER DAY, no more than 15 tablets per month 15 tablet 0    sertraline (ZOLOFT) 100 MG tablet Take 1 tablet by mouth daily 90 tablet 3    ibuprofen (ADVIL;MOTRIN) 600 MG tablet Take 1 tablet by mouth every 6 hours as needed for Pain 360 tablet 3    senna (SENOKOT) 8.6 MG tablet Take 1 tablet by mouth 2 times daily 60 tablet 11     No current facility-administered medications for this visit.         /76   Pulse (!) 103   Wt

## 2024-11-11 DIAGNOSIS — F41.1 GENERALIZED ANXIETY DISORDER: ICD-10-CM

## 2024-11-11 RX ORDER — ALPRAZOLAM 1 MG/1
TABLET ORAL
Qty: 60 TABLET | Refills: 0 | OUTPATIENT
Start: 2024-11-11

## 2024-11-25 DIAGNOSIS — G89.29 CHRONIC NONINTRACTABLE HEADACHE, UNSPECIFIED HEADACHE TYPE: ICD-10-CM

## 2024-11-25 DIAGNOSIS — R51.9 CHRONIC NONINTRACTABLE HEADACHE, UNSPECIFIED HEADACHE TYPE: ICD-10-CM

## 2024-11-25 RX ORDER — BUTALBITAL, ASPIRIN, AND CAFFEINE 325; 50; 40 MG/1; MG/1; MG/1
CAPSULE ORAL
Qty: 15 CAPSULE | Refills: 0 | Status: CANCELLED | OUTPATIENT
Start: 2024-11-25 | End: 2024-12-26

## 2024-11-25 RX ORDER — BUTALBITAL, ACETAMINOPHEN AND CAFFEINE 50; 325; 40 MG/1; MG/1; MG/1
TABLET ORAL
Qty: 15 TABLET | Refills: 0 | Status: SHIPPED | OUTPATIENT
Start: 2024-11-25

## 2024-11-25 RX ORDER — BUTALBITAL, ASPIRIN, AND CAFFEINE 325; 50; 40 MG/1; MG/1; MG/1
CAPSULE ORAL
Qty: 15 CAPSULE | Refills: 0 | OUTPATIENT
Start: 2024-11-25

## 2024-11-25 NOTE — TELEPHONE ENCOUNTER
Patient requesting a refill on butalbital-aspirin-caffeine, last visit was 10/22/2024. Patient expected to follow up in 1 year around 10/2025.

## 2024-11-25 NOTE — TELEPHONE ENCOUNTER
Patient called upset that their is no refills on her fioricent, their was also confusion on the fiorinal still being on the med list (please d/c)  I tried explaining to patient scheduled medications are prescribed on month to month basis, she is not happy with this answer and wants to talk with provider.

## 2024-11-26 ENCOUNTER — OFFICE VISIT (OUTPATIENT)
Age: 44
End: 2024-11-26
Payer: COMMERCIAL

## 2024-11-26 VITALS
SYSTOLIC BLOOD PRESSURE: 120 MMHG | RESPIRATION RATE: 16 BRPM | WEIGHT: 232.2 LBS | HEIGHT: 64 IN | DIASTOLIC BLOOD PRESSURE: 70 MMHG | BODY MASS INDEX: 39.64 KG/M2 | OXYGEN SATURATION: 96 % | HEART RATE: 88 BPM

## 2024-11-26 DIAGNOSIS — F41.1 GAD (GENERALIZED ANXIETY DISORDER): ICD-10-CM

## 2024-11-26 PROCEDURE — 99213 OFFICE O/P EST LOW 20 MIN: CPT

## 2024-11-26 RX ORDER — SENNOSIDES A AND B 8.6 MG/1
1 TABLET, FILM COATED ORAL 2 TIMES DAILY
Qty: 60 TABLET | Refills: 11 | Status: CANCELLED | OUTPATIENT
Start: 2024-11-26 | End: 2025-11-26

## 2024-11-26 RX ORDER — ALPRAZOLAM 1 MG/1
1 TABLET ORAL 2 TIMES DAILY PRN
Qty: 60 TABLET | Refills: 0 | Status: SHIPPED | OUTPATIENT
Start: 2025-01-25 | End: 2025-02-24

## 2024-11-26 RX ORDER — ALPRAZOLAM 1 MG/1
1 TABLET ORAL 2 TIMES DAILY PRN
Qty: 60 TABLET | Refills: 0 | Status: SHIPPED | OUTPATIENT
Start: 2024-12-26 | End: 2025-01-25

## 2024-11-26 RX ORDER — ATOGEPANT 60 MG/1
60 TABLET ORAL DAILY
Qty: 30 TABLET | Refills: 11 | Status: CANCELLED | OUTPATIENT
Start: 2024-11-26

## 2024-11-26 RX ORDER — ALPRAZOLAM 1 MG/1
1 TABLET ORAL 2 TIMES DAILY PRN
Qty: 60 TABLET | Refills: 0 | Status: SHIPPED | OUTPATIENT
Start: 2024-11-26 | End: 2024-12-26

## 2024-11-26 RX ORDER — IBUPROFEN 600 MG/1
600 TABLET, FILM COATED ORAL EVERY 6 HOURS PRN
Qty: 360 TABLET | Refills: 3 | Status: CANCELLED | OUTPATIENT
Start: 2024-11-26

## 2024-11-26 RX ORDER — BUTALBITAL, ACETAMINOPHEN AND CAFFEINE 50; 325; 40 MG/1; MG/1; MG/1
TABLET ORAL
Qty: 15 TABLET | Refills: 0 | Status: CANCELLED | OUTPATIENT
Start: 2024-11-26

## 2024-11-26 RX ORDER — SERTRALINE HYDROCHLORIDE 100 MG/1
100 TABLET, FILM COATED ORAL DAILY
Qty: 90 TABLET | Refills: 3 | Status: CANCELLED | OUTPATIENT
Start: 2024-11-26

## 2024-11-26 ASSESSMENT — PATIENT HEALTH QUESTIONNAIRE - PHQ9
6. FEELING BAD ABOUT YOURSELF - OR THAT YOU ARE A FAILURE OR HAVE LET YOURSELF OR YOUR FAMILY DOWN: NOT AT ALL
SUM OF ALL RESPONSES TO PHQ QUESTIONS 1-9: 9
10. IF YOU CHECKED OFF ANY PROBLEMS, HOW DIFFICULT HAVE THESE PROBLEMS MADE IT FOR YOU TO DO YOUR WORK, TAKE CARE OF THINGS AT HOME, OR GET ALONG WITH OTHER PEOPLE: NOT DIFFICULT AT ALL
4. FEELING TIRED OR HAVING LITTLE ENERGY: NEARLY EVERY DAY
7. TROUBLE CONCENTRATING ON THINGS, SUCH AS READING THE NEWSPAPER OR WATCHING TELEVISION: NOT AT ALL
9. THOUGHTS THAT YOU WOULD BE BETTER OFF DEAD, OR OF HURTING YOURSELF: NOT AT ALL
2. FEELING DOWN, DEPRESSED OR HOPELESS: NOT AT ALL
SUM OF ALL RESPONSES TO PHQ QUESTIONS 1-9: 9
3. TROUBLE FALLING OR STAYING ASLEEP: NEARLY EVERY DAY
SUM OF ALL RESPONSES TO PHQ QUESTIONS 1-9: 9
5. POOR APPETITE OR OVEREATING: NEARLY EVERY DAY
SUM OF ALL RESPONSES TO PHQ QUESTIONS 1-9: 9
8. MOVING OR SPEAKING SO SLOWLY THAT OTHER PEOPLE COULD HAVE NOTICED. OR THE OPPOSITE, BEING SO FIGETY OR RESTLESS THAT YOU HAVE BEEN MOVING AROUND A LOT MORE THAN USUAL: NOT AT ALL
SUM OF ALL RESPONSES TO PHQ9 QUESTIONS 1 & 2: 0
1. LITTLE INTEREST OR PLEASURE IN DOING THINGS: NOT AT ALL

## 2024-11-26 ASSESSMENT — ENCOUNTER SYMPTOMS
RESPIRATORY NEGATIVE: 1
GASTROINTESTINAL NEGATIVE: 1

## 2024-11-26 NOTE — PATIENT INSTRUCTIONS
We are committed to providing you the best care possible.    If you receive a survey after visiting one of our offices, please take time to share your experience concerning your physician office visit.  These surveys are confidential and no health information about you is shared.    We are eager to improve for you and continue to give you satisfactory care, we are counting on your feedback to help make that happen.            Welcome to [facility]:    Did you know we now have a faster way for you to move through your appointment? For your convenience, we now have digital registration available. When you schedule your next appointment, you will receive a link via your email as well as a text message that will allow you to complete any paperwork digitally before your appointment.

## 2024-11-26 NOTE — PROGRESS NOTES
tablet by mouth daily 90 tablet 3    ibuprofen (ADVIL;MOTRIN) 600 MG tablet Take 1 tablet by mouth every 6 hours as needed for Pain 360 tablet 3    senna (SENOKOT) 8.6 MG tablet Take 1 tablet by mouth 2 times daily 60 tablet 11     No current facility-administered medications for this visit.       /70 (Site: Left Upper Arm, Position: Sitting, Cuff Size: Medium Adult)   Pulse 88   Resp 16   Ht 1.626 m (5' 4\")   Wt 105.3 kg (232 lb 3.2 oz)   SpO2 96%   BMI 39.86 kg/m²     Review of Systems   Constitutional: Negative.    Respiratory: Negative.     Cardiovascular: Negative.    Gastrointestinal: Negative.    Endocrine: Negative.    Neurological: Negative.    Psychiatric/Behavioral: Negative.            Objective   Physical Exam  Vitals and nursing note reviewed.   Constitutional:       General: She is not in acute distress.     Appearance: Normal appearance. She is not ill-appearing or toxic-appearing.   Cardiovascular:      Rate and Rhythm: Normal rate and regular rhythm.      Pulses: Normal pulses.      Heart sounds: Normal heart sounds. No murmur heard.  Pulmonary:      Effort: Pulmonary effort is normal. No respiratory distress.      Breath sounds: Normal breath sounds. No wheezing or rhonchi.   Neurological:      General: No focal deficit present.      Mental Status: She is alert and oriented to person, place, and time.   Psychiatric:         Mood and Affect: Mood normal.         Behavior: Behavior normal.            Assessment & Plan   1. RC (generalized anxiety disorder)  - ALPRAZolam (XANAX) 1 MG tablet; Take 1 tablet by mouth 2 times daily as needed for Anxiety for up to 30 days. Max Daily Amount: 2 mg  Dispense: 60 tablet; Refill: 0  - ALPRAZolam (XANAX) 1 MG tablet; Take 1 tablet by mouth 2 times daily as needed for Anxiety for up to 30 days. Max Daily Amount: 2 mg  Dispense: 60 tablet; Refill: 0  - ALPRAZolam (XANAX) 1 MG tablet; Take 1 tablet by mouth 2 times daily as needed for Anxiety for up

## 2024-12-17 DIAGNOSIS — R51.9 CHRONIC NONINTRACTABLE HEADACHE, UNSPECIFIED HEADACHE TYPE: ICD-10-CM

## 2024-12-17 DIAGNOSIS — G89.29 CHRONIC NONINTRACTABLE HEADACHE, UNSPECIFIED HEADACHE TYPE: ICD-10-CM

## 2024-12-20 RX ORDER — BUTALBITAL, ACETAMINOPHEN AND CAFFEINE 50; 325; 40 MG/1; MG/1; MG/1
TABLET ORAL
Qty: 15 TABLET | Refills: 0 | Status: SHIPPED | OUTPATIENT
Start: 2024-12-20

## 2025-01-22 DIAGNOSIS — R51.9 CHRONIC NONINTRACTABLE HEADACHE, UNSPECIFIED HEADACHE TYPE: ICD-10-CM

## 2025-01-22 DIAGNOSIS — G89.29 CHRONIC NONINTRACTABLE HEADACHE, UNSPECIFIED HEADACHE TYPE: ICD-10-CM

## 2025-01-22 RX ORDER — BUTALBITAL, ACETAMINOPHEN AND CAFFEINE 50; 325; 40 MG/1; MG/1; MG/1
TABLET ORAL
Qty: 15 TABLET | Refills: 0 | Status: SHIPPED | OUTPATIENT
Start: 2025-01-22

## 2025-02-24 DIAGNOSIS — R51.9 CHRONIC NONINTRACTABLE HEADACHE, UNSPECIFIED HEADACHE TYPE: ICD-10-CM

## 2025-02-24 DIAGNOSIS — G89.29 CHRONIC NONINTRACTABLE HEADACHE, UNSPECIFIED HEADACHE TYPE: ICD-10-CM

## 2025-02-24 RX ORDER — BUTALBITAL, ACETAMINOPHEN AND CAFFEINE 50; 325; 40 MG/1; MG/1; MG/1
TABLET ORAL
Qty: 15 TABLET | Refills: 0 | Status: CANCELLED | OUTPATIENT
Start: 2025-02-24

## 2025-02-24 NOTE — TELEPHONE ENCOUNTER
Last Visit: 10/22/24    No follow up on file.     Rx is pending.     Requested Prescriptions     Pending Prescriptions Disp Refills    butalbital-acetaminophen-caffeine (FIORICET, ESGIC) -40 MG per tablet 15 tablet 0     Sig: TAKE 1 TABLET BY MOUTH EVERY 6 HOURS AS NEEDED FOR MIGRAINE. MAX OF 4 PER DAY, no more than 15 tablets per month

## 2025-02-25 RX ORDER — BUTALBITAL, ACETAMINOPHEN AND CAFFEINE 50; 325; 40 MG/1; MG/1; MG/1
TABLET ORAL
Qty: 15 TABLET | Refills: 0 | Status: SHIPPED | OUTPATIENT
Start: 2025-02-25

## 2025-03-12 ENCOUNTER — APPOINTMENT (OUTPATIENT)
Dept: CT IMAGING | Age: 45
End: 2025-03-12
Payer: COMMERCIAL

## 2025-03-12 ENCOUNTER — HOSPITAL ENCOUNTER (EMERGENCY)
Age: 45
Discharge: HOME | End: 2025-03-12
Attending: EMERGENCY MEDICINE
Payer: COMMERCIAL

## 2025-03-12 VITALS
BODY MASS INDEX: 35.44 KG/M2 | DIASTOLIC BLOOD PRESSURE: 90 MMHG | RESPIRATION RATE: 18 BRPM | OXYGEN SATURATION: 99 % | SYSTOLIC BLOOD PRESSURE: 107 MMHG | TEMPERATURE: 97.9 F | HEART RATE: 90 BPM | HEIGHT: 63 IN | WEIGHT: 200 LBS

## 2025-03-12 DIAGNOSIS — R10.9 ABDOMINAL PAIN, UNSPECIFIED ABDOMINAL LOCATION: Primary | ICD-10-CM

## 2025-03-12 DIAGNOSIS — R74.8 ELEVATED LIVER ENZYMES: ICD-10-CM

## 2025-03-12 DIAGNOSIS — R11.0 NAUSEA: ICD-10-CM

## 2025-03-12 LAB
ALBUMIN SERPL-MCNC: 3.8 G/DL (ref 3.4–5)
ALBUMIN/GLOB SERPL: 1.4 {RATIO}
ALP SERPL-CCNC: 138 U/L (ref 40–129)
ALT SERPL-CCNC: 109 U/L (ref 10–40)
AMPHET UR QL SCN: NEGATIVE
ANION GAP SERPL CALCULATED.3IONS-SCNC: 15 MMOL/L (ref 9–17)
AST SERPL-CCNC: 185 U/L (ref 15–37)
B-HCG SERPL EIA 3RD IS-ACNC: <1 MIU/ML
BARBITURATES UR QL SCN: POSITIVE
BASOPHILS # BLD: 0.06 K/UL
BASOPHILS NFR BLD: 0 % (ref 0–1)
BENZODIAZ UR QL: POSITIVE
BILIRUB SERPL-MCNC: 0.4 MG/DL (ref 0–1)
BILIRUB UR QL STRIP: NEGATIVE
BUN SERPL-MCNC: 22 MG/DL (ref 7–20)
CALCIUM SERPL-MCNC: 8.2 MG/DL (ref 8.3–10.6)
CANNABINOIDS UR QL SCN: NEGATIVE
CHLORIDE SERPL-SCNC: 106 MMOL/L (ref 99–110)
CLARITY UR: CLEAR
CO2 SERPL-SCNC: 19 MMOL/L (ref 21–32)
COCAINE UR QL SCN: NEGATIVE
COLOR UR: YELLOW
COMMENT: NORMAL
CREAT SERPL-MCNC: 0.7 MG/DL (ref 0.6–1.1)
EOSINOPHIL # BLD: 0.23 K/UL
EOSINOPHILS RELATIVE PERCENT: 2 % (ref 0–3)
ERYTHROCYTE [DISTWIDTH] IN BLOOD BY AUTOMATED COUNT: 13.6 % (ref 11.7–14.9)
FENTANYL UR QL: NEGATIVE
GFR, ESTIMATED: >90 ML/MIN/1.73M2
GLUCOSE SERPL-MCNC: 119 MG/DL (ref 74–99)
GLUCOSE UR STRIP-MCNC: NEGATIVE MG/DL
HCT VFR BLD AUTO: 39 % (ref 37–47)
HGB BLD-MCNC: 12.7 G/DL (ref 12.5–16)
HGB UR QL STRIP.AUTO: NEGATIVE
IMM GRANULOCYTES # BLD AUTO: 0.08 K/UL
IMM GRANULOCYTES NFR BLD: 1 %
KETONES UR STRIP-MCNC: NEGATIVE MG/DL
LACTATE BLDV-SCNC: 2.1 MMOL/L (ref 0.4–2)
LEUKOCYTE ESTERASE UR QL STRIP: NEGATIVE
LIPASE SERPL-CCNC: 33 U/L (ref 13–60)
LYMPHOCYTES NFR BLD: 1.94 K/UL
LYMPHOCYTES RELATIVE PERCENT: 14 % (ref 24–44)
MCH RBC QN AUTO: 28.3 PG (ref 27–31)
MCHC RBC AUTO-ENTMCNC: 32.6 G/DL (ref 32–36)
MCV RBC AUTO: 87.1 FL (ref 78–100)
MONOCYTES NFR BLD: 0.63 K/UL
MONOCYTES NFR BLD: 5 % (ref 0–4)
NEUTROPHILS NFR BLD: 78 % (ref 36–66)
NEUTS SEG NFR BLD: 10.59 K/UL
NITRITE UR QL STRIP: NEGATIVE
OPIATES UR QL SCN: POSITIVE
OXYCODONE UR QL SCN: NEGATIVE
PH UR STRIP: 6.5 [PH] (ref 5–8)
PLATELET # BLD AUTO: 335 K/UL (ref 140–440)
PMV BLD AUTO: 10.4 FL (ref 7.5–11.1)
POTASSIUM SERPL-SCNC: 4.9 MMOL/L (ref 3.5–5.1)
PROT SERPL-MCNC: 6.4 G/DL (ref 6.4–8.2)
PROT UR STRIP-MCNC: NEGATIVE MG/DL
RBC # BLD AUTO: 4.48 M/UL (ref 4.2–5.4)
SODIUM SERPL-SCNC: 140 MMOL/L (ref 136–145)
SP GR UR STRIP: 1.02 (ref 1–1.03)
TEST INFORMATION: ABNORMAL
UROBILINOGEN UR STRIP-ACNC: 0.2 EU/DL (ref 0–1)
WBC OTHER # BLD: 13.5 K/UL (ref 4–10.5)

## 2025-03-12 PROCEDURE — 74177 CT ABD & PELVIS W/CONTRAST: CPT

## 2025-03-12 PROCEDURE — 6360000002 HC RX W HCPCS: Performed by: EMERGENCY MEDICINE

## 2025-03-12 PROCEDURE — 85025 COMPLETE CBC W/AUTO DIFF WBC: CPT

## 2025-03-12 PROCEDURE — 96376 TX/PRO/DX INJ SAME DRUG ADON: CPT

## 2025-03-12 PROCEDURE — 93005 ELECTROCARDIOGRAM TRACING: CPT | Performed by: EMERGENCY MEDICINE

## 2025-03-12 PROCEDURE — 83690 ASSAY OF LIPASE: CPT

## 2025-03-12 PROCEDURE — 83605 ASSAY OF LACTIC ACID: CPT

## 2025-03-12 PROCEDURE — 81003 URINALYSIS AUTO W/O SCOPE: CPT

## 2025-03-12 PROCEDURE — 6360000004 HC RX CONTRAST MEDICATION: Performed by: EMERGENCY MEDICINE

## 2025-03-12 PROCEDURE — 2580000003 HC RX 258: Performed by: EMERGENCY MEDICINE

## 2025-03-12 PROCEDURE — 96375 TX/PRO/DX INJ NEW DRUG ADDON: CPT

## 2025-03-12 PROCEDURE — 99285 EMERGENCY DEPT VISIT HI MDM: CPT

## 2025-03-12 PROCEDURE — 80053 COMPREHEN METABOLIC PANEL: CPT

## 2025-03-12 PROCEDURE — 2500000003 HC RX 250 WO HCPCS: Performed by: EMERGENCY MEDICINE

## 2025-03-12 PROCEDURE — 96374 THER/PROPH/DIAG INJ IV PUSH: CPT

## 2025-03-12 PROCEDURE — 6370000000 HC RX 637 (ALT 250 FOR IP): Performed by: EMERGENCY MEDICINE

## 2025-03-12 PROCEDURE — 80307 DRUG TEST PRSMV CHEM ANLYZR: CPT

## 2025-03-12 PROCEDURE — 84702 CHORIONIC GONADOTROPIN TEST: CPT

## 2025-03-12 RX ORDER — MORPHINE SULFATE 4 MG/ML
4 INJECTION, SOLUTION INTRAMUSCULAR; INTRAVENOUS ONCE
Refills: 0 | Status: COMPLETED | OUTPATIENT
Start: 2025-03-12 | End: 2025-03-12

## 2025-03-12 RX ORDER — ONDANSETRON 2 MG/ML
4 INJECTION INTRAMUSCULAR; INTRAVENOUS EVERY 30 MIN PRN
Status: DISCONTINUED | OUTPATIENT
Start: 2025-03-12 | End: 2025-03-12 | Stop reason: HOSPADM

## 2025-03-12 RX ORDER — ONDANSETRON 2 MG/ML
4 INJECTION INTRAMUSCULAR; INTRAVENOUS EVERY 30 MIN PRN
Status: DISCONTINUED | OUTPATIENT
Start: 2025-03-12 | End: 2025-03-12

## 2025-03-12 RX ORDER — DICYCLOMINE HYDROCHLORIDE 10 MG/ML
20 INJECTION INTRAMUSCULAR ONCE
Status: DISCONTINUED | OUTPATIENT
Start: 2025-03-12 | End: 2025-03-12 | Stop reason: HOSPADM

## 2025-03-12 RX ORDER — LIDOCAINE HYDROCHLORIDE 20 MG/ML
15 SOLUTION OROPHARYNGEAL ONCE
Status: COMPLETED | OUTPATIENT
Start: 2025-03-12 | End: 2025-03-12

## 2025-03-12 RX ORDER — ONDANSETRON 2 MG/ML
4 INJECTION INTRAMUSCULAR; INTRAVENOUS ONCE
Status: COMPLETED | OUTPATIENT
Start: 2025-03-12 | End: 2025-03-12

## 2025-03-12 RX ORDER — MAGNESIUM HYDROXIDE/ALUMINUM HYDROXICE/SIMETHICONE 120; 1200; 1200 MG/30ML; MG/30ML; MG/30ML
30 SUSPENSION ORAL ONCE
Status: COMPLETED | OUTPATIENT
Start: 2025-03-12 | End: 2025-03-12

## 2025-03-12 RX ORDER — MORPHINE SULFATE 2 MG/ML
2 INJECTION, SOLUTION INTRAMUSCULAR; INTRAVENOUS EVERY 30 MIN PRN
Status: DISCONTINUED | OUTPATIENT
Start: 2025-03-12 | End: 2025-03-12 | Stop reason: HOSPADM

## 2025-03-12 RX ORDER — KETOROLAC TROMETHAMINE 30 MG/ML
30 INJECTION, SOLUTION INTRAMUSCULAR; INTRAVENOUS ONCE
Status: COMPLETED | OUTPATIENT
Start: 2025-03-12 | End: 2025-03-12

## 2025-03-12 RX ORDER — IOPAMIDOL 755 MG/ML
75 INJECTION, SOLUTION INTRAVASCULAR
Status: COMPLETED | OUTPATIENT
Start: 2025-03-12 | End: 2025-03-12

## 2025-03-12 RX ADMIN — IOPAMIDOL 75 ML: 755 INJECTION, SOLUTION INTRAVENOUS at 19:58

## 2025-03-12 RX ADMIN — ALUMINUM HYDROXIDE, MAGNESIUM HYDROXIDE, AND SIMETHICONE 30 ML: 1200; 120; 1200 SUSPENSION ORAL at 18:46

## 2025-03-12 RX ADMIN — ONDANSETRON 4 MG: 2 INJECTION, SOLUTION INTRAMUSCULAR; INTRAVENOUS at 20:11

## 2025-03-12 RX ADMIN — MORPHINE SULFATE 4 MG: 4 INJECTION INTRAVENOUS at 20:13

## 2025-03-12 RX ADMIN — ONDANSETRON 4 MG: 2 INJECTION, SOLUTION INTRAMUSCULAR; INTRAVENOUS at 18:36

## 2025-03-12 RX ADMIN — KETOROLAC TROMETHAMINE 30 MG: 30 INJECTION, SOLUTION INTRAMUSCULAR at 18:42

## 2025-03-12 RX ADMIN — Medication 15 ML: at 18:44

## 2025-03-12 RX ADMIN — MORPHINE SULFATE 2 MG: 2 INJECTION, SOLUTION INTRAMUSCULAR; INTRAVENOUS at 18:37

## 2025-03-12 RX ADMIN — FAMOTIDINE 20 MG: 10 INJECTION, SOLUTION INTRAVENOUS at 18:39

## 2025-03-12 ASSESSMENT — LIFESTYLE VARIABLES
HOW OFTEN DO YOU HAVE A DRINK CONTAINING ALCOHOL: NEVER
HOW MANY STANDARD DRINKS CONTAINING ALCOHOL DO YOU HAVE ON A TYPICAL DAY: PATIENT DOES NOT DRINK

## 2025-03-12 ASSESSMENT — PAIN SCALES - GENERAL
PAINLEVEL_OUTOF10: 5
PAINLEVEL_OUTOF10: 9
PAINLEVEL_OUTOF10: 0

## 2025-03-12 ASSESSMENT — PAIN DESCRIPTION - ORIENTATION
ORIENTATION: UPPER

## 2025-03-12 ASSESSMENT — PAIN - FUNCTIONAL ASSESSMENT
PAIN_FUNCTIONAL_ASSESSMENT: NONE - DENIES PAIN
PAIN_FUNCTIONAL_ASSESSMENT: 0-10
PAIN_FUNCTIONAL_ASSESSMENT: ACTIVITIES ARE NOT PREVENTED
PAIN_FUNCTIONAL_ASSESSMENT: ACTIVITIES ARE NOT PREVENTED
PAIN_FUNCTIONAL_ASSESSMENT: PREVENTS OR INTERFERES SOME ACTIVE ACTIVITIES AND ADLS
PAIN_FUNCTIONAL_ASSESSMENT: PREVENTS OR INTERFERES SOME ACTIVE ACTIVITIES AND ADLS

## 2025-03-12 ASSESSMENT — PAIN DESCRIPTION - LOCATION
LOCATION: ABDOMEN

## 2025-03-12 ASSESSMENT — PAIN DESCRIPTION - DESCRIPTORS
DESCRIPTORS: THROBBING
DESCRIPTORS: SHARP
DESCRIPTORS: DISCOMFORT;SHARP

## 2025-03-12 ASSESSMENT — ENCOUNTER SYMPTOMS
EYES NEGATIVE: 1
ALLERGIC/IMMUNOLOGIC NEGATIVE: 1
RESPIRATORY NEGATIVE: 1
NAUSEA: 1
ABDOMINAL PAIN: 1

## 2025-03-12 ASSESSMENT — PAIN DESCRIPTION - FREQUENCY: FREQUENCY: CONTINUOUS

## 2025-03-12 NOTE — ED TRIAGE NOTES
Patient presented to ED with complaints of abd pain that started after she at lunch today around 1330. Denies any n/v/d or urinary problems.

## 2025-03-13 LAB
EKG ATRIAL RATE: 91 BPM
EKG DIAGNOSIS: NORMAL
EKG P AXIS: 45 DEGREES
EKG P-R INTERVAL: 158 MS
EKG Q-T INTERVAL: 378 MS
EKG QRS DURATION: 78 MS
EKG QTC CALCULATION (BAZETT): 464 MS
EKG R AXIS: 57 DEGREES
EKG T AXIS: 50 DEGREES
EKG VENTRICULAR RATE: 91 BPM

## 2025-03-13 PROCEDURE — 93010 ELECTROCARDIOGRAM REPORT: CPT | Performed by: INTERNAL MEDICINE

## 2025-03-13 NOTE — ED PROVIDER NOTES
[facility]      TRIAGE CHIEF COMPLAINT:   Abdominal Pain      [place]:  Live Torres is a 44 y.o. female that presents with complaint of abdominal pain nausea.  Patient states that she is doing fine today she is not sure if she has food poisoning or what but she feels like she is dying.  She has to go to [place] for tomorrow she states she has no time for this.  She states that around noon today she had lunch, had a food truck stand, barbecue sandwich shortly thereafter she had epigastric pain has been nonstop nausea.  No fevers chest pain shortness of breath diarrhea just upper abdominal pain.  No abdominal surgeries except for tubal type.  No other questions or concerns no travel sick contacts not take any medicine except for ibuprofen.  No black or bloody stools.  No other symptoms or questions or concerns.  No history of Crohn's IBS ulcerative colitis..    REVIEW OF SYSTEMS:  At least 10 systems reviewed and otherwise acutely negative except as in the [place].    Review of Systems   Constitutional: Negative.    HENT: Negative.     Eyes: Negative.    Respiratory: Negative.     Cardiovascular: Negative.    Gastrointestinal:  Positive for abdominal pain and nausea.   Endocrine: Negative.    Genitourinary: Negative.    Musculoskeletal: Negative.    Skin: Negative.    Allergic/Immunologic: Negative.    Neurological: Negative.    Hematological: Negative.    Psychiatric/Behavioral: Negative.     All other systems reviewed and are negative.      Past Medical History:   Diagnosis Date    Acute bronchitis due to infection 12/21/2022    Acute suppurative otitis media without spontaneous rupture of ear drum, unspecified ear 11/19/2021    Anxiety disorder, unspecified 10/19/2020    Depression     Gestational diabetes mellitus     Headache     migraines    Impacted cerumen, right ear 11/05/2021    Lumbar sprain     Migraine      Past Surgical History:   Procedure Laterality Date    ABDOMINAL 
enzymes        New Prescriptions    No medications on file                 Marino Mahan,   03/12/25 2033

## 2025-03-17 ENCOUNTER — COMMUNITY OUTREACH (OUTPATIENT)
Age: 45
End: 2025-03-17

## 2025-03-24 ENCOUNTER — TELEPHONE (OUTPATIENT)
Age: 45
End: 2025-03-24

## 2025-03-24 NOTE — TELEPHONE ENCOUNTER
Pt. States she was in [facility] and had to have emergency Gall bladder surgery. Pt. Has 4 incisions one was for a drain tube which has to be removed pt. Pt. Was hospitalized for one week.  Surgery was done at [facility] [place] [facility]. Pt. Needs follow up information for what to do. Pt. States she was not given. Work note no restrictions no information regarding. Diet. Pt. Is scheduled to be seen tomorrow. Pt. Advised message being sent to provider for advisement.

## 2025-03-25 ENCOUNTER — HOSPITAL ENCOUNTER (INPATIENT)
Age: 45
LOS: 4 days | Discharge: HOME | End: 2025-03-29
Attending: STUDENT IN AN ORGANIZED HEALTH CARE EDUCATION/TRAINING PROGRAM | Admitting: STUDENT IN AN ORGANIZED HEALTH CARE EDUCATION/TRAINING PROGRAM
Payer: COMMERCIAL

## 2025-03-25 ENCOUNTER — OFFICE VISIT (OUTPATIENT)
Age: 45
End: 2025-03-25
Payer: COMMERCIAL

## 2025-03-25 ENCOUNTER — APPOINTMENT (OUTPATIENT)
Dept: CT IMAGING | Age: 45
End: 2025-03-25
Payer: COMMERCIAL

## 2025-03-25 VITALS
DIASTOLIC BLOOD PRESSURE: 72 MMHG | RESPIRATION RATE: 20 BRPM | WEIGHT: 231.8 LBS | BODY MASS INDEX: 41.06 KG/M2 | SYSTOLIC BLOOD PRESSURE: 118 MMHG | HEART RATE: 80 BPM | OXYGEN SATURATION: 98 %

## 2025-03-25 DIAGNOSIS — R10.13 POSTOPERATIVE EPIGASTRIC ABDOMINAL PAIN: Primary | ICD-10-CM

## 2025-03-25 DIAGNOSIS — K65.1 POSTPROCEDURAL INTRAABDOMINAL ABSCESS (HCC): Primary | ICD-10-CM

## 2025-03-25 DIAGNOSIS — K65.1 INTRA-ABDOMINAL ABSCESS (HCC): ICD-10-CM

## 2025-03-25 DIAGNOSIS — G89.18 POSTOPERATIVE RIGHT LOWER QUADRANT ABDOMINAL PAIN: ICD-10-CM

## 2025-03-25 DIAGNOSIS — R10.31 POSTOPERATIVE RIGHT LOWER QUADRANT ABDOMINAL PAIN: ICD-10-CM

## 2025-03-25 DIAGNOSIS — G89.18 POSTOPERATIVE EPIGASTRIC ABDOMINAL PAIN: Primary | ICD-10-CM

## 2025-03-25 DIAGNOSIS — T81.43XA POSTPROCEDURAL INTRAABDOMINAL ABSCESS (HCC): Primary | ICD-10-CM

## 2025-03-25 DIAGNOSIS — D72.829 LEUKOCYTOSIS, UNSPECIFIED TYPE: ICD-10-CM

## 2025-03-25 DIAGNOSIS — Z90.49 S/P CHOLECYSTECTOMY: ICD-10-CM

## 2025-03-25 LAB
ALBUMIN SERPL-MCNC: 3.3 G/DL (ref 3.4–5)
ALBUMIN/GLOB SERPL: 1 {RATIO} (ref 1.1–2.2)
ALP SERPL-CCNC: 111 U/L (ref 40–129)
ALT SERPL-CCNC: 47 U/L (ref 10–40)
ANION GAP SERPL CALCULATED.3IONS-SCNC: 11 MMOL/L (ref 9–17)
AST SERPL-CCNC: 21 U/L (ref 15–37)
BASOPHILS # BLD: 0.07 K/UL
BASOPHILS NFR BLD: 1 % (ref 0–1)
BILIRUB SERPL-MCNC: 0.3 MG/DL (ref 0–1)
BUN SERPL-MCNC: 17 MG/DL (ref 7–20)
CALCIUM SERPL-MCNC: 9.4 MG/DL (ref 8.3–10.6)
CHLORIDE SERPL-SCNC: 101 MMOL/L (ref 99–110)
CO2 SERPL-SCNC: 23 MMOL/L (ref 21–32)
CREAT SERPL-MCNC: 0.4 MG/DL (ref 0.6–1.1)
EOSINOPHIL # BLD: 0.49 K/UL
EOSINOPHILS RELATIVE PERCENT: 3 % (ref 0–3)
ERYTHROCYTE [DISTWIDTH] IN BLOOD BY AUTOMATED COUNT: 13.9 % (ref 11.7–14.9)
GFR, ESTIMATED: >90 ML/MIN/1.73M2
GLUCOSE SERPL-MCNC: 120 MG/DL (ref 74–99)
HCT VFR BLD AUTO: 35 % (ref 37–47)
HGB BLD-MCNC: 11 G/DL (ref 12.5–16)
IMM GRANULOCYTES # BLD AUTO: 0.56 K/UL
IMM GRANULOCYTES NFR BLD: 4 %
INR PPP: 1
LACTATE BLDV-SCNC: 1 MMOL/L (ref 0.4–2)
LACTATE BLDV-SCNC: 1.7 MMOL/L (ref 0.4–2)
LIPASE SERPL-CCNC: 181 U/L (ref 13–60)
LYMPHOCYTES NFR BLD: 2.85 K/UL
LYMPHOCYTES RELATIVE PERCENT: 20 % (ref 24–44)
MCH RBC QN AUTO: 28.4 PG (ref 27–31)
MCHC RBC AUTO-ENTMCNC: 31.4 G/DL (ref 32–36)
MCV RBC AUTO: 90.4 FL (ref 78–100)
MONOCYTES NFR BLD: 0.83 K/UL
MONOCYTES NFR BLD: 6 % (ref 0–4)
NEUTROPHILS NFR BLD: 67 % (ref 36–66)
NEUTS SEG NFR BLD: 9.58 K/UL
PLATELET # BLD AUTO: 477 K/UL (ref 140–440)
PMV BLD AUTO: 10.4 FL (ref 7.5–11.1)
POTASSIUM SERPL-SCNC: 3.3 MMOL/L (ref 3.5–5.1)
PROT SERPL-MCNC: 6.4 G/DL (ref 6.4–8.2)
PROTHROMBIN TIME: 14 SEC (ref 11.7–14.5)
RBC # BLD AUTO: 3.87 M/UL (ref 4.2–5.4)
SODIUM SERPL-SCNC: 135 MMOL/L (ref 136–145)
WBC OTHER # BLD: 14.4 K/UL (ref 4–10.5)

## 2025-03-25 PROCEDURE — 99215 OFFICE O/P EST HI 40 MIN: CPT

## 2025-03-25 PROCEDURE — 2580000003 HC RX 258: Performed by: NURSE PRACTITIONER

## 2025-03-25 PROCEDURE — 6370000000 HC RX 637 (ALT 250 FOR IP): Performed by: STUDENT IN AN ORGANIZED HEALTH CARE EDUCATION/TRAINING PROGRAM

## 2025-03-25 PROCEDURE — 94761 N-INVAS EAR/PLS OXIMETRY MLT: CPT

## 2025-03-25 PROCEDURE — 96374 THER/PROPH/DIAG INJ IV PUSH: CPT

## 2025-03-25 PROCEDURE — 1200000000 HC SEMI PRIVATE

## 2025-03-25 PROCEDURE — 2500000003 HC RX 250 WO HCPCS: Performed by: STUDENT IN AN ORGANIZED HEALTH CARE EDUCATION/TRAINING PROGRAM

## 2025-03-25 PROCEDURE — 85025 COMPLETE CBC W/AUTO DIFF WBC: CPT

## 2025-03-25 PROCEDURE — 83605 ASSAY OF LACTIC ACID: CPT

## 2025-03-25 PROCEDURE — 85610 PROTHROMBIN TIME: CPT

## 2025-03-25 PROCEDURE — 6360000004 HC RX CONTRAST MEDICATION: Performed by: NURSE PRACTITIONER

## 2025-03-25 PROCEDURE — 6360000002 HC RX W HCPCS: Performed by: NURSE PRACTITIONER

## 2025-03-25 PROCEDURE — 99285 EMERGENCY DEPT VISIT HI MDM: CPT

## 2025-03-25 PROCEDURE — 2580000003 HC RX 258: Performed by: STUDENT IN AN ORGANIZED HEALTH CARE EDUCATION/TRAINING PROGRAM

## 2025-03-25 PROCEDURE — 87040 BLOOD CULTURE FOR BACTERIA: CPT

## 2025-03-25 PROCEDURE — 6360000002 HC RX W HCPCS: Performed by: STUDENT IN AN ORGANIZED HEALTH CARE EDUCATION/TRAINING PROGRAM

## 2025-03-25 PROCEDURE — 83690 ASSAY OF LIPASE: CPT

## 2025-03-25 PROCEDURE — 80053 COMPREHEN METABOLIC PANEL: CPT

## 2025-03-25 PROCEDURE — 74177 CT ABD & PELVIS W/CONTRAST: CPT

## 2025-03-25 RX ORDER — SODIUM CHLORIDE 0.9 % (FLUSH) 0.9 %
5-40 SYRINGE (ML) INJECTION EVERY 12 HOURS SCHEDULED
Status: DISCONTINUED | OUTPATIENT
Start: 2025-03-25 | End: 2025-03-29 | Stop reason: HOSPADM

## 2025-03-25 RX ORDER — IOPAMIDOL 755 MG/ML
80 INJECTION, SOLUTION INTRAVASCULAR
Status: COMPLETED | OUTPATIENT
Start: 2025-03-25 | End: 2025-03-25

## 2025-03-25 RX ORDER — ONDANSETRON 4 MG/1
4 TABLET, ORALLY DISINTEGRATING ORAL EVERY 8 HOURS PRN
Status: DISCONTINUED | OUTPATIENT
Start: 2025-03-25 | End: 2025-03-29 | Stop reason: HOSPADM

## 2025-03-25 RX ORDER — ONDANSETRON 2 MG/ML
4 INJECTION INTRAMUSCULAR; INTRAVENOUS EVERY 6 HOURS PRN
Status: DISCONTINUED | OUTPATIENT
Start: 2025-03-25 | End: 2025-03-29 | Stop reason: HOSPADM

## 2025-03-25 RX ORDER — ALBUTEROL SULFATE 90 UG/1
2 INHALANT RESPIRATORY (INHALATION) 4 TIMES DAILY PRN
Status: DISCONTINUED | OUTPATIENT
Start: 2025-03-25 | End: 2025-03-29 | Stop reason: HOSPADM

## 2025-03-25 RX ORDER — ACETAMINOPHEN 650 MG/1
650 SUPPOSITORY RECTAL EVERY 6 HOURS PRN
Status: DISCONTINUED | OUTPATIENT
Start: 2025-03-25 | End: 2025-03-29 | Stop reason: HOSPADM

## 2025-03-25 RX ORDER — ENOXAPARIN SODIUM 100 MG/ML
30 INJECTION SUBCUTANEOUS 2 TIMES DAILY
Status: DISCONTINUED | OUTPATIENT
Start: 2025-03-25 | End: 2025-03-29 | Stop reason: HOSPADM

## 2025-03-25 RX ORDER — SODIUM CHLORIDE 9 MG/ML
INJECTION, SOLUTION INTRAVENOUS PRN
Status: DISCONTINUED | OUTPATIENT
Start: 2025-03-25 | End: 2025-03-29 | Stop reason: HOSPADM

## 2025-03-25 RX ORDER — MAGNESIUM SULFATE IN WATER 40 MG/ML
2000 INJECTION, SOLUTION INTRAVENOUS PRN
Status: DISCONTINUED | OUTPATIENT
Start: 2025-03-25 | End: 2025-03-29 | Stop reason: HOSPADM

## 2025-03-25 RX ORDER — POTASSIUM CHLORIDE 1500 MG/1
40 TABLET, EXTENDED RELEASE ORAL PRN
Status: DISCONTINUED | OUTPATIENT
Start: 2025-03-25 | End: 2025-03-29 | Stop reason: HOSPADM

## 2025-03-25 RX ORDER — POTASSIUM CHLORIDE 7.45 MG/ML
10 INJECTION INTRAVENOUS PRN
Status: DISCONTINUED | OUTPATIENT
Start: 2025-03-25 | End: 2025-03-29 | Stop reason: HOSPADM

## 2025-03-25 RX ORDER — ACETAMINOPHEN 325 MG/1
650 TABLET ORAL EVERY 6 HOURS PRN
Status: DISCONTINUED | OUTPATIENT
Start: 2025-03-25 | End: 2025-03-29 | Stop reason: HOSPADM

## 2025-03-25 RX ORDER — SODIUM CHLORIDE 0.9 % (FLUSH) 0.9 %
5-40 SYRINGE (ML) INJECTION PRN
Status: DISCONTINUED | OUTPATIENT
Start: 2025-03-25 | End: 2025-03-29 | Stop reason: HOSPADM

## 2025-03-25 RX ORDER — POLYETHYLENE GLYCOL 3350 17 G/17G
17 POWDER, FOR SOLUTION ORAL DAILY PRN
Status: DISCONTINUED | OUTPATIENT
Start: 2025-03-25 | End: 2025-03-29 | Stop reason: HOSPADM

## 2025-03-25 RX ORDER — SODIUM CHLORIDE, SODIUM LACTATE, POTASSIUM CHLORIDE, CALCIUM CHLORIDE 600; 310; 30; 20 MG/100ML; MG/100ML; MG/100ML; MG/100ML
INJECTION, SOLUTION INTRAVENOUS CONTINUOUS
Status: DISCONTINUED | OUTPATIENT
Start: 2025-03-25 | End: 2025-03-27

## 2025-03-25 RX ADMIN — PIPERACILLIN AND TAZOBACTAM 3375 MG: 3; .375 INJECTION, POWDER, LYOPHILIZED, FOR SOLUTION INTRAVENOUS at 18:35

## 2025-03-25 RX ADMIN — ACETAMINOPHEN 650 MG: 325 TABLET ORAL at 21:18

## 2025-03-25 RX ADMIN — SODIUM CHLORIDE, PRESERVATIVE FREE 10 ML: 5 INJECTION INTRAVENOUS at 22:56

## 2025-03-25 RX ADMIN — SODIUM CHLORIDE, PRESERVATIVE FREE 10 ML: 5 INJECTION INTRAVENOUS at 21:18

## 2025-03-25 RX ADMIN — PIPERACILLIN AND TAZOBACTAM 3375 MG: 3; .375 INJECTION, POWDER, LYOPHILIZED, FOR SOLUTION INTRAVENOUS at 23:18

## 2025-03-25 RX ADMIN — ENOXAPARIN SODIUM 30 MG: 100 INJECTION SUBCUTANEOUS at 21:18

## 2025-03-25 RX ADMIN — IOPAMIDOL 80 ML: 755 INJECTION, SOLUTION INTRAVENOUS at 17:22

## 2025-03-25 RX ADMIN — SODIUM CHLORIDE, SODIUM LACTATE, POTASSIUM CHLORIDE, AND CALCIUM CHLORIDE: .6; .31; .03; .02 INJECTION, SOLUTION INTRAVENOUS at 22:56

## 2025-03-25 SDOH — ECONOMIC STABILITY: FOOD INSECURITY: WITHIN THE PAST 12 MONTHS, YOU WORRIED THAT YOUR FOOD WOULD RUN OUT BEFORE YOU GOT MONEY TO BUY MORE.: NEVER TRUE

## 2025-03-25 SDOH — ECONOMIC STABILITY: FOOD INSECURITY: WITHIN THE PAST 12 MONTHS, THE FOOD YOU BOUGHT JUST DIDN'T LAST AND YOU DIDN'T HAVE MONEY TO GET MORE.: NEVER TRUE

## 2025-03-25 ASSESSMENT — PAIN DESCRIPTION - LOCATION
LOCATION: ABDOMEN
LOCATION: ABDOMEN

## 2025-03-25 ASSESSMENT — PATIENT HEALTH QUESTIONNAIRE - PHQ9
9. THOUGHTS THAT YOU WOULD BE BETTER OFF DEAD, OR OF HURTING YOURSELF: NOT AT ALL
SUM OF ALL RESPONSES TO PHQ QUESTIONS 1-9: 0
2. FEELING DOWN, DEPRESSED OR HOPELESS: NOT AT ALL
1. LITTLE INTEREST OR PLEASURE IN DOING THINGS: NOT AT ALL
3. TROUBLE FALLING OR STAYING ASLEEP: NOT AT ALL
10. IF YOU CHECKED OFF ANY PROBLEMS, HOW DIFFICULT HAVE THESE PROBLEMS MADE IT FOR YOU TO DO YOUR WORK, TAKE CARE OF THINGS AT HOME, OR GET ALONG WITH OTHER PEOPLE: NOT DIFFICULT AT ALL
4. FEELING TIRED OR HAVING LITTLE ENERGY: NOT AT ALL
8. MOVING OR SPEAKING SO SLOWLY THAT OTHER PEOPLE COULD HAVE NOTICED. OR THE OPPOSITE, BEING SO FIGETY OR RESTLESS THAT YOU HAVE BEEN MOVING AROUND A LOT MORE THAN USUAL: NOT AT ALL
6. FEELING BAD ABOUT YOURSELF - OR THAT YOU ARE A FAILURE OR HAVE LET YOURSELF OR YOUR FAMILY DOWN: NOT AT ALL
7. TROUBLE CONCENTRATING ON THINGS, SUCH AS READING THE NEWSPAPER OR WATCHING TELEVISION: NOT AT ALL
5. POOR APPETITE OR OVEREATING: NOT AT ALL
SUM OF ALL RESPONSES TO PHQ QUESTIONS 1-9: 0

## 2025-03-25 ASSESSMENT — ENCOUNTER SYMPTOMS
RESPIRATORY NEGATIVE: 1
BLOOD IN STOOL: 0
ABDOMINAL PAIN: 1
ABDOMINAL PAIN: 1
EYES NEGATIVE: 1
VOMITING: 0
DIARRHEA: 0
ABDOMINAL DISTENTION: 0
ABDOMINAL DISTENTION: 1
NAUSEA: 0
NAUSEA: 1
DIARRHEA: 0
VOMITING: 0
SHORTNESS OF BREATH: 0
RESPIRATORY NEGATIVE: 1

## 2025-03-25 ASSESSMENT — PAIN DESCRIPTION - DESCRIPTORS: DESCRIPTORS: ACHING;DISCOMFORT

## 2025-03-25 ASSESSMENT — PAIN DESCRIPTION - ORIENTATION: ORIENTATION: UPPER;MID

## 2025-03-25 ASSESSMENT — LIFESTYLE VARIABLES
HOW MANY STANDARD DRINKS CONTAINING ALCOHOL DO YOU HAVE ON A TYPICAL DAY: PATIENT DOES NOT DRINK
HOW OFTEN DO YOU HAVE A DRINK CONTAINING ALCOHOL: NEVER

## 2025-03-25 ASSESSMENT — PAIN DESCRIPTION - FREQUENCY: FREQUENCY: INTERMITTENT

## 2025-03-25 ASSESSMENT — PAIN SCALES - GENERAL
PAINLEVEL_OUTOF10: 5
PAINLEVEL_OUTOF10: 4

## 2025-03-25 ASSESSMENT — PAIN - FUNCTIONAL ASSESSMENT
PAIN_FUNCTIONAL_ASSESSMENT: 0-10
PAIN_FUNCTIONAL_ASSESSMENT: ACTIVITIES ARE NOT PREVENTED

## 2025-03-25 ASSESSMENT — PAIN DESCRIPTION - PAIN TYPE: TYPE: ACUTE PAIN

## 2025-03-25 ASSESSMENT — PAIN DESCRIPTION - ONSET: ONSET: ON-GOING

## 2025-03-25 NOTE — ED PROVIDER NOTES
[facility] EMERGENCY DEPARTMENT  EMERGENCY DEPARTMENT ENCOUNTER        Pt Name: Live Torres  MRN: [id number]  Birthdate 1980  Date of evaluation: 3/25/2025  Provider: ANDREW HARDEN - BRADY  PCP: Mary Fabian FNP    DONYA. I have evaluated this patient.        Triage CHIEF COMPLAINT       Chief Complaint   Patient presents with    Abdominal Pain     Sent by pcp for abdominal imaging to rule out infection. Patient had emergency abdominal surgery 3/21/25.         HISTORY OF PRESENT ILLNESS      Chief Complaint: Abdominal pain    Live Torres is a 44 y.o. female who presents for evaluation of continued abdominal pain after recent cholecystectomy in a. outside hospital in [place] after traveling back from [place].  Patient had acute right upper quadrant abdominal pain on drive home.  She had known cholecystitis prior to traveling to [place] but did not want to miss her son's competition as he is a senior in high school.  Had an attack on the way home..  Patient reports she had a cholecystectomy 5 days ago.  Her course was complicated by acute pancreatitis due to suspected choledocholithiasis prior to surgery.  She had an MRCP and there was bile duct dilation but no gallstone, concern for recently passed stone.  Labs improved and she underwent cholecystectomy 5 days ago, operative course was unremarkable, had drain placement which was removed on postop day 1 as the patient was eager to get driving home and was feeling improved.  She was discharged on Augmentin and has completed this.  She followed up with PCP today and noted she has had persistent abdominal discomfort since surgery, no worsening pain.  No fevers, vomiting.  Has been having bowel movements with no difficulty.  Has only been requiring narcotics at bedtime.  Reports incisions are healing well but primary care concerned with persistent pain whether she could have intra-abdominal abscess and sent her here for  seen. Normal contrast enhancement is noted. Gallbladder:  Surgically absent. There is ill-defined fluid in the gallbladder fossa. Biliary tree: There is no evidence for intra-or extrahepatic biliary ductal dilatation. Spleen: Normal size and morphology is seen. No masses are identified. Pancreas: Normal morphology without masses or inflammatory changes. Adrenals: Normal size without masses. Kidneys: Normal size and morphology. No masses or hydronephrosis. No stones are identified. Bowel: The stomach is normally distended with no focal wall abnormality. The duodenum is normal in caliber along its course. No focal abnormality is seen. The small bowel is normal caliber. There is no focal stricture or dilatation. The colon is normal in caliber. No focal abnormality is seen. The appendix is seen and is normal. Vasculature: No evidence of aneurysm or other significant vascular pathology. No  evidence of dissection. Lymphatic system: No pathologically enlarged lymph nodes are seen. Peritoneal structures: There is a moderate amount of scattered peritoneal fluid,  predominantly in the upper abdomen. Largest collection in right paracolic gutter demonstrates mild peripheral enhancement, measuring up to 9.0 x 5.2 cm on  axial image 61. Maximum craniocaudal dimension of this collection measures up to approximately 8.5 cm on coronal image 52. Collection in the anterior abdomen is not well defined measuring up to 4.2 x 3.3 cm. Collection extending to the left upper quadrant and again appears ill-defined measuring 6.9 x 2.0 cm on axial image 38. Mesenteric fat stranding is likely postoperative.  Retroperitoneum: No focal retroperitoneal abnormality is seen. Urinary bladder and pelvic structures: The urinary bladder is distended with a smooth thin wall.  Lobulated density along the right uterus, likely a fibroid, measuring up to approximately 4.1 cm. Abdominal wall:  Postoperative changes in the anterior abdominal wall. Bones: No

## 2025-03-25 NOTE — ED NOTES
ED TO INPATIENT SBAR HANDOFF    Patient Name: Live Torres   :  1980  44 y.o.   Preferred Name  Live Torres  Family/Caregiver Present no   Restraints no   C-SSRS: Risk of Suicide: No Risk  Sitter no   Sepsis Risk Score        Situation  Chief Complaint   Patient presents with    Abdominal Pain     Sent by pcp for abdominal imaging to rule out infection. Patient had emergency abdominal surgery 3/21/25.     Brief Description of Patient's Condition: Patient arrived to ED via walk in. Patient c/o abdominal pain. Dr. Fabian sent her in for abdominal imaging to rule out infection. The patient had emergency surgery to remove her gallbladder 3/21 while in [place].   Mental Status: oriented, alert, and thought processes intact  Arrived from: home    Imaging:   CT ABDOMEN PELVIS W IV CONTRAST Additional Contrast? None   Final Result        Abnormal labs:   Abnormal Labs Reviewed   CBC WITH AUTO DIFFERENTIAL - Abnormal; Notable for the following components:       Result Value    WBC 14.4 (*)     RBC 3.87 (*)     Hemoglobin 11.0 (*)     Hematocrit 35.0 (*)     MCHC 31.4 (*)     Platelets 477 (*)     Neutrophils % 67 (*)     Lymphocytes % 20 (*)     Monocytes % 6 (*)     Immature Granulocytes % 4 (*)     All other components within normal limits   COMPREHENSIVE METABOLIC PANEL - Abnormal; Notable for the following components:    Sodium 135 (*)     Potassium 3.3 (*)     Glucose 120 (*)     Creatinine 0.4 (*)     Albumin 3.3 (*)     Albumin/Globulin Ratio 1.0 (*)     ALT 47 (*)     All other components within normal limits   LIPASE - Abnormal; Notable for the following components:    Lipase 181 (*)     All other components within normal limits        Background  History:   Past Medical History:   Diagnosis Date    Acute bronchitis due to infection 2022    Acute suppurative otitis media without spontaneous rupture of ear drum, unspecified ear 2021    Anxiety disorder, unspecified 10/19/2020    Bipolar

## 2025-03-25 NOTE — CONSULTS
Department of GeneralSurgery   Surgical Service Dr Brizuela   Consult Note    Date of Consult: 3/25/25    Reason for Consult:  intraabdominal abscess  Requesting Physician: Dr. Holley    CHIEF COMPLAINT:  abdominal pain     History Obtained From:  patient    HISTORY OF PRESENT ILLNESS:                The patient is a 44 y.o. female with history of diagnostic laparoscopy for endometriosis, tubal ligation, and laparoscopic cholecystectomy for gallstone pancreatitis who presents with persistent epigastric and RUQ pain. Patient underwent cholecystectomy on 3/21/2025 in [place], [place] after recovering from gallstone pancreatitis. Patient was unable to tell me if she had total cholecystectomy or subtotal, but she was able to recall that she had a VIKRAM drain in the RUQ that was removed on 3/22. She stated the drain had put out a small amount of \"grisel\" colored fluid prior to it being removed. She was discharged on 3/23. She continues to have epigastric and RUQ pain, but no associated nausea or vomiting. She has been tolerating PO and having bowel movements. No diarrhea. She stated she started getting chills last night which prompted her to come to the ED.     Labs remarkable for WBC 14, Hgb 11, plts 477, T bili 0.3, lipase 181. CT A/P demonstrated 3 large intraabdominal fluid collections that appear to be developing into abscesses. Most formed fluid collection seems to be the one in the right paracolic gutter. She denied history of MI, CVA, DVT, or PE. She is on no blood thinning medication.       Past Medical History:    Past Medical History:   Diagnosis Date    Acute bronchitis due to infection 12/21/2022    Acute suppurative otitis media without spontaneous rupture of ear drum, unspecified ear 11/19/2021    Anxiety disorder, unspecified 10/19/2020    Bipolar disorder (HCC)     Depression     Gestational diabetes mellitus     Headache     migraines    Hearing loss     Impacted cerumen, right ear 11/05/2021    Lumbar sprain

## 2025-03-25 NOTE — ED TRIAGE NOTES
Patient presents with c/o abdominal pain. Dr. Fabian sent her in for abdominal imaging to rule out infection. The patient had emergency surgery to remove her gallbladder 3/21 while in [place].

## 2025-03-25 NOTE — PROGRESS NOTES
Live Torres (:  1980) is a 44 y.o. female,Established patient, here for evaluation of the following chief complaint(s):  Pain (Having a lot of pain in incisions.  )      Subjective   Pt here for surgery follow up.     Pt was seen on 3/12/2025 in ER for epigastric abd pain that she thought was food poisoning. She reports it was biliary colic and she had elevated liver enzymes. She was to f/u with PCP but did not due to traveling to [place]. On 3/16/2025 abdominal pain had increased and vomiting began. She was taken to the ER in [place]. ER note reviewed. She was treated with pain medications, fluids, and diagnosed with pancreatitis and cholecystitis. They then scheduled cholecystectomy on 2025. She reports since the surgery, her abdominal pain has persisted and is now affecting her walking. She has used ibuprofen. She was prescribed percocet and has taken periodically. The pain increases with positioning. She also reports she has noticed chills that occur throughout the day. She has not been checking temperature. She is having nausea with no vomiting. Has not taken antiemetics prescribed. She has increased fluid intake.             3/25/2025     9:10 AM 2024     7:45 AM 2024     8:02 AM 2024     7:48 AM 3/12/2024     8:17 AM 2023     9:51 AM 2023     7:52 AM   PHQ Scores   PHQ2 Score 0 0 3 0 0 0 0   PHQ9 Score 0 9 12 6 4 0 9         I reviewed the medical records and past history for Live.    No Known Allergies    Current Outpatient Medications   Medication Sig Dispense Refill    butalbital-acetaminophen-caffeine (FIORICET, ESGIC) -40 MG per tablet TAKE 1 TABLET BY MOUTH EVERY 6 HOURS AS NEEDED FOR MIGRAINE. MAX OF 4 PER DAY, no more than 15 tablets per month 15 tablet 0    albuterol sulfate HFA (VENTOLIN HFA) 108 (90 Base) MCG/ACT inhaler Inhale 2 puffs into the lungs 4 times daily as needed for Wheezing 18 g 0    ibuprofen (ADVIL;MOTRIN) 600 MG tablet Take 1

## 2025-03-25 NOTE — ED NOTES
Attempted to call 1N x2 to notify of SBAR. No answer.   Called 1N tele tech. Connected with Skye CORCORAN. Notified of SBAR.

## 2025-03-26 ENCOUNTER — APPOINTMENT (OUTPATIENT)
Dept: CT IMAGING | Age: 45
End: 2025-03-26
Attending: SURGERY
Payer: COMMERCIAL

## 2025-03-26 LAB
ANION GAP SERPL CALCULATED.3IONS-SCNC: 11 MMOL/L (ref 9–17)
BASOPHILS # BLD: 0.09 K/UL
BASOPHILS NFR BLD: 1 % (ref 0–1)
BUN SERPL-MCNC: 13 MG/DL (ref 7–20)
CALCIUM SERPL-MCNC: 8.8 MG/DL (ref 8.3–10.6)
CHLORIDE SERPL-SCNC: 105 MMOL/L (ref 99–110)
CO2 SERPL-SCNC: 23 MMOL/L (ref 21–32)
CREAT SERPL-MCNC: 0.5 MG/DL (ref 0.6–1.1)
EOSINOPHIL # BLD: 0.47 K/UL
EOSINOPHILS RELATIVE PERCENT: 3 % (ref 0–3)
ERYTHROCYTE [DISTWIDTH] IN BLOOD BY AUTOMATED COUNT: 13.7 % (ref 11.7–14.9)
GFR, ESTIMATED: >90 ML/MIN/1.73M2
GLUCOSE SERPL-MCNC: 160 MG/DL (ref 74–99)
HCT VFR BLD AUTO: 31.9 % (ref 37–47)
HGB BLD-MCNC: 9.8 G/DL (ref 12.5–16)
IMM GRANULOCYTES # BLD AUTO: 0.6 K/UL
IMM GRANULOCYTES NFR BLD: 4 %
LYMPHOCYTES NFR BLD: 2.78 K/UL
LYMPHOCYTES RELATIVE PERCENT: 20 % (ref 24–44)
MAGNESIUM SERPL-MCNC: 1.8 MG/DL (ref 1.8–2.4)
MCH RBC QN AUTO: 27.6 PG (ref 27–31)
MCHC RBC AUTO-ENTMCNC: 30.7 G/DL (ref 32–36)
MCV RBC AUTO: 89.9 FL (ref 78–100)
MONOCYTES NFR BLD: 0.88 K/UL
MONOCYTES NFR BLD: 6 % (ref 0–4)
NEUTROPHILS NFR BLD: 66 % (ref 36–66)
NEUTS SEG NFR BLD: 9.42 K/UL
PLATELET # BLD AUTO: 464 K/UL (ref 140–440)
PMV BLD AUTO: 10.1 FL (ref 7.5–11.1)
POTASSIUM SERPL-SCNC: 3.3 MMOL/L (ref 3.5–5.1)
RBC # BLD AUTO: 3.55 M/UL (ref 4.2–5.4)
SODIUM SERPL-SCNC: 138 MMOL/L (ref 136–145)
WBC OTHER # BLD: 14.2 K/UL (ref 4–10.5)

## 2025-03-26 PROCEDURE — 87205 SMEAR GRAM STAIN: CPT

## 2025-03-26 PROCEDURE — 2580000003 HC RX 258: Performed by: STUDENT IN AN ORGANIZED HEALTH CARE EDUCATION/TRAINING PROGRAM

## 2025-03-26 PROCEDURE — 1200000000 HC SEMI PRIVATE

## 2025-03-26 PROCEDURE — 80048 BASIC METABOLIC PNL TOTAL CA: CPT

## 2025-03-26 PROCEDURE — 83735 ASSAY OF MAGNESIUM: CPT

## 2025-03-26 PROCEDURE — 36415 COLL VENOUS BLD VENIPUNCTURE: CPT

## 2025-03-26 PROCEDURE — 85025 COMPLETE CBC W/AUTO DIFF WBC: CPT

## 2025-03-26 PROCEDURE — 6370000000 HC RX 637 (ALT 250 FOR IP): Performed by: STUDENT IN AN ORGANIZED HEALTH CARE EDUCATION/TRAINING PROGRAM

## 2025-03-26 PROCEDURE — 0W9G30Z DRAINAGE OF PERITONEAL CAVITY WITH DRAINAGE DEVICE, PERCUTANEOUS APPROACH: ICD-10-PCS | Performed by: RADIOLOGY

## 2025-03-26 PROCEDURE — 84145 PROCALCITONIN (PCT): CPT

## 2025-03-26 PROCEDURE — 2500000003 HC RX 250 WO HCPCS: Performed by: STUDENT IN AN ORGANIZED HEALTH CARE EDUCATION/TRAINING PROGRAM

## 2025-03-26 PROCEDURE — 6360000002 HC RX W HCPCS

## 2025-03-26 PROCEDURE — 94761 N-INVAS EAR/PLS OXIMETRY MLT: CPT

## 2025-03-26 PROCEDURE — 76937 US GUIDE VASCULAR ACCESS: CPT

## 2025-03-26 PROCEDURE — 6370000000 HC RX 637 (ALT 250 FOR IP)

## 2025-03-26 PROCEDURE — 6360000002 HC RX W HCPCS: Performed by: STUDENT IN AN ORGANIZED HEALTH CARE EDUCATION/TRAINING PROGRAM

## 2025-03-26 PROCEDURE — 99233 SBSQ HOSP IP/OBS HIGH 50: CPT | Performed by: SURGERY

## 2025-03-26 PROCEDURE — 6360000002 HC RX W HCPCS: Performed by: RADIOLOGY

## 2025-03-26 PROCEDURE — 2709999900 CT DRAINAGE HEMATOMA/SEROMA/FLUID COLLECTION

## 2025-03-26 PROCEDURE — 87070 CULTURE OTHR SPECIMN AEROBIC: CPT

## 2025-03-26 PROCEDURE — 87075 CULTR BACTERIA EXCEPT BLOOD: CPT

## 2025-03-26 RX ORDER — MORPHINE SULFATE 2 MG/ML
2 INJECTION, SOLUTION INTRAMUSCULAR; INTRAVENOUS ONCE
Status: DISCONTINUED | OUTPATIENT
Start: 2025-03-26 | End: 2025-03-29 | Stop reason: HOSPADM

## 2025-03-26 RX ORDER — OXYCODONE HYDROCHLORIDE 5 MG/1
5 TABLET ORAL ONCE
Refills: 0 | Status: COMPLETED | OUTPATIENT
Start: 2025-03-26 | End: 2025-03-26

## 2025-03-26 RX ORDER — FENTANYL CITRATE 50 UG/ML
INJECTION, SOLUTION INTRAMUSCULAR; INTRAVENOUS PRN
Status: COMPLETED | OUTPATIENT
Start: 2025-03-26 | End: 2025-03-26

## 2025-03-26 RX ORDER — MIDAZOLAM HYDROCHLORIDE 5 MG/ML
INJECTION, SOLUTION INTRAMUSCULAR; INTRAVENOUS PRN
Status: COMPLETED | OUTPATIENT
Start: 2025-03-26 | End: 2025-03-26

## 2025-03-26 RX ADMIN — FENTANYL CITRATE 50 MCG: 50 INJECTION, SOLUTION INTRAMUSCULAR; INTRAVENOUS at 14:14

## 2025-03-26 RX ADMIN — SODIUM CHLORIDE, SODIUM LACTATE, POTASSIUM CHLORIDE, AND CALCIUM CHLORIDE: .6; .31; .03; .02 INJECTION, SOLUTION INTRAVENOUS at 15:26

## 2025-03-26 RX ADMIN — ACETAMINOPHEN 650 MG: 325 TABLET ORAL at 16:59

## 2025-03-26 RX ADMIN — MIDAZOLAM HYDROCHLORIDE 1 MG: 5 INJECTION, SOLUTION INTRAMUSCULAR; INTRAVENOUS at 14:14

## 2025-03-26 RX ADMIN — ENOXAPARIN SODIUM 30 MG: 100 INJECTION SUBCUTANEOUS at 19:51

## 2025-03-26 RX ADMIN — MIDAZOLAM HYDROCHLORIDE 1 MG: 5 INJECTION, SOLUTION INTRAMUSCULAR; INTRAVENOUS at 13:42

## 2025-03-26 RX ADMIN — SERTRALINE HYDROCHLORIDE 100 MG: 50 TABLET ORAL at 09:25

## 2025-03-26 RX ADMIN — SODIUM CHLORIDE, PRESERVATIVE FREE 10 ML: 5 INJECTION INTRAVENOUS at 19:52

## 2025-03-26 RX ADMIN — OXYCODONE HYDROCHLORIDE 5 MG: 5 TABLET ORAL at 19:49

## 2025-03-26 RX ADMIN — ACETAMINOPHEN 650 MG: 325 TABLET ORAL at 09:25

## 2025-03-26 RX ADMIN — POTASSIUM CHLORIDE 40 MEQ: 1500 TABLET, EXTENDED RELEASE ORAL at 09:25

## 2025-03-26 RX ADMIN — PIPERACILLIN AND TAZOBACTAM 3375 MG: 3; .375 INJECTION, POWDER, LYOPHILIZED, FOR SOLUTION INTRAVENOUS at 15:33

## 2025-03-26 RX ADMIN — PIPERACILLIN AND TAZOBACTAM 3375 MG: 3; .375 INJECTION, POWDER, LYOPHILIZED, FOR SOLUTION INTRAVENOUS at 06:53

## 2025-03-26 ASSESSMENT — PAIN - FUNCTIONAL ASSESSMENT: PAIN_FUNCTIONAL_ASSESSMENT: ACTIVITIES ARE NOT PREVENTED

## 2025-03-26 ASSESSMENT — PAIN DESCRIPTION - FREQUENCY: FREQUENCY: CONTINUOUS

## 2025-03-26 ASSESSMENT — PAIN DESCRIPTION - DESCRIPTORS
DESCRIPTORS: SHARP;DISCOMFORT
DESCRIPTORS: ACHING

## 2025-03-26 ASSESSMENT — ENCOUNTER SYMPTOMS
PHOTOPHOBIA: 0
STRIDOR: 0
SORE THROAT: 0
APNEA: 0
ANAL BLEEDING: 0
COLOR CHANGE: 0
ABDOMINAL PAIN: 1
CONSTIPATION: 0
CHOKING: 0
EYE ITCHING: 0
BACK PAIN: 0
EYE REDNESS: 0
RECTAL PAIN: 0

## 2025-03-26 ASSESSMENT — PAIN DESCRIPTION - LOCATION
LOCATION: HEAD
LOCATION: ABDOMEN
LOCATION: ABDOMEN

## 2025-03-26 ASSESSMENT — PAIN DESCRIPTION - ORIENTATION: ORIENTATION: MID

## 2025-03-26 ASSESSMENT — PAIN SCALES - GENERAL
PAINLEVEL_OUTOF10: 5
PAINLEVEL_OUTOF10: 3
PAINLEVEL_OUTOF10: 3

## 2025-03-26 ASSESSMENT — PAIN DESCRIPTION - PAIN TYPE: TYPE: ACUTE PAIN

## 2025-03-26 NOTE — H&P
V2.0  History and Physical      Name:  Live Torres /Age/Sex: 1980  (44 y.o. female)   MRN & CSN:  [id number] & [id number] Encounter Date/Time: 3/25/2025 10:31 PM EDT   Location:  [address] [place] [address] PCP: Mary Fabian FNP       Hospital Day: 1    Assessment and Plan:     Patient is a 44 y.o. female who presented with abdominal pain     Suspected intraabdominal abscess  - likely as a consequence of recent laparoscopic cholecystitis, surgical note reviewed from [facility], patient did have a complete gallbladder resection  -CT abdomen with fluid collection in R paracolic gutter, General surgery consult  -recommends IR consult for drain placement and culture fluid, NPO at midnight  -continue IV antibiotics zosyn    Recent episode of acute pancreatitis 2/2 gallstones  - lipase mildly elevated, CT abdomen with normal pancreas, CTM    Mood disorder  - continue home meds      Checklist:  Advanced directive: full  Diet: clear liquid and NPO at midnight  DVT ppx: Lovenox  Sugar: BG goal of 140-180 while inpatient    Disposition: admit to inpatient.  Estimated discharge: 2-3 day(s).  Current living situation: home.  Expected disposition: home.    Spoke with ED provider who recommended admission for the patient and I agree with that plan.  Personally reviewed lab studies and imaging.  EKG interpreted personally and results as stated above.  Imaging that was interpreted personally and results as stated above.      Comment: Please note this report has been produced using speech recognition software and may contain errors related to that system including errors in grammar, punctuation, and spelling, as well as words and phrases that may be inappropriate. If there are any questions or concerns please feel free to contact the dictating provider for clarification.         History of Present Illness:     Chief Complaint: Intra-abdominal abscess (HCC)  Live Torres is a 44 y.o. female with pmh of MDD,  IntraVENous 2 times per day    enoxaparin  30 mg SubCUTAneous BID      Infusions:    sodium chloride       PRN Meds: albuterol sulfate HFA, 2 puff, 4x Daily PRN  sodium chloride flush, 5-40 mL, PRN  sodium chloride, , PRN  potassium chloride, 40 mEq, PRN   Or  potassium alternative oral replacement, 40 mEq, PRN   Or  potassium chloride, 10 mEq, PRN  magnesium sulfate, 2,000 mg, PRN  ondansetron, 4 mg, Q8H PRN   Or  ondansetron, 4 mg, Q6H PRN  polyethylene glycol, 17 g, Daily PRN  acetaminophen, 650 mg, Q6H PRN   Or  acetaminophen, 650 mg, Q6H PRN        Labs      CBC:   Recent Labs     03/25/25  1623   WBC 14.4*   HGB 11.0*   *     BMP:    Recent Labs     03/25/25  1623   *   K 3.3*      CO2 23   BUN 17   CREATININE 0.4*   GLUCOSE 120*     Hepatic:   Recent Labs     03/25/25  1623   AST 21   ALT 47*   BILITOT 0.3   ALKPHOS 111     Lipids:   Lab Results   Component Value Date/Time    HDL 41 07/11/2024 08:23 AM     Hemoglobin A1C:   Lab Results   Component Value Date/Time    LABA1C 5.6 07/11/2024 08:23 AM     TSH:   Lab Results   Component Value Date/Time    TSH 1.55 05/07/2021 08:16 AM     Troponin:   Lab Results   Component Value Date/Time    TROPONINT <0.010 11/14/2022 09:10 PM     Lactic Acid: No results for input(s): \"LACTA\" in the last 72 hours.  BNP: No results for input(s): \"PROBNP\" in the last 72 hours.  UA:  Lab Results   Component Value Date/Time    NITRU NEGATIVE 03/12/2025 06:30 PM    COLORU Yellow 03/12/2025 06:30 PM    PHUR 6.5 03/12/2025 06:30 PM    WBCUA 6 11/13/2023 10:50 AM    RBCUA 0 11/13/2023 10:50 AM    BACTERIA FEW 11/13/2023 10:50 AM    CLARITYU CLEAR 11/13/2023 10:50 AM    LEUKOCYTESUR NEGATIVE 03/12/2025 06:30 PM    UROBILINOGEN 0.2 03/12/2025 06:30 PM    BILIRUBINUR NEGATIVE 03/12/2025 06:30 PM    BLOODU NEGATIVE 11/13/2023 10:50 AM    GLUCOSEU NEGATIVE 03/12/2025 06:30 PM    KETUA NEGATIVE 03/12/2025 06:30 PM     Urine Cultures: No results found for: \"LABURIN\"  Blood

## 2025-03-26 NOTE — PROGRESS NOTES
4 Eyes Skin Assessment     NAME:  Live Torres  YOB: 1980  MEDICAL RECORD NUMBER:  [id number]    The patient is being assessed for  Admission    I agree that at least one RN has performed a thorough Head to Toe Skin Assessment on the patient. ALL assessment sites listed below have been assessed.      Areas assessed by both nurses:    Head, Face, Ears, Shoulders, Back, Chest, Arms, Elbows, Hands, Sacrum. Buttock, Coccyx, Ischium, Legs. Feet and Heels, and Under Medical Devices         Does the Patient have a Wound? No noted wound(s)       Sterling Prevention initiated by RN: No  Wound Care Orders initiated by RN: No    Pressure Injury (Stage 3,4, Unstageable, DTI, NWPT, and Complex wounds) if present, place Wound referral order by RN under : No    New Ostomies, if present place, Ostomy referral order under : No     Nurse 1 eSignature: Electronically signed by Rai Hadley RN on 3/25/25 at 8:57 PM EDT    **SHARE this note so that the co-signing nurse can place an eSignature**    Nurse 2 eSignature: Electronically signed by Jim Marcano LPN on 3/25/25 at 9:23 PM EDT

## 2025-03-26 NOTE — PROGRESS NOTES
General Surgery-[facility]    Hospital Day: 2    ChiefComplaint on Admission: intraabdominal abscess s/p lap vladislav in [place]      Subjective:     Live Torres is a 44 y.o. female with post op lap vladislav in [place] now with intraabdominal abscess . Patient reports abdominal pain. Tolerating Diet NPO Exceptions are: Sips of Water with Meds, Ice Chips. - BM.     ROS:  Review of Systems   Constitutional:  Negative for chills and fever.   HENT:  Negative for ear pain, mouth sores, sore throat and tinnitus.    Eyes:  Negative for photophobia, redness and itching.   Respiratory:  Negative for apnea, choking and stridor.    Cardiovascular:  Negative for chest pain and palpitations.   Gastrointestinal:  Positive for abdominal pain. Negative for anal bleeding, constipation and rectal pain.   Endocrine: Negative for polydipsia.   Genitourinary:  Negative for enuresis, flank pain and hematuria.   Musculoskeletal:  Negative for back pain, joint swelling and myalgias.   Skin:  Negative for color change and pallor.   Allergic/Immunologic: Negative for environmental allergies.   Neurological:  Negative for syncope and speech difficulty.   Psychiatric/Behavioral:  Negative for confusion and hallucinations.        Allergies  Patient has no known allergies.          Diagnosis Date    Acute bronchitis due to infection 2022    Acute suppurative otitis media without spontaneous rupture of ear drum, unspecified ear 2021    Anxiety disorder, unspecified 10/19/2020    Bipolar disorder (HCC)     Depression     Gestational diabetes mellitus     Headache     migraines    Hearing loss     Impacted cerumen, right ear 2021    Lumbar sprain     Migraine     Obesity        Objective:     Vitals:    25 0913   BP: 129/62   Pulse: 76   Resp: 18   Temp: 98.1 °F (36.7 °C)   SpO2: 97%       TEMPERATURE:  Current - Temp: 98.1 °F (36.7 °C); Max - Temp  Av.2 °F (36.8 °C)  Min: 97.5 °F (36.4 °C)  Max: 99 °F (37.2 °C)    No intake/output  LABGLOM >90 03/26/2025    GFRAA >60 05/07/2021    AGRATIO 1.7 07/11/2024    GLOB 2.1 05/07/2021       Assessment:     Patient Active Problem List:     Obesity, unspecified     Tobacco use     Headache     Other seasonal allergic rhinitis     Unspecified hemorrhoids     Vaginal high risk human papillomavirus (HPV) DNA test positive     Conductive hearing loss, unilateral, left ear, with unrestricted hearing on the contralateral side     Conductive hearing loss, unilateral, right ear, with unrestricted hearing on the contralateral side     Encounter for drug screening     Anxiety     Impacted cerumen of right ear     Generalized abdominal pain     Moderate episode of recurrent major depressive disorder (HCC)     RC (generalized anxiety disorder)     Intra-abdominal abscess (HCC)      Plan:       Care discussed with IR for drain placement today.   Can have diet post procedure.        DAMI MILLER MD

## 2025-03-26 NOTE — OP NOTE
Operative Note      Patient: Live Torres  YOB: 1980  MRN: [id number]    Date of Procedure: 3/26/2025    PROCEDURE:    1. CT-guided percutaneous right lower quadrant abdominal drain placement in the intraperitoneal space.  2. Conscious sedation with monitoring lasting 30 minutes.    HISTORY: Patient presents with indeterminate fluid collection emanating from the gallbladder fossa area and coursing inferiorly to the right retrocolic region. In the terminus if this is infected fluid, seroma or biloma.    Technique/procedure: After obtaining informed consent, CT images of mid and lower abdomen obtained. Right anterior lateral abdomen region prepped and draped in sterile fashion. Total of 10 cc lidocaine injected.    18-gauge sheath UE needle then advanced from a lateral abdominal approach up to and into the fluid collection. Sheath was floated. Guidewire then advanced. Positioning confirmed. Tract was dilated and 12 Guatemalan locking loop catheter was then advanced. Once position, follow-up CT images obtained. Catheter sutured at exit site and sterile bandage applied.    FINDINGS: Images demonstrate unchanged size of lobular fluid collection in the right mid to lower retrocolic area. Appropriate needle positioning. Catheter appears well-positioned at lower margin. Modest amounts of slightly cloudy yellowish fluid aspirated. Specimen was sent to lab for microbiology studies. Final images demonstrate modest decreased size of the lobular area. It is unclear if this may be partially septated. No apparent complication.    IMPRESSION:  1. Successful right lower abdominal drainage catheter placement.  2. Specimen sent to lab for microbiology study.  3. Recommend gravity drainage with twice a day flushing to keep drain from clogging.  4. Consider follow-up imaging if patient fails to improve on current management pathway. Small chance fluid collection could be complex. Ideally, CT imaging with IV contrast on

## 2025-03-26 NOTE — PROGRESS NOTES
TRANSFER - OUT REPORT:    Verbal report given to Marta  felipe Torres being transferred to  for routine post-op       Report consisted of patient's Situation, Background, Assessment and   Recommendations(SBAR).     Information from the following report(s) Nurse Handoff Report was reviewed with the receiving nurse.    Opportunity for questions and clarification was provided.      Patient transported with:   Tech

## 2025-03-26 NOTE — PLAN OF CARE
Problem: Chronic Conditions and Co-morbidities  Goal: Patient's chronic conditions and co-morbidity symptoms are monitored and maintained or improved  3/26/2025 0233 by Jim Marcano LPN  Outcome: Progressing     Problem: Discharge Planning  Goal: Discharge to home or other facility with appropriate resources  3/26/2025 1135 by Lala Price RN  Outcome: Progressing  3/26/2025 0233 by Jim Marcano LPN  Outcome: Progressing     Problem: Pain  Goal: Verbalizes/displays adequate comfort level or baseline comfort level  3/26/2025 1135 by Lala Price, RN  Outcome: Progressing  3/26/2025 0233 by Jim Marcano LPN  Outcome: Progressing

## 2025-03-26 NOTE — CONSULTS
Consult completed. Nexiva 20g 1.75\" peripheral IV initiated into left forearm x 1 attempt using ultrasound guided technique. Brisk blood return, flushes without resistance. Patient tolerated well. Primary nurse Jim notified.     Consult the Vascular Access Team for questions, concerns, or change in patient's condition.

## 2025-03-26 NOTE — CARE COORDINATION
Reviewed medical record at this time for the purpose of discharge planning. Pt is from home with family. Pt is employed and has health insurance. Pt has a PCP. Pt is independent in ADL's and does not use any DME. Plan home at discharge. No needs identified. CM available if any needs arise.

## 2025-03-26 NOTE — PROGRESS NOTES
[place]2.[place]  [facility] [place]      Name:  Live Torres /Age/Sex: 1980  (44 y.o. female)   MRN & CSN:  [id number] & [id number] Encounter Date/Time: 3/26/2025 1:59 PM EDT    Location:  [facility] PCP: Mary Fabian FNP       Hospital Day: 2    Assessment and Plan:   Live Torres is a 44 y.o. female with pmh of   MDD, migraine disorder  who presents with Intra-abdominal abscess (HCC)      Plan:  Suspected intraabdominal abscess  - likely as a consequence of recent laparoscopic cholecystitis, surgical note reviewed from [facility], patient did have a complete gallbladder resection  -CT abdomen with fluid collection in R paracolic gutter, General surgery consult  -recommends IR consult for drain placement and culture fluid, NPO until surgery  -continue IV zosyn     Recent episode of acute pancreatitis 2/2 gallstones  - lipase mildly elevated, CT abdomen with normal pancreas, CTM     Mood disorder  - continue home meds    Diet Diet NPO Exceptions are: Sips of Water with Meds, Ice Chips   DVT Prophylaxis [] Lovenox, []  Heparin, [] SCDs, [] Ambulation,  [] Eliquis, [] Xarelto  [] Coumadin   Code Status Full Code   Disposition From: Home  Expected Disposition: same  Estimated Date of Discharge: 1-2 days  Patient requires continued admission due to Needs intra abdominal drain   Surrogate Decision Maker/ POA      Subjective:     Chief Complaint: Abdominal Pain (Sent by pcp for abdominal imaging to rule out infection. Patient had emergency abdominal surgery 3/21/25.)       Live Torres is a 44 y.o. female who presents with abdominal pain seen and examined at bedside still has some abdominal discomfort denies any nausea vomiting did tolerate liquid diet yesterday denies any fever or chills.         Review of Systems:    Review of Systems  As above    Objective:     Intake/Output Summary (Last 24 hours) at 3/26/2025 1359  Last data filed at 3/25/2025 2256  Gross per 24 hour  g/dL    Hematocrit 31.9 (L) 37.0 - 47.0 %    MCV 89.9 78.0 - 100.0 fL    MCH 27.6 27.0 - 31.0 pg    MCHC 30.7 (L) 32.0 - 36.0 g/dL    RDW 13.7 11.7 - 14.9 %    Platelets 464 (H) 140 - 440 k/uL    MPV 10.1 7.5 - 11.1 fL    Neutrophils % 66 36 - 66 %    Lymphocytes % 20 (L) 24 - 44 %    Monocytes % 6 (H) 0 - 4 %    Eosinophils % 3 0 - 3 %    Basophils % 1 0 - 1 %    Immature Granulocytes % 4 (H) 0 %    Neutrophils Absolute 9.42 k/uL    Lymphocytes Absolute 2.78 k/uL    Monocytes Absolute 0.88 k/uL    Eosinophils Absolute 0.47 k/uL    Basophils Absolute 0.09 k/uL    Immature Granulocytes Absolute 0.60 k/uL   Basic Metabolic Panel w/ Reflex to MG    Collection Time: 03/26/25  1:32 AM   Result Value Ref Range    Sodium 138 136 - 145 mmol/L    Potassium 3.3 (L) 3.5 - 5.1 mmol/L    Chloride 105 99 - 110 mmol/L    CO2 23 21 - 32 mmol/L    Anion Gap 11 9 - 17 mmol/L    Glucose 160 (H) 74 - 99 mg/dL    BUN 13 7 - 20 mg/dL    Creatinine 0.5 (L) 0.6 - 1.1 mg/dL    Est, Glom Filt Rate >90 >60 mL/min/1.73m2    Calcium 8.8 8.3 - 10.6 mg/dL   Magnesium    Collection Time: 03/26/25  1:32 AM   Result Value Ref Range    Magnesium 1.8 1.8 - 2.4 mg/dL        Imaging/Diagnostics Last 24 Hours   CT ABDOMEN PELVIS W IV CONTRAST Additional Contrast? None  Result Date: 3/25/2025  PROCEDURE: CT ABDOMEN PELVIS W IV CONTRAST DATE OF EXAM:  3/25/2025 17:21 DEMOGRAPHICS: 44 years old Female INDICATION: upper abdominal pain, recent cholecystectomy Contrast utilized and the relevant clinical information: IOPAMIDOL 76 % IV SOLN 80mL COMPARISON: No existing relevant imaging study corresponding to the same anatomical region is available. TECHNIQUE: Contiguous axial slices of the abdomen and pelvis were submitted after the IV administration of contrast. No oral contrast was utilized. Additional coronal reformatted images were submitted.  DOSE OPTIMIZATION: CT radiation dose optimization techniques (automated exposure  control, and use of iterative

## 2025-03-27 LAB
ANION GAP SERPL CALCULATED.3IONS-SCNC: 11 MMOL/L (ref 9–17)
BASOPHILS # BLD: 0.08 K/UL
BASOPHILS NFR BLD: 1 % (ref 0–1)
BUN SERPL-MCNC: 9 MG/DL (ref 7–20)
CALCIUM SERPL-MCNC: 9.2 MG/DL (ref 8.3–10.6)
CHLORIDE SERPL-SCNC: 103 MMOL/L (ref 99–110)
CO2 SERPL-SCNC: 23 MMOL/L (ref 21–32)
CREAT SERPL-MCNC: 0.5 MG/DL (ref 0.6–1.1)
EOSINOPHIL # BLD: 0.43 K/UL
EOSINOPHILS RELATIVE PERCENT: 4 % (ref 0–3)
ERYTHROCYTE [DISTWIDTH] IN BLOOD BY AUTOMATED COUNT: 13.3 % (ref 11.7–14.9)
GFR, ESTIMATED: >90 ML/MIN/1.73M2
GLUCOSE BLD-MCNC: 104 MG/DL (ref 74–99)
GLUCOSE BLD-MCNC: 116 MG/DL (ref 74–99)
GLUCOSE BLD-MCNC: 147 MG/DL (ref 74–99)
GLUCOSE SERPL-MCNC: 153 MG/DL (ref 74–99)
HCT VFR BLD AUTO: 34.2 % (ref 37–47)
HGB BLD-MCNC: 10.7 G/DL (ref 12.5–16)
IMM GRANULOCYTES # BLD AUTO: 0.49 K/UL
IMM GRANULOCYTES NFR BLD: 4 %
LYMPHOCYTES NFR BLD: 1.96 K/UL
LYMPHOCYTES RELATIVE PERCENT: 16 % (ref 24–44)
MCH RBC QN AUTO: 27.6 PG (ref 27–31)
MCHC RBC AUTO-ENTMCNC: 31.3 G/DL (ref 32–36)
MCV RBC AUTO: 88.4 FL (ref 78–100)
MONOCYTES NFR BLD: 0.68 K/UL
MONOCYTES NFR BLD: 6 % (ref 0–4)
NEUTROPHILS NFR BLD: 70 % (ref 36–66)
NEUTS SEG NFR BLD: 8.52 K/UL
PLATELET # BLD AUTO: 497 K/UL (ref 140–440)
PMV BLD AUTO: 9.8 FL (ref 7.5–11.1)
POTASSIUM SERPL-SCNC: 4 MMOL/L (ref 3.5–5.1)
PROCALCITONIN SERPL-MCNC: 0.07 NG/ML
RBC # BLD AUTO: 3.87 M/UL (ref 4.2–5.4)
SODIUM SERPL-SCNC: 138 MMOL/L (ref 136–145)
WBC OTHER # BLD: 12.2 K/UL (ref 4–10.5)

## 2025-03-27 PROCEDURE — 36415 COLL VENOUS BLD VENIPUNCTURE: CPT

## 2025-03-27 PROCEDURE — 82962 GLUCOSE BLOOD TEST: CPT

## 2025-03-27 PROCEDURE — 80048 BASIC METABOLIC PNL TOTAL CA: CPT

## 2025-03-27 PROCEDURE — 1200000000 HC SEMI PRIVATE

## 2025-03-27 PROCEDURE — 2580000003 HC RX 258: Performed by: STUDENT IN AN ORGANIZED HEALTH CARE EDUCATION/TRAINING PROGRAM

## 2025-03-27 PROCEDURE — 94761 N-INVAS EAR/PLS OXIMETRY MLT: CPT

## 2025-03-27 PROCEDURE — 85025 COMPLETE CBC W/AUTO DIFF WBC: CPT

## 2025-03-27 PROCEDURE — 84145 PROCALCITONIN (PCT): CPT

## 2025-03-27 PROCEDURE — 6360000002 HC RX W HCPCS: Performed by: STUDENT IN AN ORGANIZED HEALTH CARE EDUCATION/TRAINING PROGRAM

## 2025-03-27 PROCEDURE — 2500000003 HC RX 250 WO HCPCS: Performed by: STUDENT IN AN ORGANIZED HEALTH CARE EDUCATION/TRAINING PROGRAM

## 2025-03-27 PROCEDURE — 6370000000 HC RX 637 (ALT 250 FOR IP): Performed by: STUDENT IN AN ORGANIZED HEALTH CARE EDUCATION/TRAINING PROGRAM

## 2025-03-27 RX ORDER — OXYCODONE AND ACETAMINOPHEN 5; 325 MG/1; MG/1
1 TABLET ORAL EVERY 4 HOURS PRN
Refills: 0 | Status: DISCONTINUED | OUTPATIENT
Start: 2025-03-27 | End: 2025-03-29 | Stop reason: HOSPADM

## 2025-03-27 RX ADMIN — PIPERACILLIN AND TAZOBACTAM 3375 MG: 3; .375 INJECTION, POWDER, LYOPHILIZED, FOR SOLUTION INTRAVENOUS at 23:32

## 2025-03-27 RX ADMIN — ENOXAPARIN SODIUM 30 MG: 100 INJECTION SUBCUTANEOUS at 21:21

## 2025-03-27 RX ADMIN — ACETAMINOPHEN 650 MG: 325 TABLET ORAL at 08:06

## 2025-03-27 RX ADMIN — SODIUM CHLORIDE, PRESERVATIVE FREE 10 ML: 5 INJECTION INTRAVENOUS at 21:21

## 2025-03-27 RX ADMIN — PIPERACILLIN AND TAZOBACTAM 3375 MG: 3; .375 INJECTION, POWDER, LYOPHILIZED, FOR SOLUTION INTRAVENOUS at 15:10

## 2025-03-27 RX ADMIN — OXYCODONE AND ACETAMINOPHEN 1 TABLET: 325; 5 TABLET ORAL at 10:52

## 2025-03-27 RX ADMIN — SERTRALINE HYDROCHLORIDE 100 MG: 50 TABLET ORAL at 08:06

## 2025-03-27 RX ADMIN — PIPERACILLIN AND TAZOBACTAM 3375 MG: 3; .375 INJECTION, POWDER, LYOPHILIZED, FOR SOLUTION INTRAVENOUS at 00:09

## 2025-03-27 RX ADMIN — ACETAMINOPHEN 650 MG: 325 TABLET ORAL at 01:44

## 2025-03-27 RX ADMIN — ACETAMINOPHEN 650 MG: 325 TABLET ORAL at 21:30

## 2025-03-27 RX ADMIN — SODIUM CHLORIDE, SODIUM LACTATE, POTASSIUM CHLORIDE, AND CALCIUM CHLORIDE: .6; .31; .03; .02 INJECTION, SOLUTION INTRAVENOUS at 01:43

## 2025-03-27 RX ADMIN — PIPERACILLIN AND TAZOBACTAM 3375 MG: 3; .375 INJECTION, POWDER, LYOPHILIZED, FOR SOLUTION INTRAVENOUS at 06:54

## 2025-03-27 RX ADMIN — ENOXAPARIN SODIUM 30 MG: 100 INJECTION SUBCUTANEOUS at 08:05

## 2025-03-27 ASSESSMENT — PAIN DESCRIPTION - DESCRIPTORS
DESCRIPTORS: SHARP
DESCRIPTORS: ACHING
DESCRIPTORS: SORE;SHARP

## 2025-03-27 ASSESSMENT — PAIN SCALES - GENERAL
PAINLEVEL_OUTOF10: 3
PAINLEVEL_OUTOF10: 4
PAINLEVEL_OUTOF10: 4

## 2025-03-27 ASSESSMENT — PAIN DESCRIPTION - ORIENTATION
ORIENTATION: RIGHT

## 2025-03-27 ASSESSMENT — PAIN DESCRIPTION - LOCATION
LOCATION: ABDOMEN;HIP
LOCATION: HEAD
LOCATION: ABDOMEN
LOCATION: ABDOMEN;HIP

## 2025-03-27 NOTE — CARE COORDINATION
Chart reviewed and patient discussed in IDR. No new needs at this time. CM following for additional needs.

## 2025-03-27 NOTE — PROGRESS NOTES
GENERAL SURGERY PROGRESS NOTE    Live Torres is a 44 y.o. female with recent laparoscopic cholecystectomy in [place] who presented with RUQ abdominal pain found to have intraabdominal fluid collections s/p IR drain placement .                 Subjective:  Pain is significantly improved from admission. No nausea or vomiting. She stated the drainage from the IR drain was bloody yesterday, then became green, and is now light yellow/clear.     Objective:    Vitals: VITALS:  /73   Pulse 72   Temp 98.1 °F (36.7 °C) (Oral)   Resp 18   Ht 1.626 m (5' 4\")   Wt 104.8 kg (231 lb)   SpO2 97%   BMI 39.65 kg/m²   TEMPERATURE:  Current - Temp: 98.1 °F (36.7 °C); Max - Temp  Av.5 °F (36.9 °C)  Min: 98.1 °F (36.7 °C)  Max: 98.8 °F (37.1 °C)    I/O:  07 -  0700  In: 240 [P.O.:240]  Out: 130 [Drains:130]    Labs/Imaging Results:   Lab Results   Component Value Date    WBC 12.2 (H) 2025    HGB 10.7 (L) 2025    HCT 34.2 (L) 2025    MCV 88.4 2025     (H) 2025     Lab Results   Component Value Date     2025    K 4.0 2025     2025    CO2 23 2025    BUN 9 2025    CREATININE 0.5 (L) 2025    GLUCOSE 153 (H) 2025    CALCIUM 9.2 2025    BILITOT 0.3 2025    ALKPHOS 111 2025    AST 21 2025    ALT 47 (H) 2025    LABGLOM >90 2025    GFRAA >60 2021    AGRATIO 1.7 2024    GLOB 2.1 2021       IV Fluids:   sodium chloride    Scheduled Meds:   morphine, 2 mg, IntraVENous, Once    sodium chloride flush, 5-40 mL, IntraVENous, 2 times per day    enoxaparin, 30 mg, SubCUTAneous, BID    Atogepant, 60 mg, Oral, Daily    sertraline, 100 mg, Oral, Daily    piperacillin-tazobactam, 3,375 mg, IntraVENous, Q8H    Physical Exam:  General: A&O x 3, no distress, resting in bed comfortably   HEENT: Anicteric sclerae, MMM.  Extremities: No edema bilat LE.  Abdomen: Soft, obese, nondistended,

## 2025-03-27 NOTE — PROGRESS NOTES
V2.0  [facility] [place]      Name:  Live Torres /Age/Sex: 1980  (44 y.o. female)   MRN & CSN:  [id number] & [id number] Encounter Date/Time: 3/27/2025 1:59 PM EDT    Location:  1104/1104-A PCP: Mary Fabian FNP       Hospital Day: 3    Assessment and Plan:   Live Torres is a 44 y.o. female with pmh of   MDD, migraine disorder  who presents with Intra-abdominal abscess (HCC)      Plan:  Suspected intraabdominal abscess  - likely as a consequence of recent laparoscopic cholecystitis, surgical note reviewed from [facility], patient did have a complete gallbladder resection  -CT abdomen with fluid collection in R paracolic gutter, General surgery consult  -intraabdominal drain placement POD 1, 50 cc output so far  -continue IV zosyn, await cultures     Recent episode of acute pancreatitis 2/2 gallstones  - lipase mildly elevated, CT abdomen with normal pancreas, CTM     Mood disorder  - continue home meds    Diet ADULT DIET; Regular   DVT Prophylaxis [] Lovenox, []  Heparin, [] SCDs, [] Ambulation,  [] Eliquis, [] Xarelto  [] Coumadin   Code Status Full Code   Disposition From: Home  Expected Disposition: same  Estimated Date of Discharge: 1-2 days  Patient requires continued admission due to Needs intra abdominal drain   Surrogate Decision Maker/ POA      Subjective:     Chief Complaint: Abdominal Pain (Sent by pcp for abdominal imaging to rule out infection. Patient had emergency abdominal surgery 3/21/25.)       Live Torres is a 44 y.o. female who presents with abdominal pain seen and examined at bedside still has some abdominal discomfort denies any nausea vomiting did tolerate liquid diet yesterday denies any fever or chills.         Review of Systems:    Review of Systems  As above    Objective:     Intake/Output Summary (Last 24 hours) at 3/27/2025 1128  Last data filed at 3/27/2025 1108  Gross per 24 hour   Intake 480 ml   Output 130 ml   Net 350 ml  patient radiation dose. FINDINGS: Inferior chest: Trace amount of pleural fluid on the left mild dependent atelectasis or scarring favored in the lower lungs. Liver: The liver demonstrates normal appearance. No focal abnormalities are seen. Normal contrast enhancement is noted. Gallbladder:  Surgically absent. There is ill-defined fluid in the gallbladder fossa. Biliary tree: There is no evidence for intra-or extrahepatic biliary ductal dilatation. Spleen: Normal size and morphology is seen. No masses are identified. Pancreas: Normal morphology without masses or inflammatory changes. Adrenals: Normal size without masses. Kidneys: Normal size and morphology. No masses or hydronephrosis. No stones are identified. Bowel: The stomach is normally distended with no focal wall abnormality. The duodenum is normal in caliber along its course. No focal abnormality is seen. The small bowel is normal caliber. There is no focal stricture or dilatation. The colon is normal in caliber. No focal abnormality is seen. The appendix is seen and is normal. Vasculature: No evidence of aneurysm or other significant vascular pathology. No  evidence of dissection. Lymphatic system: No pathologically enlarged lymph nodes are seen. Peritoneal structures: There is a moderate amount of scattered peritoneal fluid,  predominantly in the upper abdomen. Largest collection in right paracolic gutter demonstrates mild peripheral enhancement, measuring up to 9.0 x 5.2 cm on  axial image 61. Maximum craniocaudal dimension of this collection measures up to approximately 8.5 cm on coronal image 52. Collection in the anterior abdomen is not well defined measuring up to 4.2 x 3.3 cm. Collection extending to the left upper quadrant and again appears ill-defined measuring 6.9 x 2.0 cm on axial image 38. Mesenteric fat stranding is likely postoperative.  Retroperitoneum: No focal retroperitoneal abnormality is seen. Urinary bladder and pelvic structures: The

## 2025-03-28 ENCOUNTER — APPOINTMENT (OUTPATIENT)
Dept: NUCLEAR MEDICINE | Age: 45
End: 2025-03-28
Payer: COMMERCIAL

## 2025-03-28 LAB
ALBUMIN SERPL-MCNC: 3.2 G/DL (ref 3.4–5)
ALBUMIN/GLOB SERPL: 1.1 {RATIO} (ref 1.1–2.2)
ALP SERPL-CCNC: 103 U/L (ref 40–129)
ALT SERPL-CCNC: 29 U/L (ref 10–40)
AST SERPL-CCNC: 16 U/L (ref 15–37)
BILIRUB DIRECT SERPL-MCNC: 0.2 MG/DL (ref 0–0.3)
BILIRUB INDIRECT SERPL-MCNC: ABNORMAL MG/DL (ref 0–0.7)
BILIRUB SERPL-MCNC: <0.2 MG/DL (ref 0–1)
GLUCOSE BLD-MCNC: 132 MG/DL (ref 74–99)
PROT SERPL-MCNC: 6.2 G/DL (ref 6.4–8.2)

## 2025-03-28 PROCEDURE — 36415 COLL VENOUS BLD VENIPUNCTURE: CPT

## 2025-03-28 PROCEDURE — 82962 GLUCOSE BLOOD TEST: CPT

## 2025-03-28 PROCEDURE — 80076 HEPATIC FUNCTION PANEL: CPT

## 2025-03-28 PROCEDURE — 94761 N-INVAS EAR/PLS OXIMETRY MLT: CPT

## 2025-03-28 PROCEDURE — A9537 TC99M MEBROFENIN: HCPCS

## 2025-03-28 PROCEDURE — 1200000000 HC SEMI PRIVATE

## 2025-03-28 PROCEDURE — 2580000003 HC RX 258: Performed by: STUDENT IN AN ORGANIZED HEALTH CARE EDUCATION/TRAINING PROGRAM

## 2025-03-28 PROCEDURE — 3430000000 HC RX DIAGNOSTIC RADIOPHARMACEUTICAL

## 2025-03-28 PROCEDURE — 6370000000 HC RX 637 (ALT 250 FOR IP): Performed by: STUDENT IN AN ORGANIZED HEALTH CARE EDUCATION/TRAINING PROGRAM

## 2025-03-28 PROCEDURE — 6360000002 HC RX W HCPCS: Performed by: STUDENT IN AN ORGANIZED HEALTH CARE EDUCATION/TRAINING PROGRAM

## 2025-03-28 PROCEDURE — 78226 HEPATOBILIARY SYSTEM IMAGING: CPT

## 2025-03-28 RX ADMIN — SERTRALINE HYDROCHLORIDE 100 MG: 50 TABLET ORAL at 11:21

## 2025-03-28 RX ADMIN — ENOXAPARIN SODIUM 30 MG: 100 INJECTION SUBCUTANEOUS at 21:10

## 2025-03-28 RX ADMIN — PIPERACILLIN AND TAZOBACTAM 3375 MG: 3; .375 INJECTION, POWDER, LYOPHILIZED, FOR SOLUTION INTRAVENOUS at 11:24

## 2025-03-28 RX ADMIN — ENOXAPARIN SODIUM 30 MG: 100 INJECTION SUBCUTANEOUS at 11:21

## 2025-03-28 RX ADMIN — Medication 5 MILLICURIE: at 10:14

## 2025-03-28 RX ADMIN — ACETAMINOPHEN 650 MG: 325 TABLET ORAL at 02:52

## 2025-03-28 RX ADMIN — ACETAMINOPHEN 650 MG: 325 TABLET ORAL at 11:21

## 2025-03-28 RX ADMIN — PIPERACILLIN AND TAZOBACTAM 3375 MG: 3; .375 INJECTION, POWDER, LYOPHILIZED, FOR SOLUTION INTRAVENOUS at 21:04

## 2025-03-28 ASSESSMENT — PAIN SCALES - GENERAL
PAINLEVEL_OUTOF10: 0
PAINLEVEL_OUTOF10: 3
PAINLEVEL_OUTOF10: 5

## 2025-03-28 ASSESSMENT — PAIN DESCRIPTION - LOCATION
LOCATION: ABDOMEN
LOCATION: ABDOMEN

## 2025-03-28 ASSESSMENT — PAIN DESCRIPTION - ORIENTATION
ORIENTATION: RIGHT
ORIENTATION: RIGHT

## 2025-03-28 ASSESSMENT — PAIN DESCRIPTION - DESCRIPTORS
DESCRIPTORS: ACHING
DESCRIPTORS: ACHING

## 2025-03-28 NOTE — PROGRESS NOTES
U/L    AST 16 15 - 37 U/L    Total Bilirubin <0.2 0.0 - 1.0 mg/dL    Bilirubin, Direct 0.2 0.0 - 0.3 mg/dL    Bilirubin, Indirect Can not be calculated 0.0 - 0.7 mg/dL    Total Protein 6.2 (L) 6.4 - 8.2 g/dL    Albumin/Globulin Ratio 1.1 1.1 - 2.2   POCT Glucose    Collection Time: 03/28/25  6:59 AM   Result Value Ref Range    POC Glucose 132 (H) 74 - 99 mg/dL        Imaging/Diagnostics Last 24 Hours   CT ABDOMEN PELVIS W IV CONTRAST Additional Contrast? None  Result Date: 3/25/2025  PROCEDURE: CT ABDOMEN PELVIS W IV CONTRAST DATE OF EXAM:  3/25/2025 17:21 DEMOGRAPHICS: 44 years old Female INDICATION: upper abdominal pain, recent cholecystectomy Contrast utilized and the relevant clinical information: IOPAMIDOL 76 % IV SOLN 80mL COMPARISON: No existing relevant imaging study corresponding to the same anatomical region is available. TECHNIQUE: Contiguous axial slices of the abdomen and pelvis were submitted after the IV administration of contrast. No oral contrast was utilized. Additional coronal reformatted images were submitted.  DOSE OPTIMIZATION: CT radiation dose optimization techniques (automated exposure  control, and use of iterative reconstruction techniques, or adjustment of the mA and/or kV according to patient size) were used to limit patient radiation dose. FINDINGS: Inferior chest: Trace amount of pleural fluid on the left mild dependent atelectasis or scarring favored in the lower lungs. Liver: The liver demonstrates normal appearance. No focal abnormalities are seen. Normal contrast enhancement is noted. Gallbladder:  Surgically absent. There is ill-defined fluid in the gallbladder fossa. Biliary tree: There is no evidence for intra-or extrahepatic biliary ductal dilatation. Spleen: Normal size and morphology is seen. No masses are identified. Pancreas: Normal morphology without masses or inflammatory changes. Adrenals: Normal size without masses. Kidneys: Normal size and morphology. No masses or  hydronephrosis. No stones are identified. Bowel: The stomach is normally distended with no focal wall abnormality. The duodenum is normal in caliber along its course. No focal abnormality is seen. The small bowel is normal caliber. There is no focal stricture or dilatation. The colon is normal in caliber. No focal abnormality is seen. The appendix is seen and is normal. Vasculature: No evidence of aneurysm or other significant vascular pathology. No  evidence of dissection. Lymphatic system: No pathologically enlarged lymph nodes are seen. Peritoneal structures: There is a moderate amount of scattered peritoneal fluid,  predominantly in the upper abdomen. Largest collection in right paracolic gutter demonstrates mild peripheral enhancement, measuring up to 9.0 x 5.2 cm on  axial image 61. Maximum craniocaudal dimension of this collection measures up to approximately 8.5 cm on coronal image 52. Collection in the anterior abdomen is not well defined measuring up to 4.2 x 3.3 cm. Collection extending to the left upper quadrant and again appears ill-defined measuring 6.9 x 2.0 cm on axial image 38. Mesenteric fat stranding is likely postoperative.  Retroperitoneum: No focal retroperitoneal abnormality is seen. Urinary bladder and pelvic structures: The urinary bladder is distended with a smooth thin wall.  Lobulated density along the right uterus, likely a fibroid, measuring up to approximately 4.1 cm. Abdominal wall:  Postoperative changes in the anterior abdominal wall. Bones: No significant osseous abnormalities. IMPRESSION: 1.  Postoperative changes from cholecystectomy. There is ill-defined fluid in the cholecystectomy bed with a moderate amount of scattered peritoneal fluid, most notable in the right paracolic gutter with mild peripheral enhancement suspicious for a developing abscess. Bile leak is also in the differential. 2.  Trace amount of pleural fluid in the left lung base with mild atelectasis or  scarring

## 2025-03-28 NOTE — PROGRESS NOTES
GENERAL SURGERY PROGRESS NOTE    Live Torres is a 44 y.o. female with recent laparoscopic cholecystectomy in [place] who presented with RUQ abdominal pain found to have intraabdominal fluid collections s/p IR drain placement.                 Subjective:  Pain is well controlled. She is feeling better. No nausea or vomiting. Having bowel movements. Tolerating a a regular diet.     Objective:    Vitals: VITALS:  /65   Pulse 69   Temp 98.8 °F (37.1 °C) (Oral)   Resp 16   Ht 1.626 m (5' 4\")   Wt 104.8 kg (231 lb)   SpO2 96%   BMI 39.65 kg/m²   TEMPERATURE:  Current - Temp: 98.8 °F (37.1 °C); Max - Temp  Av.4 °F (36.9 °C)  Min: 97.9 °F (36.6 °C)  Max: 98.8 °F (37.1 °C)    I/O:  0701 -  0700  In: 480 [P.O.:480]  Out: 25 [Drains:25]    Labs/Imaging Results:   Lab Results   Component Value Date    WBC 12.2 (H) 2025    HGB 10.7 (L) 2025    HCT 34.2 (L) 2025    MCV 88.4 2025     (H) 2025     Lab Results   Component Value Date     2025    K 4.0 2025     2025    CO2 23 2025    BUN 9 2025    CREATININE 0.5 (L) 2025    GLUCOSE 153 (H) 2025    CALCIUM 9.2 2025    BILITOT <0.2 2025    ALKPHOS 103 2025    AST 16 2025    ALT 29 2025    LABGLOM >90 2025    GFRAA >60 2021    AGRATIO 1.7 2024    GLOB 2.1 2021       IV Fluids:   sodium chloride    Scheduled Meds:   morphine, 2 mg, IntraVENous, Once    sodium chloride flush, 5-40 mL, IntraVENous, 2 times per day    enoxaparin, 30 mg, SubCUTAneous, BID    Atogepant, 60 mg, Oral, Daily    sertraline, 100 mg, Oral, Daily    piperacillin-tazobactam, 3,375 mg, IntraVENous, Q8H    Physical Exam:  General: A&O x 3, no distress, resting in bed comfortably   HEENT: Anicteric sclerae, MMM.  Extremities: No edema bilat LE.  Abdomen: Soft, obese, nondistended, appropriately tender to palpation around IR drain site, no rebound or

## 2025-03-29 VITALS
DIASTOLIC BLOOD PRESSURE: 68 MMHG | SYSTOLIC BLOOD PRESSURE: 108 MMHG | WEIGHT: 231.5 LBS | TEMPERATURE: 98.5 F | HEIGHT: 64 IN | RESPIRATION RATE: 16 BRPM | BODY MASS INDEX: 39.52 KG/M2 | HEART RATE: 84 BPM | OXYGEN SATURATION: 95 %

## 2025-03-29 LAB
ALBUMIN SERPL-MCNC: 3.5 G/DL (ref 3.4–5)
ALBUMIN/GLOB SERPL: 1.1 {RATIO} (ref 1.1–2.2)
ALP SERPL-CCNC: 116 U/L (ref 40–129)
ALT SERPL-CCNC: 31 U/L (ref 10–40)
AST SERPL-CCNC: 23 U/L (ref 15–37)
BILIRUB DIRECT SERPL-MCNC: <0.2 MG/DL (ref 0–0.3)
BILIRUB INDIRECT SERPL-MCNC: NORMAL MG/DL (ref 0–0.7)
BILIRUB SERPL-MCNC: <0.2 MG/DL (ref 0–1)
MICROORGANISM SPEC CULT: NORMAL
MICROORGANISM/AGENT SPEC: NORMAL
PROT SERPL-MCNC: 6.5 G/DL (ref 6.4–8.2)
SERVICE CMNT-IMP: NORMAL
SPECIMEN DESCRIPTION: NORMAL

## 2025-03-29 PROCEDURE — 36415 COLL VENOUS BLD VENIPUNCTURE: CPT

## 2025-03-29 PROCEDURE — 80076 HEPATIC FUNCTION PANEL: CPT

## 2025-03-29 PROCEDURE — 2580000003 HC RX 258: Performed by: STUDENT IN AN ORGANIZED HEALTH CARE EDUCATION/TRAINING PROGRAM

## 2025-03-29 PROCEDURE — 2500000003 HC RX 250 WO HCPCS: Performed by: STUDENT IN AN ORGANIZED HEALTH CARE EDUCATION/TRAINING PROGRAM

## 2025-03-29 PROCEDURE — 6370000000 HC RX 637 (ALT 250 FOR IP): Performed by: STUDENT IN AN ORGANIZED HEALTH CARE EDUCATION/TRAINING PROGRAM

## 2025-03-29 PROCEDURE — 99231 SBSQ HOSP IP/OBS SF/LOW 25: CPT | Performed by: SURGERY

## 2025-03-29 PROCEDURE — 94761 N-INVAS EAR/PLS OXIMETRY MLT: CPT

## 2025-03-29 PROCEDURE — 6360000002 HC RX W HCPCS: Performed by: STUDENT IN AN ORGANIZED HEALTH CARE EDUCATION/TRAINING PROGRAM

## 2025-03-29 RX ORDER — OXYCODONE AND ACETAMINOPHEN 5; 325 MG/1; MG/1
1 TABLET ORAL EVERY 6 HOURS PRN
Qty: 12 TABLET | Refills: 0 | Status: SHIPPED | OUTPATIENT
Start: 2025-03-29 | End: 2025-04-01

## 2025-03-29 RX ADMIN — PIPERACILLIN AND TAZOBACTAM 3375 MG: 3; .375 INJECTION, POWDER, LYOPHILIZED, FOR SOLUTION INTRAVENOUS at 05:03

## 2025-03-29 RX ADMIN — SODIUM CHLORIDE, PRESERVATIVE FREE 10 ML: 5 INJECTION INTRAVENOUS at 10:28

## 2025-03-29 RX ADMIN — ENOXAPARIN SODIUM 30 MG: 100 INJECTION SUBCUTANEOUS at 10:27

## 2025-03-29 RX ADMIN — SERTRALINE HYDROCHLORIDE 100 MG: 50 TABLET ORAL at 10:28

## 2025-03-29 NOTE — PLAN OF CARE
Problem: Discharge Planning  Goal: Discharge to home or other facility with appropriate resources  3/29/2025 1114 by Venessa Upton RN  Outcome: Progressing  3/29/2025 0140 by Kika Colon LPN  Outcome: Progressing     Problem: Chronic Conditions and Co-morbidities  Goal: Patient's chronic conditions and co-morbidity symptoms are monitored and maintained or improved  3/29/2025 1114 by Venessa Upton RN  Outcome: Progressing  3/29/2025 0140 by Kika Colon LPN  Outcome: Progressing     Problem: Pain  Goal: Verbalizes/displays adequate comfort level or baseline comfort level  3/29/2025 1114 by Venessa Upton RN  Outcome: Progressing  3/29/2025 0140 by Kika Colon LPN  Outcome: Progressing     Problem: Skin/Tissue Integrity - Adult  Goal: Skin integrity remains intact  3/29/2025 1114 by Venessa Upton RN  Outcome: Progressing  3/29/2025 0140 by Kika Colon LPN  Outcome: Progressing  Goal: Incisions, wounds, or drain sites healing without S/S of infection  3/29/2025 1114 by Venessa Upton RN  Outcome: Progressing  3/29/2025 0140 by Kika Colon LPN  Outcome: Progressing  Goal: Oral mucous membranes remain intact  3/29/2025 1114 by Venessa Upton RN  Outcome: Progressing  3/29/2025 0140 by Kika Colon LPN  Outcome: Progressing     Problem: Gastrointestinal - Adult  Goal: Minimal or absence of nausea and vomiting  3/29/2025 1114 by Venessa Upton RN  Outcome: Progressing  3/29/2025 0140 by Kika Colon LPN  Outcome: Progressing  Goal: Maintains adequate nutritional intake  3/29/2025 1114 by Venessa Upton RN  Outcome: Progressing  3/29/2025 0140 by Kika Colon LPN  Outcome: Progressing

## 2025-03-29 NOTE — DISCHARGE SUMMARY
V2.0  Discharge Summary    Name:  Live Torres /Age/Sex: 1980 (44 y.o. female)   Admit Date: 3/25/2025  Discharge Date: 3/29/25    MRN & CSN:  [id number] & [id number] Encounter Date and Time 3/29/25 12:10 PM EDT    Attending:  Zaina Holley MD Discharging Provider: Zaina Holley MD       Hospital Course:     Brief HPI: Live Torres is a 44 y.o. female with pmh of MDD, migraine disorder who presents with abdominal pain ongoing since her surgery and occasional chills. Patient was in [place] for her sons event and was planning to return on last Monday when she had abdominal pain and was admitted to [facility] in [place] was diagnosed and treated for gallstone pancreatitis and cholecystitis was discharged from there on Saturday came back to [place] on  since then she continues to have abdominal pain, and chills saw her PCP who recommended she goes to the ER     Brief Problem Based Course:   Suspected intraabdominal abscess  - likely as a consequence of recent laparoscopic cholecystitis, surgical note reviewed from [facility], patient did have a complete gallbladder resection  -CT abdomen with fluid collection in R paracolic gutter, General surgery consult  -intraabdominal drain placement which is now removed  -HIDA scan did not show any bile leak  -cultures negative so zosyn will be switched to augmentin      Recent episode of acute pancreatitis 2/2 gallstones  - lipase mildly elevated, CT abdomen with normal pancreas, CTM     Mood disorder  - continue home meds      The patient expressed appropriate understanding of, and agreement with the discharge recommendations, medications, and plan.     Consults this admission:  IP CONSULT TO GENERAL SURGERY  IP CONSULT TO VASCULAR ACCESS TEAM  IP CONSULT TO INTERVENTIONAL RADIOLOGY    Discharge Diagnosis:   Intra-abdominal abscess (HCC)      Discharge Instruction:   Follow up appointments:    Primary care physician: Caren  masses or inflammatory changes. Adrenals: Normal size without masses. Kidneys: Normal size and morphology. No masses or hydronephrosis. No stones are identified. Bowel: The stomach is normally distended with no focal wall abnormality. The duodenum is normal in caliber along its course. No focal abnormality is seen. The small bowel is normal caliber. There is no focal stricture or dilatation. The colon is normal in caliber. No focal abnormality is seen. The appendix is seen and is normal. Vasculature: No evidence of aneurysm or other significant vascular pathology. No  evidence of dissection. Lymphatic system: No pathologically enlarged lymph nodes are seen. Peritoneal structures: There is a moderate amount of scattered peritoneal fluid,  predominantly in the upper abdomen. Largest collection in right paracolic gutter demonstrates mild peripheral enhancement, measuring up to 9.0 x 5.2 cm on  axial image 61. Maximum craniocaudal dimension of this collection measures up to approximately 8.5 cm on coronal image 52. Collection in the anterior abdomen is not well defined measuring up to 4.2 x 3.3 cm. Collection extending to the left upper quadrant and again appears ill-defined measuring 6.9 x 2.0 cm on axial image 38. Mesenteric fat stranding is likely postoperative.  Retroperitoneum: No focal retroperitoneal abnormality is seen. Urinary bladder and pelvic structures: The urinary bladder is distended with a smooth thin wall.  Lobulated density along the right uterus, likely a fibroid, measuring up to approximately 4.1 cm. Abdominal wall:  Postoperative changes in the anterior abdominal wall. Bones: No significant osseous abnormalities. IMPRESSION: 1.  Postoperative changes from cholecystectomy. There is ill-defined fluid in the cholecystectomy bed with a moderate amount of scattered peritoneal fluid, most notable in the right paracolic gutter with mild peripheral enhancement suspicious for a developing abscess. Bile leak

## 2025-03-29 NOTE — PLAN OF CARE
Problem: Chronic Conditions and Co-morbidities  Goal: Patient's chronic conditions and co-morbidity symptoms are monitored and maintained or improved  Outcome: Progressing     Problem: Discharge Planning  Goal: Discharge to home or other facility with appropriate resources  Outcome: Progressing     Problem: Pain  Goal: Verbalizes/displays adequate comfort level or baseline comfort level  Outcome: Progressing     Problem: Skin/Tissue Integrity - Adult  Goal: Skin integrity remains intact  Outcome: Progressing  Goal: Incisions, wounds, or drain sites healing without S/S of infection  Outcome: Progressing  Goal: Oral mucous membranes remain intact  Outcome: Progressing     Problem: Gastrointestinal - Adult  Goal: Minimal or absence of nausea and vomiting  Outcome: Progressing  Goal: Maintains adequate nutritional intake  Outcome: Progressing

## 2025-03-29 NOTE — PROGRESS NOTES
GENERAL SURGERY PROGRESS NOTE    Live Torres is a 44 y.o. female with IR drain for fluid collection post op from laparoscopic cholecystectomy in [place].                 Subjective:  Patient seen as she was requesting drain removal. She reports feeling well. Pain improved since admission. Drain with minimal clear output    Objective:    Vitals: VITALS:  /68   Pulse 84   Temp 98.5 °F (36.9 °C) (Oral)   Resp 16   Ht 1.626 m (5' 4\")   Wt 105 kg (231 lb 8 oz)   SpO2 95%   BMI 39.74 kg/m²     I/O: 03/28 0701 - 03/29 0700  In: 1050 [P.O.:1000]  Out: 40 [Drains:40]    Labs/Imaging Results:   Lab Results   Component Value Date/Time     03/27/2025 08:35 AM    K 4.0 03/27/2025 08:35 AM     03/27/2025 08:35 AM    CO2 23 03/27/2025 08:35 AM    BUN 9 03/27/2025 08:35 AM    CREATININE 0.5 03/27/2025 08:35 AM    GLUCOSE 153 03/27/2025 08:35 AM    CALCIUM 9.2 03/27/2025 08:35 AM      Lab Results   Component Value Date    WBC 12.2 (H) 03/27/2025    HGB 10.7 (L) 03/27/2025    HCT 34.2 (L) 03/27/2025    MCV 88.4 03/27/2025     (H) 03/27/2025       IV Fluids:   sodium chloride    Scheduled Meds:   morphine, 2 mg, IntraVENous, Once    sodium chloride flush, 5-40 mL, IntraVENous, 2 times per day    enoxaparin, 30 mg, SubCUTAneous, BID    Atogepant, 60 mg, Oral, Daily    sertraline, 100 mg, Oral, Daily    piperacillin-tazobactam, 3,375 mg, IntraVENous, Q8H    Physical Exam:  General: A&O x 3, no distress.   HEENT: Anicteric sclerae, MMM.  Extremities: No edema bilat LE.  Abdomen: Soft, minimally tender, VIKRAM in place    VIKRAM- 40cc of serous/clear output--has been flushed with NS      Assessment and Plan:  44 y.o. female with abdominal IR drain after laparoscopic cholecystectomy at outside hospital.    Patient Active Problem List:     Obesity, unspecified     Tobacco use     Headache     Other seasonal allergic rhinitis     Unspecified hemorrhoids     Vaginal high risk human papillomavirus (HPV) DNA test  positive     Conductive hearing loss, unilateral, left ear, with unrestricted hearing on the contralateral side     Conductive hearing loss, unilateral, right ear, with unrestricted hearing on the contralateral side     Encounter for drug screening     Anxiety     Impacted cerumen of right ear     Generalized abdominal pain     Moderate episode of recurrent major depressive disorder (HCC)     RC (generalized anxiety disorder)     Intra-abdominal abscess (HCC)      - drain was cut and removed  - ok for d/c from a surgery perspective once abx plan in place    Kodak Solorio MD

## 2025-03-30 LAB
MICROORGANISM SPEC CULT: NORMAL
SERVICE CMNT-IMP: NORMAL
SPECIMEN DESCRIPTION: NORMAL

## 2025-03-31 ENCOUNTER — TELEPHONE (OUTPATIENT)
Age: 45
End: 2025-03-31

## 2025-03-31 ENCOUNTER — RESULTS FOLLOW-UP (OUTPATIENT)
Dept: PHARMACY | Age: 45
End: 2025-03-31

## 2025-03-31 LAB
MICROORGANISM SPEC CULT: NORMAL
SERVICE CMNT-IMP: NORMAL
SPECIMEN DESCRIPTION: NORMAL

## 2025-03-31 NOTE — TELEPHONE ENCOUNTER
Care Transitions Initial Follow Up Call    Outreach made within 2 business days of discharge: Yes    Patient: Live Torres Patient : 1980   MRN: [id number]  Reason for Admission: abdominal abscess   Discharge Date: 3/29/25       Spoke with: Joann     Discharge department/facility:  [facility] surgical     TCM Interactive Patient Contact:  Was patient able to fill all prescriptions: Yes  Was patient instructed to bring all medications to the follow-up visit: Yes  Is patient taking all medications as directed in the discharge summary? Yes  Does patient understand their discharge instructions: Yes  Does patient have questions or concerns that need addressed prior to 7-14 day follow up office visit: yes - 2025 at 7:40 am     Additional needs identified to be addressed with provider               Scheduled appointment with PCP within 7-14 days    Follow Up  No future appointments.    Luisito Santiago LPN

## 2025-04-07 DIAGNOSIS — R51.9 CHRONIC NONINTRACTABLE HEADACHE, UNSPECIFIED HEADACHE TYPE: ICD-10-CM

## 2025-04-07 DIAGNOSIS — G89.29 CHRONIC NONINTRACTABLE HEADACHE, UNSPECIFIED HEADACHE TYPE: ICD-10-CM

## 2025-04-07 NOTE — TELEPHONE ENCOUNTER
Last ov 10/22/24, next ov due 10/2025. Rx pending.     Requested Prescriptions     Pending Prescriptions Disp Refills    butalbital-acetaminophen-caffeine (FIORICET, ESGIC) -40 MG per tablet [Pharmacy Med Name: [id number] -40] 15 tablet      Sig: TAKE 1 TABLET BY MOUTH EVERY 6 HOURS AS NEEDED FOR MIGRAINE. MAX OF 4 PER DAY, NO MORE THAN 15 TABLETS PER MONTH

## 2025-04-08 ENCOUNTER — OFFICE VISIT (OUTPATIENT)
Dept: SURGERY | Age: 45
End: 2025-04-08
Payer: COMMERCIAL

## 2025-04-08 VITALS
WEIGHT: 231 LBS | BODY MASS INDEX: 39.44 KG/M2 | HEIGHT: 64 IN | OXYGEN SATURATION: 98 % | HEART RATE: 88 BPM | SYSTOLIC BLOOD PRESSURE: 132 MMHG | DIASTOLIC BLOOD PRESSURE: 84 MMHG

## 2025-04-08 DIAGNOSIS — G89.29 CHRONIC NONINTRACTABLE HEADACHE, UNSPECIFIED HEADACHE TYPE: ICD-10-CM

## 2025-04-08 DIAGNOSIS — R51.9 CHRONIC NONINTRACTABLE HEADACHE, UNSPECIFIED HEADACHE TYPE: ICD-10-CM

## 2025-04-08 DIAGNOSIS — R18.8 ABDOMINAL FLUID COLLECTION: ICD-10-CM

## 2025-04-08 DIAGNOSIS — Z90.49 S/P LAPAROSCOPIC CHOLECYSTECTOMY: Primary | ICD-10-CM

## 2025-04-08 PROCEDURE — 99212 OFFICE O/P EST SF 10 MIN: CPT | Performed by: SURGERY

## 2025-04-08 RX ORDER — BUTALBITAL, ACETAMINOPHEN AND CAFFEINE 50; 325; 40 MG/1; MG/1; MG/1
TABLET ORAL
Qty: 15 TABLET | Refills: 0 | Status: SHIPPED | OUTPATIENT
Start: 2025-04-08

## 2025-04-08 RX ORDER — BUTALBITAL, ACETAMINOPHEN AND CAFFEINE 50; 325; 40 MG/1; MG/1; MG/1
TABLET ORAL
Qty: 15 TABLET | Refills: 0 | OUTPATIENT
Start: 2025-04-08

## 2025-04-08 ASSESSMENT — PATIENT HEALTH QUESTIONNAIRE - PHQ9
SUM OF ALL RESPONSES TO PHQ QUESTIONS 1-9: 0
SUM OF ALL RESPONSES TO PHQ QUESTIONS 1-9: 0
1. LITTLE INTEREST OR PLEASURE IN DOING THINGS: NOT AT ALL
SUM OF ALL RESPONSES TO PHQ QUESTIONS 1-9: 0
SUM OF ALL RESPONSES TO PHQ QUESTIONS 1-9: 0
2. FEELING DOWN, DEPRESSED OR HOPELESS: NOT AT ALL

## 2025-04-08 NOTE — PROGRESS NOTES
Exam  General: awake, alert, in no acute distress  HEENT: mucous membranes moist  Respiratory: normal effort, no wheezes appreciated  CV: appears well perfused    Abdomen: Soft, mildly tender at 2 central laparoscopic incision sites.  Incisions are well-healed.  There is some mildly prominent scar tissue palpable beneath the laparoscopic port sites.  No impulse with Valsalva to suggest port site hernia.    Skin: warm and dry  Extremities: atraumatic  Neuro: no focal deficits noted  Psych: mood normal        ASSESSMENT:  1. S/P laparoscopic cholecystectomy    2. Abdominal fluid collection      Joann seems to be recovering appropriately after lap vladislav and intra-abdominal fluid collection related to pancreatitis treated initially in [place].  Given her complicated course I feel having some residual symptoms this far out it is probably normal.  I do not appreciate any clinical evidence of further complication.    PLAN:  Treatment:    Can use some ibuprofen and start a gentle stretching routine to help with postop soreness.  Happy to see back if I can be of further assistance        Patient counseled on risks, benefits, and alternatives of treatment plan at length today. Patient states an understanding and willingness to proceed with plan.        Kodak Solorio MD

## 2025-04-09 ENCOUNTER — OFFICE VISIT (OUTPATIENT)
Age: 45
End: 2025-04-09
Payer: COMMERCIAL

## 2025-04-09 VITALS
WEIGHT: 236.4 LBS | OXYGEN SATURATION: 98 % | BODY MASS INDEX: 40.36 KG/M2 | HEART RATE: 84 BPM | DIASTOLIC BLOOD PRESSURE: 72 MMHG | HEIGHT: 64 IN | SYSTOLIC BLOOD PRESSURE: 128 MMHG

## 2025-04-09 DIAGNOSIS — Z09 HOSPITAL DISCHARGE FOLLOW-UP: Primary | ICD-10-CM

## 2025-04-09 PROCEDURE — 99213 OFFICE O/P EST LOW 20 MIN: CPT

## 2025-04-09 PROCEDURE — 1111F DSCHRG MED/CURRENT MED MERGE: CPT

## 2025-04-09 ASSESSMENT — PATIENT HEALTH QUESTIONNAIRE - PHQ9
4. FEELING TIRED OR HAVING LITTLE ENERGY: NOT AT ALL
5. POOR APPETITE OR OVEREATING: NOT AT ALL
10. IF YOU CHECKED OFF ANY PROBLEMS, HOW DIFFICULT HAVE THESE PROBLEMS MADE IT FOR YOU TO DO YOUR WORK, TAKE CARE OF THINGS AT HOME, OR GET ALONG WITH OTHER PEOPLE: NOT DIFFICULT AT ALL
SUM OF ALL RESPONSES TO PHQ QUESTIONS 1-9: 0
6. FEELING BAD ABOUT YOURSELF - OR THAT YOU ARE A FAILURE OR HAVE LET YOURSELF OR YOUR FAMILY DOWN: NOT AT ALL
2. FEELING DOWN, DEPRESSED OR HOPELESS: NOT AT ALL
1. LITTLE INTEREST OR PLEASURE IN DOING THINGS: NOT AT ALL
SUM OF ALL RESPONSES TO PHQ QUESTIONS 1-9: 0
9. THOUGHTS THAT YOU WOULD BE BETTER OFF DEAD, OR OF HURTING YOURSELF: NOT AT ALL
7. TROUBLE CONCENTRATING ON THINGS, SUCH AS READING THE NEWSPAPER OR WATCHING TELEVISION: NOT AT ALL
SUM OF ALL RESPONSES TO PHQ QUESTIONS 1-9: 0
8. MOVING OR SPEAKING SO SLOWLY THAT OTHER PEOPLE COULD HAVE NOTICED. OR THE OPPOSITE, BEING SO FIGETY OR RESTLESS THAT YOU HAVE BEEN MOVING AROUND A LOT MORE THAN USUAL: NOT AT ALL
SUM OF ALL RESPONSES TO PHQ QUESTIONS 1-9: 0
3. TROUBLE FALLING OR STAYING ASLEEP: NOT AT ALL

## 2025-04-09 NOTE — PATIENT INSTRUCTIONS
We are committed to providing you the best care possible.    If you receive a survey after visiting one of our offices, please take time to share your experience concerning your physician office visit.  These surveys are confidential and no health information about you is shared.    We are eager to improve for you and continue to give you satisfactory care, we are counting on your feedback to help make that happen.            Welcome to [facility]:    Did you know we now have a faster way for you to move through your appointment? For your convenience, we now have digital registration available. When you schedule your next appointment, you will receive a link via your email as well as a text message that will allow you to complete any paperwork digitally before your appointment.

## 2025-04-09 NOTE — PROGRESS NOTES
Post-Discharge Transitional Care  Follow Up      Live Torres   YOB: 1980    Date of Office Visit:  4/9/2025  Date of Hospital Admission: 3/25/25  Date of Hospital Discharge: 3/29/25  Risk of hospital readmission (high >=14%. Medium >=10%) :Readmission Risk Score: 7.8      Care management risk score Rising risk (score 2-5) and Complex Care (Scores >=6): No Risk Score On File     Non face to face  following discharge, date last encounter closed (first attempt may have been earlier): 03/31/2025    Call initiated 2 business days of discharge: Yes    ASSESSMENT/PLAN:   Hospital discharge follow-up  -     NM DISCHARGE MEDS RECONCILED W/ CURRENT OUTPATIENT MED LIST    Medical Decision Making: moderate complexity  Return in about 3 months (around 7/9/2025).           Subjective:   HPI:  Follow up of Hospital problems/diagnosis(es): Intra-abdominal abscess    Inpatient course: Discharge summary reviewed- see chart.    Interval history/Current status:   History of Present Illness  The patient is a 45-year-old female here for a hospital follow-up.    She was recently admitted to the hospital due to an abdominal abscess and infection, which required the placement of a drain tube. During her hospital stay, she received extensive intravenous antibiotics and fluids. She was discharged on Saturday. She had a consultation with Dr. Solorio yesterday, who informed her that her recovery from pancreatitis, gallbladder issues, and the infection would be gradual, potentially spanning 6 to 9 months. She continues to experience bloating due to gas accumulation. She reports soreness at three sites where she initially suspected hernia formation, but it was confirmed to be scar tissue. Despite some residual pain, she is making progress in her recovery. She has been advised to gradually increase her activity level. She finds relief from pain by lying on her side, a position she previously found too painful. She is

## 2025-04-15 DIAGNOSIS — G89.18 POST-OP PAIN: Primary | ICD-10-CM

## 2025-04-23 ASSESSMENT — ENCOUNTER SYMPTOMS
EYES NEGATIVE: 1
VOMITING: 0
ABDOMINAL DISTENTION: 1
DIARRHEA: 0
ABDOMINAL PAIN: 1
RESPIRATORY NEGATIVE: 1
NAUSEA: 0

## 2025-04-28 DIAGNOSIS — G89.29 CHRONIC NONINTRACTABLE HEADACHE, UNSPECIFIED HEADACHE TYPE: ICD-10-CM

## 2025-04-28 DIAGNOSIS — R51.9 CHRONIC NONINTRACTABLE HEADACHE, UNSPECIFIED HEADACHE TYPE: ICD-10-CM

## 2025-04-28 NOTE — TELEPHONE ENCOUNTER
Patient's pharmacy requesting refills on fioricet, last seen 10/22/2024 and is expected to be seen for f/u on 10/22/2025.

## 2025-04-29 RX ORDER — BUTALBITAL, ACETAMINOPHEN AND CAFFEINE 50; 325; 40 MG/1; MG/1; MG/1
TABLET ORAL
Qty: 15 TABLET | Refills: 0 | Status: SHIPPED | OUTPATIENT
Start: 2025-04-29

## 2025-04-30 ENCOUNTER — HOSPITAL ENCOUNTER (OUTPATIENT)
Dept: CT IMAGING | Age: 45
Discharge: HOME | End: 2025-04-30
Attending: SURGERY
Payer: COMMERCIAL

## 2025-04-30 DIAGNOSIS — G89.18 POST-OP PAIN: ICD-10-CM

## 2025-04-30 PROCEDURE — 6360000004 HC RX CONTRAST MEDICATION: Performed by: SURGERY

## 2025-04-30 PROCEDURE — 74177 CT ABD & PELVIS W/CONTRAST: CPT

## 2025-04-30 RX ORDER — IOPAMIDOL 755 MG/ML
100 INJECTION, SOLUTION INTRAVASCULAR
Status: COMPLETED | OUTPATIENT
Start: 2025-04-30 | End: 2025-04-30

## 2025-04-30 RX ADMIN — IOPAMIDOL 100 ML: 755 INJECTION, SOLUTION INTRAVENOUS at 07:22

## 2025-05-01 ENCOUNTER — TELEPHONE (OUTPATIENT)
Dept: WOUND CARE | Age: 45
End: 2025-05-01

## 2025-05-01 NOTE — TELEPHONE ENCOUNTER
I called Joann and let her know I reviewed her CT and I see no post op problems or reason for her ongoing symptoms.     Electronically signed by Kodak Solorio MD on 5/1/2025 at 3:41 PM

## 2025-05-21 ENCOUNTER — OFFICE VISIT (OUTPATIENT)
Age: 45
End: 2025-05-21
Payer: COMMERCIAL

## 2025-05-21 VITALS
SYSTOLIC BLOOD PRESSURE: 124 MMHG | HEIGHT: 64 IN | HEART RATE: 100 BPM | BODY MASS INDEX: 40.36 KG/M2 | WEIGHT: 236.4 LBS | RESPIRATION RATE: 16 BRPM | DIASTOLIC BLOOD PRESSURE: 80 MMHG | TEMPERATURE: 98.1 F | OXYGEN SATURATION: 97 %

## 2025-05-21 DIAGNOSIS — R05.1 ACUTE COUGH: Primary | ICD-10-CM

## 2025-05-21 DIAGNOSIS — F33.1 MODERATE EPISODE OF RECURRENT MAJOR DEPRESSIVE DISORDER (HCC): ICD-10-CM

## 2025-05-21 DIAGNOSIS — F41.1 GAD (GENERALIZED ANXIETY DISORDER): ICD-10-CM

## 2025-05-21 DIAGNOSIS — G89.29 CHRONIC NONINTRACTABLE HEADACHE, UNSPECIFIED HEADACHE TYPE: ICD-10-CM

## 2025-05-21 DIAGNOSIS — J06.9 VIRAL URI: ICD-10-CM

## 2025-05-21 DIAGNOSIS — R51.9 CHRONIC NONINTRACTABLE HEADACHE, UNSPECIFIED HEADACHE TYPE: ICD-10-CM

## 2025-05-21 DIAGNOSIS — R09.81 SINUS CONGESTION: ICD-10-CM

## 2025-05-21 LAB
INFLUENZA VIRUS A RNA: NEGATIVE
INFLUENZA VIRUS B RNA: NEGATIVE
Lab: NORMAL
QC PASS/FAIL: NORMAL
SARS-COV-2 RDRP RESP QL NAA+PROBE: NEGATIVE

## 2025-05-21 PROCEDURE — 87635 SARS-COV-2 COVID-19 AMP PRB: CPT

## 2025-05-21 PROCEDURE — 99214 OFFICE O/P EST MOD 30 MIN: CPT

## 2025-05-21 PROCEDURE — 87502 INFLUENZA DNA AMP PROBE: CPT

## 2025-05-21 RX ORDER — IBUPROFEN 600 MG/1
600 TABLET, FILM COATED ORAL EVERY 6 HOURS PRN
Qty: 360 TABLET | Refills: 3 | Status: SHIPPED | OUTPATIENT
Start: 2025-05-21

## 2025-05-21 RX ORDER — FLUTICASONE PROPIONATE 50 MCG
2 SPRAY, SUSPENSION (ML) NASAL DAILY
Qty: 16 G | Refills: 0 | Status: SHIPPED | OUTPATIENT
Start: 2025-05-21

## 2025-05-21 RX ORDER — SERTRALINE HYDROCHLORIDE 100 MG/1
100 TABLET, FILM COATED ORAL DAILY
Qty: 90 TABLET | Refills: 3 | Status: SHIPPED | OUTPATIENT
Start: 2025-05-21

## 2025-05-21 ASSESSMENT — ENCOUNTER SYMPTOMS
GASTROINTESTINAL NEGATIVE: 1
RHINORRHEA: 1
DIARRHEA: 0
VOMITING: 0
SINUS PRESSURE: 1
NAUSEA: 0
SORE THROAT: 0
RESPIRATORY NEGATIVE: 1

## 2025-05-21 ASSESSMENT — PATIENT HEALTH QUESTIONNAIRE - PHQ9
SUM OF ALL RESPONSES TO PHQ QUESTIONS 1-9: 0
8. MOVING OR SPEAKING SO SLOWLY THAT OTHER PEOPLE COULD HAVE NOTICED. OR THE OPPOSITE, BEING SO FIGETY OR RESTLESS THAT YOU HAVE BEEN MOVING AROUND A LOT MORE THAN USUAL: NOT AT ALL
1. LITTLE INTEREST OR PLEASURE IN DOING THINGS: NOT AT ALL
2. FEELING DOWN, DEPRESSED OR HOPELESS: NOT AT ALL
4. FEELING TIRED OR HAVING LITTLE ENERGY: NOT AT ALL
9. THOUGHTS THAT YOU WOULD BE BETTER OFF DEAD, OR OF HURTING YOURSELF: NOT AT ALL
10. IF YOU CHECKED OFF ANY PROBLEMS, HOW DIFFICULT HAVE THESE PROBLEMS MADE IT FOR YOU TO DO YOUR WORK, TAKE CARE OF THINGS AT HOME, OR GET ALONG WITH OTHER PEOPLE: NOT DIFFICULT AT ALL
SUM OF ALL RESPONSES TO PHQ QUESTIONS 1-9: 0
7. TROUBLE CONCENTRATING ON THINGS, SUCH AS READING THE NEWSPAPER OR WATCHING TELEVISION: NOT AT ALL
6. FEELING BAD ABOUT YOURSELF - OR THAT YOU ARE A FAILURE OR HAVE LET YOURSELF OR YOUR FAMILY DOWN: NOT AT ALL
5. POOR APPETITE OR OVEREATING: NOT AT ALL
3. TROUBLE FALLING OR STAYING ASLEEP: NOT AT ALL

## 2025-05-21 NOTE — PATIENT INSTRUCTIONS
We are committed to providing you the best care possible.    If you receive a survey after visiting one of our offices, please take time to share your experience concerning your physician office visit.  These surveys are confidential and no health information about you is shared.    We are eager to improve for you and continue to give you satisfactory care, we are counting on your feedback to help make that happen.            Welcome to [facility]:    Did you know we now have a faster way for you to move through your appointment? For your convenience, we now have digital registration available. When you schedule your next appointment, you will receive a link via your email as well as a text message that will allow you to complete any paperwork digitally before your appointment.

## 2025-05-21 NOTE — PROGRESS NOTES
posterior oropharyngeal erythema or uvula swelling.   Cardiovascular:      Rate and Rhythm: Normal rate and regular rhythm.      Pulses: Normal pulses.      Heart sounds: Normal heart sounds. No murmur heard.     No friction rub. No gallop.   Pulmonary:      Effort: Pulmonary effort is normal. No respiratory distress.      Breath sounds: Normal breath sounds. No stridor. No wheezing, rhonchi or rales.   Chest:      Chest wall: No tenderness.   Lymphadenopathy:      Head:      Right side of head: No submental, submandibular, tonsillar, preauricular, posterior auricular or occipital adenopathy.      Left side of head: No submental, submandibular, tonsillar, preauricular, posterior auricular or occipital adenopathy.      Cervical: No cervical adenopathy.   Skin:     General: Skin is warm.      Comments: Waxy tan/ pink SK noted to left chest. No drainage or tenderness present.    Neurological:      General: No focal deficit present.      Mental Status: She is alert and oriented to person, place, and time.   Psychiatric:         Mood and Affect: Mood normal.         Behavior: Behavior normal.            Assessment & Plan   1. Acute cough  - POCT COVID-19 Rapid, NAAT  - POCT Influenza A/B DNA (Alere i)  - Covid and flu negative    2. Sinus congestion  - POCT COVID-19 Rapid, NAAT  - POCT Influenza A/B DNA (Alere i)    3. Chronic nonintractable headache, unspecified headache type  - ibuprofen (ADVIL;MOTRIN) 600 MG tablet; Take 1 tablet by mouth every 6 hours as needed for Pain  Dispense: 360 tablet; Refill: 3  - Stable. Continue current medication regimen.     4. Moderate episode of recurrent major depressive disorder (HCC)  - sertraline (ZOLOFT) 100 MG tablet; Take 1 tablet by mouth daily  Dispense: 90 tablet; Refill: 3  - Stable. Continue current medication regimen.     5. RC (generalized anxiety disorder)  - sertraline (ZOLOFT) 100 MG tablet; Take 1 tablet by mouth daily  Dispense: 90 tablet; Refill: 3  - Stable.

## 2025-05-23 DIAGNOSIS — G89.29 CHRONIC NONINTRACTABLE HEADACHE, UNSPECIFIED HEADACHE TYPE: ICD-10-CM

## 2025-05-23 DIAGNOSIS — R51.9 CHRONIC NONINTRACTABLE HEADACHE, UNSPECIFIED HEADACHE TYPE: ICD-10-CM

## 2025-05-27 RX ORDER — BUTALBITAL, ACETAMINOPHEN AND CAFFEINE 50; 325; 40 MG/1; MG/1; MG/1
TABLET ORAL
Qty: 15 TABLET | Refills: 0 | Status: SHIPPED | OUTPATIENT
Start: 2025-05-27

## 2025-06-10 DIAGNOSIS — F41.1 GAD (GENERALIZED ANXIETY DISORDER): ICD-10-CM

## 2025-06-10 RX ORDER — ALPRAZOLAM 1 MG/1
1 TABLET ORAL 2 TIMES DAILY PRN
Qty: 60 TABLET | OUTPATIENT
Start: 2025-06-10 | End: 2025-07-10

## 2025-06-11 ENCOUNTER — PATIENT MESSAGE (OUTPATIENT)
Age: 45
End: 2025-06-11

## 2025-06-11 DIAGNOSIS — F41.1 GAD (GENERALIZED ANXIETY DISORDER): ICD-10-CM

## 2025-06-11 RX ORDER — ALPRAZOLAM 1 MG/1
1 TABLET ORAL 2 TIMES DAILY
Qty: 60 TABLET | Refills: 0 | Status: SHIPPED | OUTPATIENT
Start: 2025-06-11 | End: 2025-07-11

## 2025-06-11 NOTE — TELEPHONE ENCOUNTER
Patient requesting refill of Xanax today.  Patient last seen on 5/21/2025 and anxiety was addressed.  OARRS report checked today.  Will send in 30-day supply.  Patient will need to come in for visit for more refills.

## 2025-06-18 DIAGNOSIS — J06.9 VIRAL URI: ICD-10-CM

## 2025-06-19 RX ORDER — FLUTICASONE PROPIONATE 50 MCG
2 SPRAY, SUSPENSION (ML) NASAL DAILY
Qty: 48 ML | Refills: 1 | Status: SHIPPED | OUTPATIENT
Start: 2025-06-19

## 2025-07-01 DIAGNOSIS — G89.29 CHRONIC NONINTRACTABLE HEADACHE, UNSPECIFIED HEADACHE TYPE: ICD-10-CM

## 2025-07-01 DIAGNOSIS — R51.9 CHRONIC NONINTRACTABLE HEADACHE, UNSPECIFIED HEADACHE TYPE: ICD-10-CM

## 2025-07-02 ENCOUNTER — PATIENT MESSAGE (OUTPATIENT)
Age: 45
End: 2025-07-02

## 2025-07-02 DIAGNOSIS — G89.29 CHRONIC NONINTRACTABLE HEADACHE, UNSPECIFIED HEADACHE TYPE: ICD-10-CM

## 2025-07-02 DIAGNOSIS — R51.9 CHRONIC NONINTRACTABLE HEADACHE, UNSPECIFIED HEADACHE TYPE: ICD-10-CM

## 2025-07-03 DIAGNOSIS — R51.9 CHRONIC NONINTRACTABLE HEADACHE, UNSPECIFIED HEADACHE TYPE: ICD-10-CM

## 2025-07-03 DIAGNOSIS — G89.29 CHRONIC NONINTRACTABLE HEADACHE, UNSPECIFIED HEADACHE TYPE: ICD-10-CM

## 2025-07-03 RX ORDER — BUTALBITAL, ACETAMINOPHEN AND CAFFEINE 50; 325; 40 MG/1; MG/1; MG/1
TABLET ORAL
Qty: 15 TABLET | Refills: 0 | OUTPATIENT
Start: 2025-07-03

## 2025-07-07 RX ORDER — BUTALBITAL, ACETAMINOPHEN AND CAFFEINE 50; 325; 40 MG/1; MG/1; MG/1
TABLET ORAL
Qty: 15 TABLET | Refills: 0 | OUTPATIENT
Start: 2025-07-07

## 2025-07-07 RX ORDER — BUTALBITAL, ACETAMINOPHEN AND CAFFEINE 50; 325; 40 MG/1; MG/1; MG/1
TABLET ORAL
Qty: 15 TABLET | Refills: 3 | Status: SHIPPED | OUTPATIENT
Start: 2025-07-07

## 2025-07-09 ENCOUNTER — OFFICE VISIT (OUTPATIENT)
Age: 45
End: 2025-07-09
Payer: COMMERCIAL

## 2025-07-09 VITALS
DIASTOLIC BLOOD PRESSURE: 74 MMHG | BODY MASS INDEX: 40.97 KG/M2 | HEART RATE: 84 BPM | HEIGHT: 64 IN | RESPIRATION RATE: 16 BRPM | OXYGEN SATURATION: 97 % | WEIGHT: 240 LBS | SYSTOLIC BLOOD PRESSURE: 126 MMHG

## 2025-07-09 DIAGNOSIS — F33.1 MODERATE EPISODE OF RECURRENT MAJOR DEPRESSIVE DISORDER (HCC): ICD-10-CM

## 2025-07-09 DIAGNOSIS — Z12.31 ENCOUNTER FOR SCREENING MAMMOGRAM FOR BREAST CANCER: Primary | ICD-10-CM

## 2025-07-09 DIAGNOSIS — F41.1 GAD (GENERALIZED ANXIETY DISORDER): ICD-10-CM

## 2025-07-09 PROCEDURE — 99214 OFFICE O/P EST MOD 30 MIN: CPT

## 2025-07-09 RX ORDER — ALPRAZOLAM 1 MG/1
1 TABLET ORAL 2 TIMES DAILY PRN
Qty: 60 TABLET | Refills: 0 | Status: SHIPPED | OUTPATIENT
Start: 2025-07-11 | End: 2025-08-10

## 2025-07-09 RX ORDER — ALPRAZOLAM 1 MG/1
1 TABLET ORAL 2 TIMES DAILY PRN
Qty: 60 TABLET | Refills: 0 | Status: SHIPPED | OUTPATIENT
Start: 2025-09-09 | End: 2025-10-09

## 2025-07-09 RX ORDER — ALPRAZOLAM 1 MG/1
1 TABLET ORAL 2 TIMES DAILY PRN
Qty: 60 TABLET | Refills: 0 | Status: SHIPPED | OUTPATIENT
Start: 2025-08-10 | End: 2025-09-09

## 2025-07-09 ASSESSMENT — PATIENT HEALTH QUESTIONNAIRE - PHQ9
1. LITTLE INTEREST OR PLEASURE IN DOING THINGS: NOT AT ALL
7. TROUBLE CONCENTRATING ON THINGS, SUCH AS READING THE NEWSPAPER OR WATCHING TELEVISION: NOT AT ALL
SUM OF ALL RESPONSES TO PHQ QUESTIONS 1-9: 0
8. MOVING OR SPEAKING SO SLOWLY THAT OTHER PEOPLE COULD HAVE NOTICED. OR THE OPPOSITE, BEING SO FIGETY OR RESTLESS THAT YOU HAVE BEEN MOVING AROUND A LOT MORE THAN USUAL: NOT AT ALL
4. FEELING TIRED OR HAVING LITTLE ENERGY: NOT AT ALL
SUM OF ALL RESPONSES TO PHQ QUESTIONS 1-9: 0
10. IF YOU CHECKED OFF ANY PROBLEMS, HOW DIFFICULT HAVE THESE PROBLEMS MADE IT FOR YOU TO DO YOUR WORK, TAKE CARE OF THINGS AT HOME, OR GET ALONG WITH OTHER PEOPLE: NOT DIFFICULT AT ALL
5. POOR APPETITE OR OVEREATING: NOT AT ALL
SUM OF ALL RESPONSES TO PHQ QUESTIONS 1-9: 0
3. TROUBLE FALLING OR STAYING ASLEEP: NOT AT ALL
SUM OF ALL RESPONSES TO PHQ QUESTIONS 1-9: 0
9. THOUGHTS THAT YOU WOULD BE BETTER OFF DEAD, OR OF HURTING YOURSELF: NOT AT ALL
2. FEELING DOWN, DEPRESSED OR HOPELESS: NOT AT ALL
6. FEELING BAD ABOUT YOURSELF - OR THAT YOU ARE A FAILURE OR HAVE LET YOURSELF OR YOUR FAMILY DOWN: NOT AT ALL

## 2025-07-09 ASSESSMENT — ENCOUNTER SYMPTOMS
RESPIRATORY NEGATIVE: 1
GASTROINTESTINAL NEGATIVE: 1

## 2025-07-09 NOTE — PROGRESS NOTES
Live Torres (:  1980) is a 45 y.o. female,Established patient, here for evaluation of the following chief complaint(s):  Follow-up (Med check )      Subjective   HPI  History of Present Illness  The patient is a 45-year-old female here for a routine follow-up.    She has recently begun seeing a psychiatrist through her [employer] insurance. Her sertraline was discontinued, and she was prescribed Prozac 20 mg instead. She reports no side effects from this medication. Denies SI/HI. She is also taking Xanax as needed, typically in the morning, and is seeking a refill of this prescription. Denies SE to medication and is tolerating well.          2025     7:51 AM 2025     7:55 AM 2025     7:46 AM 2025     2:25 PM 3/25/2025     9:10 AM 2024     7:45 AM 2024     8:02 AM   PHQ Scores   PHQ2 Score 0 0 0 0 0 0 3   PHQ9 Score 0 0 0 0 0 9 12         I reviewed the medical records and past history for Live.    No Known Allergies    Current Outpatient Medications   Medication Sig Dispense Refill    FLUoxetine (PROZAC) 20 MG capsule Take 1 capsule by mouth daily      [START ON 2025] ALPRAZolam (XANAX) 1 MG tablet Take 1 tablet by mouth 2 times daily as needed for Anxiety for up to 30 days. Max Daily Amount: 2 mg 60 tablet 0    [START ON 8/10/2025] ALPRAZolam (XANAX) 1 MG tablet Take 1 tablet by mouth 2 times daily as needed for Anxiety for up to 30 days. Max Daily Amount: 2 mg 60 tablet 0    [START ON 2025] ALPRAZolam (XANAX) 1 MG tablet Take 1 tablet by mouth 2 times daily as needed for Anxiety for up to 30 days. Max Daily Amount: 2 mg 60 tablet 0    butalbital-acetaminophen-caffeine (FIORICET, ESGIC) -40 MG per tablet TAKE 1 TABLET BY MOUTH EVERY 6 HOURS AS NEEDED FOR MIGRAINE. MAX OF 4 PER DAY, no more than 15 tablets per month 15 tablet 3    fluticasone (FLONASE) 50 MCG/ACT nasal spray SPRAY 2 SPRAYS INTO EACH NOSTRIL EVERY DAY 48 mL 1    ALPRAZolam (XANAX) 1 MG tablet

## 2025-07-09 NOTE — PATIENT INSTRUCTIONS
We are committed to providing you the best care possible.    If you receive a survey after visiting one of our offices, please take time to share your experience concerning your physician office visit.  These surveys are confidential and no health information about you is shared.    We are eager to improve for you and continue to give you satisfactory care, we are counting on your feedback to help make that happen.            Welcome to [facility]:    Did you know we now have a faster way for you to move through your appointment? For your convenience, we now have digital registration available. When you schedule your next appointment, you will receive a link via your email as well as a text message that will allow you to complete any paperwork digitally before your appointment.

## 2025-07-25 ENCOUNTER — TELEPHONE (OUTPATIENT)
Age: 45
End: 2025-07-25

## 2025-07-25 NOTE — TELEPHONE ENCOUNTER
[employer] renewal  Outcome  Approved on July 18 by [employer] 2017  CaseId:[id number];Status:Approved;Review Type:Prior Auth;Coverage Start Date:07/15/2025;Coverage End Date:07/18/2026;  Effective Date: 7/15/2025  Authorization Expiration Date: 7/18/2026